# Patient Record
Sex: FEMALE | Race: WHITE
[De-identification: names, ages, dates, MRNs, and addresses within clinical notes are randomized per-mention and may not be internally consistent; named-entity substitution may affect disease eponyms.]

---

## 2022-04-14 ENCOUNTER — HOSPITAL ENCOUNTER (OUTPATIENT)
Dept: HOSPITAL 100 - CT | Age: 62
Discharge: HOME | End: 2022-04-14
Payer: SELF-PAY

## 2022-04-14 DIAGNOSIS — K21.9: Primary | ICD-10-CM

## 2022-04-14 DIAGNOSIS — R10.13: ICD-10-CM

## 2022-04-14 DIAGNOSIS — D64.9: ICD-10-CM

## 2022-04-14 PROCEDURE — 74176 CT ABD & PELVIS W/O CONTRAST: CPT

## 2022-04-19 ENCOUNTER — HOSPITAL ENCOUNTER (OUTPATIENT)
Dept: HOSPITAL 100 - CT | Age: 62
Discharge: HOME | End: 2022-04-19
Payer: SELF-PAY

## 2022-04-19 DIAGNOSIS — R91.1: Primary | ICD-10-CM

## 2022-04-19 DIAGNOSIS — Z98.82: ICD-10-CM

## 2022-04-19 PROCEDURE — 71250 CT THORAX DX C-: CPT

## 2023-03-09 DIAGNOSIS — I47.10 PAROXYSMAL SVT (SUPRAVENTRICULAR TACHYCARDIA) (CMS-HCC): ICD-10-CM

## 2023-03-09 DIAGNOSIS — T78.40XS ALLERGY, SEQUELA: Primary | ICD-10-CM

## 2023-03-09 RX ORDER — DILTIAZEM HYDROCHLORIDE 180 MG/1
1 CAPSULE, COATED, EXTENDED RELEASE ORAL DAILY
COMMUNITY
Start: 2020-02-25 | End: 2023-03-09 | Stop reason: SDUPTHER

## 2023-03-09 RX ORDER — ALBUTEROL SULFATE 0.83 MG/ML
2.5 SOLUTION RESPIRATORY (INHALATION) EVERY 4 HOURS PRN
COMMUNITY
Start: 2020-02-24 | End: 2024-04-01 | Stop reason: SDUPTHER

## 2023-03-09 RX ORDER — METOPROLOL TARTRATE 25 MG/1
25 TABLET, FILM COATED ORAL 2 TIMES DAILY
Qty: 180 TABLET | Refills: 3 | Status: SHIPPED | OUTPATIENT
Start: 2023-03-09 | End: 2023-05-22 | Stop reason: SDUPTHER

## 2023-03-09 RX ORDER — FEXOFENADINE HCL AND PSEUDOEPHEDRINE HCI 60; 120 MG/1; MG/1
1 TABLET, EXTENDED RELEASE ORAL EVERY 12 HOURS
Qty: 180 TABLET | Refills: 3 | Status: SHIPPED | OUTPATIENT
Start: 2023-03-09 | End: 2023-05-22 | Stop reason: SDUPTHER

## 2023-03-09 RX ORDER — FUROSEMIDE 40 MG/1
1 TABLET ORAL DAILY
COMMUNITY
Start: 2020-02-24 | End: 2023-05-11

## 2023-03-09 RX ORDER — METFORMIN HYDROCHLORIDE 750 MG/1
2 TABLET, EXTENDED RELEASE ORAL DAILY
COMMUNITY
Start: 2022-03-22 | End: 2023-03-22

## 2023-03-09 RX ORDER — FEXOFENADINE HCL AND PSEUDOEPHEDRINE HCI 60; 120 MG/1; MG/1
1 TABLET, EXTENDED RELEASE ORAL EVERY 12 HOURS
COMMUNITY
Start: 2020-02-24 | End: 2023-03-09 | Stop reason: SDUPTHER

## 2023-03-09 RX ORDER — METOPROLOL TARTRATE 25 MG/1
1 TABLET, FILM COATED ORAL 2 TIMES DAILY
COMMUNITY
Start: 2020-02-24 | End: 2023-03-09 | Stop reason: SDUPTHER

## 2023-03-09 RX ORDER — GLIMEPIRIDE 4 MG/1
1 TABLET ORAL 2 TIMES DAILY
COMMUNITY
Start: 2020-02-24 | End: 2023-05-11

## 2023-03-09 RX ORDER — POTASSIUM CHLORIDE 750 MG/1
1 TABLET, FILM COATED, EXTENDED RELEASE ORAL DAILY
COMMUNITY
Start: 2020-02-24 | End: 2023-04-26

## 2023-03-09 RX ORDER — OMEPRAZOLE 40 MG/1
1 CAPSULE, DELAYED RELEASE ORAL DAILY
COMMUNITY
Start: 2019-11-11 | End: 2023-05-22 | Stop reason: SDUPTHER

## 2023-03-09 RX ORDER — ALBUTEROL SULFATE 90 UG/1
2 AEROSOL, METERED RESPIRATORY (INHALATION) EVERY 4 HOURS PRN
COMMUNITY
Start: 2020-02-24 | End: 2023-04-26 | Stop reason: SDUPTHER

## 2023-03-09 RX ORDER — DILTIAZEM HYDROCHLORIDE 180 MG/1
180 CAPSULE, COATED, EXTENDED RELEASE ORAL DAILY
Qty: 90 CAPSULE | Refills: 3 | Status: SHIPPED | OUTPATIENT
Start: 2023-03-09 | End: 2023-05-22 | Stop reason: SDUPTHER

## 2023-03-15 DIAGNOSIS — E11.9 TYPE 2 DIABETES MELLITUS WITHOUT COMPLICATION, UNSPECIFIED WHETHER LONG TERM INSULIN USE (MULTI): ICD-10-CM

## 2023-03-22 RX ORDER — METFORMIN HYDROCHLORIDE 750 MG/1
1500 TABLET, EXTENDED RELEASE ORAL DAILY
Qty: 180 TABLET | Refills: 3 | Status: SHIPPED | OUTPATIENT
Start: 2023-03-22 | End: 2023-05-22 | Stop reason: SDUPTHER

## 2023-04-12 ENCOUNTER — TELEPHONE (OUTPATIENT)
Dept: PRIMARY CARE | Facility: CLINIC | Age: 63
End: 2023-04-12

## 2023-04-12 DIAGNOSIS — J43.2 CENTRILOBULAR EMPHYSEMA (MULTI): Primary | ICD-10-CM

## 2023-04-12 PROBLEM — E11.9 DIABETES MELLITUS (MULTI): Status: ACTIVE | Noted: 2023-04-12

## 2023-04-12 PROBLEM — J44.9 COPD (CHRONIC OBSTRUCTIVE PULMONARY DISEASE) (MULTI): Status: ACTIVE | Noted: 2023-04-12

## 2023-04-12 PROBLEM — R10.13 EPIGASTRIC ABDOMINAL PAIN OF UNKNOWN ETIOLOGY: Status: RESOLVED | Noted: 2023-04-12 | Resolved: 2023-04-12

## 2023-04-12 PROBLEM — I47.10 PAROXYSMAL SVT (SUPRAVENTRICULAR TACHYCARDIA) (CMS-HCC): Status: ACTIVE | Noted: 2023-04-12

## 2023-04-12 PROBLEM — K21.9 GERD (GASTROESOPHAGEAL REFLUX DISEASE): Status: ACTIVE | Noted: 2023-04-12

## 2023-04-12 RX ORDER — AZITHROMYCIN 250 MG/1
TABLET, FILM COATED ORAL
Qty: 6 TABLET | Refills: 0 | Status: SHIPPED | OUTPATIENT
Start: 2023-04-12 | End: 2023-04-17

## 2023-04-12 RX ORDER — METOPROLOL TARTRATE 50 MG/1
1 TABLET ORAL 2 TIMES DAILY
COMMUNITY
Start: 2023-03-09 | End: 2023-05-22 | Stop reason: SDUPTHER

## 2023-04-12 NOTE — TELEPHONE ENCOUNTER
Pt  left message stating that she has sinus infection x3wks OTC meds not working. Asking if you could send in ATB. Please advise

## 2023-04-14 DIAGNOSIS — E87.6 HYPOKALEMIA: Primary | ICD-10-CM

## 2023-04-14 DIAGNOSIS — J43.2 CENTRILOBULAR EMPHYSEMA (MULTI): ICD-10-CM

## 2023-04-26 RX ORDER — ALBUTEROL SULFATE 90 UG/1
2 AEROSOL, METERED RESPIRATORY (INHALATION) EVERY 4 HOURS PRN
Qty: 18 G | Refills: 3 | Status: SHIPPED | OUTPATIENT
Start: 2023-04-26 | End: 2023-09-07 | Stop reason: SDUPTHER

## 2023-04-26 RX ORDER — POTASSIUM CHLORIDE 750 MG/1
TABLET, EXTENDED RELEASE ORAL
Qty: 90 TABLET | Refills: 3 | Status: SHIPPED | OUTPATIENT
Start: 2023-04-26 | End: 2023-05-22 | Stop reason: SDUPTHER

## 2023-05-10 DIAGNOSIS — E11.9 TYPE 2 DIABETES MELLITUS WITHOUT COMPLICATION, UNSPECIFIED WHETHER LONG TERM INSULIN USE (MULTI): ICD-10-CM

## 2023-05-10 DIAGNOSIS — I47.10 PAROXYSMAL SVT (SUPRAVENTRICULAR TACHYCARDIA) (CMS-HCC): ICD-10-CM

## 2023-05-11 RX ORDER — GLIMEPIRIDE 4 MG/1
4 TABLET ORAL
Qty: 180 TABLET | Refills: 3 | Status: SHIPPED | OUTPATIENT
Start: 2023-05-11 | End: 2024-04-01 | Stop reason: SDUPTHER

## 2023-05-11 RX ORDER — FUROSEMIDE 40 MG/1
40 TABLET ORAL DAILY
Qty: 90 TABLET | Refills: 3 | Status: SHIPPED | OUTPATIENT
Start: 2023-05-11 | End: 2024-04-01 | Stop reason: SDUPTHER

## 2023-05-18 ENCOUNTER — TELEPHONE (OUTPATIENT)
Dept: PRIMARY CARE | Facility: CLINIC | Age: 63
End: 2023-05-18

## 2023-05-18 ENCOUNTER — LAB (OUTPATIENT)
Dept: LAB | Facility: LAB | Age: 63
End: 2023-05-18

## 2023-05-18 DIAGNOSIS — E11.9 TYPE 2 DIABETES MELLITUS WITHOUT COMPLICATION, WITHOUT LONG-TERM CURRENT USE OF INSULIN (MULTI): Primary | ICD-10-CM

## 2023-05-18 DIAGNOSIS — E11.9 TYPE 2 DIABETES MELLITUS WITHOUT COMPLICATION, WITHOUT LONG-TERM CURRENT USE OF INSULIN (MULTI): ICD-10-CM

## 2023-05-18 DIAGNOSIS — R53.82 CHRONIC FATIGUE: ICD-10-CM

## 2023-05-18 LAB
ALANINE AMINOTRANSFERASE (SGPT) (U/L) IN SER/PLAS: 14 U/L (ref 7–45)
ALBUMIN (G/DL) IN SER/PLAS: 4 G/DL (ref 3.4–5)
ALKALINE PHOSPHATASE (U/L) IN SER/PLAS: 129 U/L (ref 33–136)
ANION GAP IN SER/PLAS: 10 MMOL/L (ref 10–20)
ASPARTATE AMINOTRANSFERASE (SGOT) (U/L) IN SER/PLAS: 14 U/L (ref 9–39)
BILIRUBIN TOTAL (MG/DL) IN SER/PLAS: 0.5 MG/DL (ref 0–1.2)
CALCIUM (MG/DL) IN SER/PLAS: 10.3 MG/DL (ref 8.6–10.3)
CARBON DIOXIDE, TOTAL (MMOL/L) IN SER/PLAS: 29 MMOL/L (ref 21–32)
CHLORIDE (MMOL/L) IN SER/PLAS: 104 MMOL/L (ref 98–107)
CHOLESTEROL (MG/DL) IN SER/PLAS: 214 MG/DL (ref 0–199)
CHOLESTEROL IN HDL (MG/DL) IN SER/PLAS: 32 MG/DL
CHOLESTEROL/HDL RATIO: 6.7
COBALAMIN (VITAMIN B12) (PG/ML) IN SER/PLAS: 180 PG/ML (ref 211–911)
CREATININE (MG/DL) IN SER/PLAS: 0.8 MG/DL (ref 0.5–1.05)
ERYTHROCYTE DISTRIBUTION WIDTH (RATIO) BY AUTOMATED COUNT: 15.9 % (ref 11.5–14.5)
ERYTHROCYTE MEAN CORPUSCULAR HEMOGLOBIN CONCENTRATION (G/DL) BY AUTOMATED: 30 G/DL (ref 32–36)
ERYTHROCYTE MEAN CORPUSCULAR VOLUME (FL) BY AUTOMATED COUNT: 77 FL (ref 80–100)
ERYTHROCYTES (10*6/UL) IN BLOOD BY AUTOMATED COUNT: 5.76 X10E12/L (ref 4–5.2)
ESTIMATED AVERAGE GLUCOSE FOR HBA1C: 140 MG/DL
FERRITIN (UG/LL) IN SER/PLAS: 32 UG/L (ref 8–150)
GFR FEMALE: 82 ML/MIN/1.73M2
GLUCOSE (MG/DL) IN SER/PLAS: 75 MG/DL (ref 74–99)
HEMATOCRIT (%) IN BLOOD BY AUTOMATED COUNT: 44.4 % (ref 36–46)
HEMOGLOBIN (G/DL) IN BLOOD: 13.3 G/DL (ref 12–16)
HEMOGLOBIN A1C/HEMOGLOBIN TOTAL IN BLOOD: 6.5 %
IRON (UG/DL) IN SER/PLAS: 50 UG/DL (ref 35–150)
IRON BINDING CAPACITY (UG/DL) IN SER/PLAS: 483 UG/DL (ref 240–445)
IRON SATURATION (%) IN SER/PLAS: 10 % (ref 25–45)
LDL: 139 MG/DL (ref 0–99)
LEUKOCYTES (10*3/UL) IN BLOOD BY AUTOMATED COUNT: 7.1 X10E9/L (ref 4.4–11.3)
NON HDL CHOLESTEROL: 182 MG/DL
PLATELETS (10*3/UL) IN BLOOD AUTOMATED COUNT: 229 X10E9/L (ref 150–450)
POTASSIUM (MMOL/L) IN SER/PLAS: 4.4 MMOL/L (ref 3.5–5.3)
PROTEIN TOTAL: 7.1 G/DL (ref 6.4–8.2)
SODIUM (MMOL/L) IN SER/PLAS: 139 MMOL/L (ref 136–145)
THYROTROPIN (MIU/L) IN SER/PLAS BY DETECTION LIMIT <= 0.05 MIU/L: 4.05 MIU/L (ref 0.44–3.98)
TRIGLYCERIDE (MG/DL) IN SER/PLAS: 213 MG/DL (ref 0–149)
UREA NITROGEN (MG/DL) IN SER/PLAS: 15 MG/DL (ref 6–23)
VLDL: 43 MG/DL (ref 0–40)

## 2023-05-18 PROCEDURE — 80061 LIPID PANEL: CPT

## 2023-05-18 PROCEDURE — 80053 COMPREHEN METABOLIC PANEL: CPT

## 2023-05-18 PROCEDURE — 83036 HEMOGLOBIN GLYCOSYLATED A1C: CPT

## 2023-05-18 PROCEDURE — 85027 COMPLETE CBC AUTOMATED: CPT

## 2023-05-18 PROCEDURE — 36415 COLL VENOUS BLD VENIPUNCTURE: CPT

## 2023-05-18 PROCEDURE — 83550 IRON BINDING TEST: CPT

## 2023-05-18 PROCEDURE — 84443 ASSAY THYROID STIM HORMONE: CPT

## 2023-05-18 PROCEDURE — 83540 ASSAY OF IRON: CPT

## 2023-05-18 PROCEDURE — 82607 VITAMIN B-12: CPT

## 2023-05-18 PROCEDURE — 82728 ASSAY OF FERRITIN: CPT

## 2023-05-22 ENCOUNTER — TELEPHONE (OUTPATIENT)
Dept: PRIMARY CARE | Facility: CLINIC | Age: 63
End: 2023-05-22

## 2023-05-22 ENCOUNTER — OFFICE VISIT (OUTPATIENT)
Dept: PRIMARY CARE | Facility: CLINIC | Age: 63
End: 2023-05-22

## 2023-05-22 VITALS
HEIGHT: 67 IN | BODY MASS INDEX: 30.67 KG/M2 | DIASTOLIC BLOOD PRESSURE: 62 MMHG | HEART RATE: 76 BPM | SYSTOLIC BLOOD PRESSURE: 124 MMHG | OXYGEN SATURATION: 95 % | WEIGHT: 195.4 LBS

## 2023-05-22 DIAGNOSIS — E11.9 TYPE 2 DIABETES MELLITUS WITHOUT COMPLICATION, UNSPECIFIED WHETHER LONG TERM INSULIN USE (MULTI): ICD-10-CM

## 2023-05-22 DIAGNOSIS — T78.40XS ALLERGY, SEQUELA: ICD-10-CM

## 2023-05-22 DIAGNOSIS — R16.0 HEPATOMEGALY: ICD-10-CM

## 2023-05-22 DIAGNOSIS — E87.6 HYPOKALEMIA: ICD-10-CM

## 2023-05-22 DIAGNOSIS — J43.2 CENTRILOBULAR EMPHYSEMA (MULTI): ICD-10-CM

## 2023-05-22 DIAGNOSIS — K21.9 GASTROESOPHAGEAL REFLUX DISEASE, UNSPECIFIED WHETHER ESOPHAGITIS PRESENT: ICD-10-CM

## 2023-05-22 DIAGNOSIS — E53.8 B12 DEFICIENCY: ICD-10-CM

## 2023-05-22 DIAGNOSIS — D64.9 ANEMIA, UNSPECIFIED TYPE: Primary | ICD-10-CM

## 2023-05-22 DIAGNOSIS — I47.10 PAROXYSMAL SVT (SUPRAVENTRICULAR TACHYCARDIA) (CMS-HCC): ICD-10-CM

## 2023-05-22 PROCEDURE — 96372 THER/PROPH/DIAG INJ SC/IM: CPT | Performed by: FAMILY MEDICINE

## 2023-05-22 PROCEDURE — 99213 OFFICE O/P EST LOW 20 MIN: CPT | Performed by: FAMILY MEDICINE

## 2023-05-22 PROCEDURE — 3074F SYST BP LT 130 MM HG: CPT | Performed by: FAMILY MEDICINE

## 2023-05-22 PROCEDURE — 3044F HG A1C LEVEL LT 7.0%: CPT | Performed by: FAMILY MEDICINE

## 2023-05-22 PROCEDURE — 3078F DIAST BP <80 MM HG: CPT | Performed by: FAMILY MEDICINE

## 2023-05-22 RX ORDER — METOPROLOL TARTRATE 25 MG/1
25 TABLET, FILM COATED ORAL 2 TIMES DAILY
Qty: 180 TABLET | Refills: 3 | Status: SHIPPED | OUTPATIENT
Start: 2023-05-22 | End: 2024-04-01 | Stop reason: SDUPTHER

## 2023-05-22 RX ORDER — TRIAMCINOLONE ACETONIDE 40 MG/ML
40 INJECTION, SUSPENSION INTRA-ARTICULAR; INTRAMUSCULAR ONCE
Status: COMPLETED | OUTPATIENT
Start: 2023-05-22 | End: 2023-05-22

## 2023-05-22 RX ORDER — POTASSIUM CHLORIDE 750 MG/1
10 TABLET, FILM COATED, EXTENDED RELEASE ORAL DAILY
Qty: 90 TABLET | Refills: 3 | Status: SHIPPED | OUTPATIENT
Start: 2023-05-22 | End: 2024-04-01 | Stop reason: SDUPTHER

## 2023-05-22 RX ORDER — CYANOCOBALAMIN 1000 UG/ML
1000 INJECTION, SOLUTION INTRAMUSCULAR; SUBCUTANEOUS ONCE
Status: COMPLETED | OUTPATIENT
Start: 2023-05-22 | End: 2023-05-22

## 2023-05-22 RX ORDER — METOPROLOL TARTRATE 50 MG/1
50 TABLET ORAL 2 TIMES DAILY
Qty: 180 TABLET | Refills: 3 | Status: SHIPPED | OUTPATIENT
Start: 2023-05-22 | End: 2023-12-15 | Stop reason: SDUPTHER

## 2023-05-22 RX ORDER — METFORMIN HYDROCHLORIDE 750 MG/1
1500 TABLET, EXTENDED RELEASE ORAL DAILY
Qty: 180 TABLET | Refills: 3 | Status: SHIPPED | OUTPATIENT
Start: 2023-05-22 | End: 2024-03-07 | Stop reason: SDUPTHER

## 2023-05-22 RX ORDER — FEXOFENADINE HCL AND PSEUDOEPHEDRINE HCI 60; 120 MG/1; MG/1
1 TABLET, EXTENDED RELEASE ORAL EVERY 12 HOURS
Qty: 180 TABLET | Refills: 3 | Status: SHIPPED | OUTPATIENT
Start: 2023-05-22 | End: 2024-06-04

## 2023-05-22 RX ORDER — DILTIAZEM HYDROCHLORIDE 180 MG/1
180 CAPSULE, COATED, EXTENDED RELEASE ORAL DAILY
Qty: 90 CAPSULE | Refills: 3 | Status: SHIPPED | OUTPATIENT
Start: 2023-05-22 | End: 2024-04-01 | Stop reason: SDUPTHER

## 2023-05-22 RX ORDER — OMEPRAZOLE 40 MG/1
40 CAPSULE, DELAYED RELEASE ORAL DAILY
Qty: 90 CAPSULE | Refills: 3 | Status: SHIPPED | OUTPATIENT
Start: 2023-05-22 | End: 2024-04-01 | Stop reason: SDUPTHER

## 2023-05-22 RX ADMIN — TRIAMCINOLONE ACETONIDE 40 MG: 40 INJECTION, SUSPENSION INTRA-ARTICULAR; INTRAMUSCULAR at 10:55

## 2023-05-22 RX ADMIN — CYANOCOBALAMIN 1000 MCG: 1000 INJECTION, SOLUTION INTRAMUSCULAR; SUBCUTANEOUS at 11:21

## 2023-05-22 ASSESSMENT — ENCOUNTER SYMPTOMS
DIFFICULTY URINATING: 0
WHEEZING: 0
DIARRHEA: 0
VOMITING: 0
ARTHRALGIAS: 0
PALPITATIONS: 0
HEADACHES: 0
COUGH: 1
APPETITE CHANGE: 0
ABDOMINAL DISTENTION: 0
SHORTNESS OF BREATH: 1
LIGHT-HEADEDNESS: 0
ADENOPATHY: 0
CONSTIPATION: 0
ACTIVITY CHANGE: 0
NUMBNESS: 0
FATIGUE: 1
RHINORRHEA: 0
DIZZINESS: 0
UNEXPECTED WEIGHT CHANGE: 1
TROUBLE SWALLOWING: 0
ABDOMINAL PAIN: 1
NAUSEA: 1

## 2023-05-22 NOTE — ASSESSMENT & PLAN NOTE
Patient advised at length today about smoking cessation, give a Kenalog injection for COPD flare, continue with nebulizer treatments at home.

## 2023-05-22 NOTE — ASSESSMENT & PLAN NOTE
Hemoglobin now improved, patient unable to tolerate oral iron.  Strongly recommend EGD and colonoscopy, patient again defers due to his lack of insurance.

## 2023-05-22 NOTE — ASSESSMENT & PLAN NOTE
Patient with some increasing abdominal pain, patient is lost over 10 pounds in the past year, liver enzyme testing is normal however patient seems to have some enlargement of her liver, CAT scan last year question some ascites.  We will check ultrasound to evaluate further, recommend EGD and colonoscopy, patient defers due to cost of procedures with lack of insurance.

## 2023-05-22 NOTE — TELEPHONE ENCOUNTER
US OF LIVER ORDER WITH DEMOGRAPHICS FAXED TO [facility] AT FAX: [phone] AND PH: [phone] WITH REQUEST TO SCHEDULE.  PATIENT IS A SEFLPAY.

## 2023-05-22 NOTE — PROGRESS NOTES
"Subjective   Patient ID: Ana Melvin is a 63 y.o. female who presents for allergies, REV LABS.    HPI   No headache, chest pain, shortness of breath (+CORONA) dizziness (at times) lightheadedness, + edema  No low blood sugars since last OV, seen opthalmology in the past year, and no numbness or tingling in feet, skin normal.  Taking PPI daily without breakthrough symptoms.  Reviewed dietary, caffeine, tobacco, alcohol, and NSAID use.  No dyspepsia, dysphagia, reflux, melena, or abdominal pain.  + SOB, using albuterol frequently, still smoking despite , some sputum, some wheezing at times  Occ palpitations, some pain and nausea in stomach when eats, burping often  Had trouble tolerating oral iron    Review of Systems   Constitutional:  Positive for fatigue and unexpected weight change. Negative for activity change and appetite change.   HENT:  Negative for ear pain, nosebleeds, rhinorrhea, sneezing and trouble swallowing.    Respiratory:  Positive for cough and shortness of breath. Negative for wheezing.    Cardiovascular:  Negative for chest pain, palpitations and leg swelling.   Gastrointestinal:  Positive for abdominal pain and nausea. Negative for abdominal distention, constipation, diarrhea and vomiting.   Genitourinary:  Negative for difficulty urinating.   Musculoskeletal:  Negative for arthralgias.   Skin:  Negative for rash.   Neurological:  Negative for dizziness, light-headedness, numbness and headaches.   Hematological:  Negative for adenopathy.   Psychiatric/Behavioral:  Negative for behavioral problems.    All other systems reviewed and are negative.      Objective   /62   Pulse 76   Ht 1.689 m (5' 6.5\")   Wt 88.6 kg (195 lb 6.4 oz)   SpO2 95%   BMI 31.07 kg/m²     Physical Exam  Vitals and nursing note reviewed.   Constitutional:       General: She is not in acute distress.     Appearance: Normal appearance. She is not toxic-appearing.   HENT:      Head: Normocephalic and atraumatic.    "   Right Ear: Tympanic membrane, ear canal and external ear normal.      Left Ear: Tympanic membrane, ear canal and external ear normal.      Nose: Nose normal.      Mouth/Throat:      Mouth: Mucous membranes are moist.      Pharynx: Oropharynx is clear.   Eyes:      Extraocular Movements: Extraocular movements intact.      Conjunctiva/sclera: Conjunctivae normal.      Pupils: Pupils are equal, round, and reactive to light.   Cardiovascular:      Rate and Rhythm: Normal rate and regular rhythm.   Pulmonary:      Effort: Pulmonary effort is normal.      Breath sounds: Normal breath sounds.   Abdominal:      General: Abdomen is flat. Bowel sounds are normal. There is no distension.      Palpations: Abdomen is soft. There is mass.      Tenderness: There is abdominal tenderness.      Comments: Liver noted to be at least 2 fingerbreadths below the costal edge, patient also has evidence of multiple cherry angiomas over most of her abdomen.   Musculoskeletal:      Cervical back: Normal range of motion and neck supple.   Skin:     General: Skin is warm and dry.      Capillary Refill: Capillary refill takes less than 2 seconds.   Neurological:      General: No focal deficit present.      Mental Status: She is alert and oriented to person, place, and time. Mental status is at baseline.   Psychiatric:         Mood and Affect: Mood normal.         Behavior: Behavior normal.         Assessment/Plan   Problem List Items Addressed This Visit          Respiratory    COPD (chronic obstructive pulmonary disease) (CMS/[facility])     Patient advised at length today about smoking cessation, give a Kenalog injection for COPD flare, continue with nebulizer treatments at home.         Relevant Medications    triamcinolone acetonide (Kenalog-40) injection 40 mg (Completed)    Other Relevant Orders    Follow Up In Primary Care       Circulatory    Paroxysmal SVT (supraventricular tachycardia) (CMS/[facility])     Patient not aware of palpitations, blood  pressure under good control renal function stable.         Relevant Medications    dilTIAZem CD (Cartia XT) 180 mg 24 hr capsule    metoprolol tartrate (Lopressor) 25 mg tablet    metoprolol tartrate (Lopressor) 50 mg tablet    Other Relevant Orders    Follow Up In Primary Care    Comprehensive Metabolic Panel       Digestive    GERD (gastroesophageal reflux disease)     Patient with some increasing abdominal pain, patient is lost over 10 pounds in the past year, liver enzyme testing is normal however patient seems to have some enlargement of her liver, CAT scan last year question some ascites.  We will check ultrasound to evaluate further, recommend EGD and colonoscopy, patient defers due to cost of procedures with lack of insurance.         Relevant Medications    omeprazole (PriLOSEC) 40 mg DR capsule    Other Relevant Orders    Follow Up In Primary Care       Endocrine/Metabolic    Diabetes mellitus (CMS/HCC)     A1c testing now below 7, continue with current medication.         Relevant Medications    metFORMIN XR (Glucophage-XR) 750 mg 24 hr tablet    Other Relevant Orders    Follow Up In Primary Care    Comprehensive Metabolic Panel    Hemoglobin A1C    B12 deficiency     We will start B12 replacement.         Relevant Medications    cyanocobalamin (Vitamin B-12) injection 1,000 mcg (Completed)       Hematologic    Anemia - Primary     Hemoglobin now improved, patient unable to tolerate oral iron.  Strongly recommend EGD and colonoscopy, patient again defers due to his lack of insurance.         Relevant Orders    Follow Up In Primary Care    CBC     Other Visit Diagnoses       Allergy, sequela        Relevant Medications    fexofenadine-pseudoephedrine (Allegra-D)  mg 12 hr tablet    Other Relevant Orders    Follow Up In Primary Care    Hypokalemia        Relevant Medications    potassium chloride CR 10 mEq ER tablet    Other Relevant Orders    Follow Up In Primary Care    Hepatomegaly        Relevant  Orders    US abdomen limited liver

## 2023-05-22 NOTE — PATIENT INSTRUCTIONS
You need to consider to have an EGD to evaluate your stomach.  Try to quit smoking and try Selsum Blue shampoo for the scalp

## 2023-05-25 PROBLEM — U07.1 COVID-19: Status: RESOLVED | Noted: 2023-05-25 | Resolved: 2023-05-25

## 2023-05-25 PROBLEM — E66.9 EXTREME OBESITY: Status: ACTIVE | Noted: 2023-05-25

## 2023-05-25 PROBLEM — R91.1 LUNG NODULE: Status: ACTIVE | Noted: 2023-05-25

## 2023-05-25 PROBLEM — R09.1 PLEURISY: Status: RESOLVED | Noted: 2023-05-25 | Resolved: 2023-05-25

## 2023-05-25 PROBLEM — M54.50 LOW BACK PAIN: Status: ACTIVE | Noted: 2023-05-25

## 2023-05-25 PROBLEM — T78.40XA ALLERGIES: Status: ACTIVE | Noted: 2023-05-25

## 2023-05-25 PROBLEM — E66.8 EXTREME OBESITY: Status: ACTIVE | Noted: 2023-05-25

## 2023-05-25 PROBLEM — R35.1 NOCTURIA: Status: ACTIVE | Noted: 2023-05-25

## 2023-05-25 PROBLEM — B37.31 VAGINITIS DUE TO CANDIDA ALBICANS: Status: RESOLVED | Noted: 2023-05-25 | Resolved: 2023-05-25

## 2023-05-25 PROBLEM — R05.9 COUGH: Status: RESOLVED | Noted: 2023-05-25 | Resolved: 2023-05-25

## 2023-05-25 PROBLEM — R31.9 HEMATURIA: Status: ACTIVE | Noted: 2023-05-25

## 2023-05-25 PROBLEM — R35.0 URINARY FREQUENCY: Status: ACTIVE | Noted: 2023-05-25

## 2023-05-30 ENCOUNTER — CLINICAL SUPPORT (OUTPATIENT)
Dept: PRIMARY CARE | Facility: CLINIC | Age: 63
End: 2023-05-30

## 2023-05-30 ENCOUNTER — HOSPITAL ENCOUNTER (OUTPATIENT)
Dept: HOSPITAL 100 - US | Age: 63
Discharge: HOME | End: 2023-05-30
Payer: SELF-PAY

## 2023-05-30 DIAGNOSIS — E53.8 B12 DEFICIENCY: ICD-10-CM

## 2023-05-30 DIAGNOSIS — R16.0: ICD-10-CM

## 2023-05-30 DIAGNOSIS — R10.11: Primary | ICD-10-CM

## 2023-05-30 PROCEDURE — 76705 ECHO EXAM OF ABDOMEN: CPT

## 2023-05-30 PROCEDURE — 96372 THER/PROPH/DIAG INJ SC/IM: CPT | Performed by: FAMILY MEDICINE

## 2023-05-30 RX ORDER — CYANOCOBALAMIN 1000 UG/ML
1000 INJECTION, SOLUTION INTRAMUSCULAR; SUBCUTANEOUS ONCE
Status: COMPLETED | OUTPATIENT
Start: 2023-05-30 | End: 2023-05-30

## 2023-05-30 RX ADMIN — CYANOCOBALAMIN 1000 MCG: 1000 INJECTION, SOLUTION INTRAMUSCULAR; SUBCUTANEOUS at 16:09

## 2023-06-01 ENCOUNTER — DOCUMENTATION (OUTPATIENT)
Dept: PRIMARY CARE | Facility: CLINIC | Age: 63
End: 2023-06-01

## 2023-06-01 NOTE — PROGRESS NOTES
Patient contacted about results of ultrasound done at [facility].  Ultrasound demonstrated evidence of liver cirrhosis mild ascites and right pleural effusion.  Patient was recommended to see hepatology, patient would like to research this further before she proceeds, will contact the office and let us know how she would like to proceed.

## 2023-06-06 ENCOUNTER — CLINICAL SUPPORT (OUTPATIENT)
Dept: PRIMARY CARE | Facility: CLINIC | Age: 63
End: 2023-06-06

## 2023-06-06 DIAGNOSIS — E53.8 B12 DEFICIENCY: ICD-10-CM

## 2023-06-06 PROCEDURE — 96372 THER/PROPH/DIAG INJ SC/IM: CPT | Performed by: FAMILY MEDICINE

## 2023-06-06 RX ORDER — CYANOCOBALAMIN 1000 UG/ML
1000 INJECTION, SOLUTION INTRAMUSCULAR; SUBCUTANEOUS ONCE
Status: COMPLETED | OUTPATIENT
Start: 2023-06-06 | End: 2023-06-06

## 2023-06-06 RX ADMIN — CYANOCOBALAMIN 1000 MCG: 1000 INJECTION, SOLUTION INTRAMUSCULAR; SUBCUTANEOUS at 14:32

## 2023-06-13 ENCOUNTER — CLINICAL SUPPORT (OUTPATIENT)
Dept: PRIMARY CARE | Facility: CLINIC | Age: 63
End: 2023-06-13

## 2023-06-13 DIAGNOSIS — E53.8 B12 DEFICIENCY: ICD-10-CM

## 2023-06-13 PROCEDURE — 96372 THER/PROPH/DIAG INJ SC/IM: CPT | Performed by: FAMILY MEDICINE

## 2023-06-13 RX ORDER — CYANOCOBALAMIN 1000 UG/ML
1000 INJECTION, SOLUTION INTRAMUSCULAR; SUBCUTANEOUS ONCE
Status: COMPLETED | OUTPATIENT
Start: 2023-06-13 | End: 2023-06-13

## 2023-06-13 RX ADMIN — CYANOCOBALAMIN 1000 MCG: 1000 INJECTION, SOLUTION INTRAMUSCULAR; SUBCUTANEOUS at 14:37

## 2023-07-11 ENCOUNTER — CLINICAL SUPPORT (OUTPATIENT)
Dept: PRIMARY CARE | Facility: CLINIC | Age: 63
End: 2023-07-11
Payer: COMMERCIAL

## 2023-07-11 DIAGNOSIS — E53.8 B12 DEFICIENCY: ICD-10-CM

## 2023-07-11 PROCEDURE — 96372 THER/PROPH/DIAG INJ SC/IM: CPT | Performed by: FAMILY MEDICINE

## 2023-07-11 RX ORDER — CYANOCOBALAMIN 1000 UG/ML
1000 INJECTION, SOLUTION INTRAMUSCULAR; SUBCUTANEOUS ONCE
Status: COMPLETED | OUTPATIENT
Start: 2023-07-11 | End: 2023-07-11

## 2023-07-11 RX ADMIN — CYANOCOBALAMIN 1000 MCG: 1000 INJECTION, SOLUTION INTRAMUSCULAR; SUBCUTANEOUS at 14:30

## 2023-08-11 ENCOUNTER — CLINICAL SUPPORT (OUTPATIENT)
Dept: PRIMARY CARE | Facility: CLINIC | Age: 63
End: 2023-08-11

## 2023-08-11 DIAGNOSIS — E53.8 B12 DEFICIENCY: ICD-10-CM

## 2023-08-11 PROCEDURE — 96372 THER/PROPH/DIAG INJ SC/IM: CPT | Performed by: FAMILY MEDICINE

## 2023-08-11 RX ORDER — CYANOCOBALAMIN 1000 UG/ML
1000 INJECTION, SOLUTION INTRAMUSCULAR; SUBCUTANEOUS ONCE
Status: COMPLETED | OUTPATIENT
Start: 2023-08-11 | End: 2023-08-11

## 2023-08-11 RX ADMIN — CYANOCOBALAMIN 1000 MCG: 1000 INJECTION, SOLUTION INTRAMUSCULAR; SUBCUTANEOUS at 14:18

## 2023-08-23 ENCOUNTER — CLINICAL SUPPORT (OUTPATIENT)
Dept: PRIMARY CARE | Facility: CLINIC | Age: 63
End: 2023-08-23

## 2023-08-23 DIAGNOSIS — T78.40XA ALLERGY, INITIAL ENCOUNTER: ICD-10-CM

## 2023-08-23 PROCEDURE — 96372 THER/PROPH/DIAG INJ SC/IM: CPT | Performed by: FAMILY MEDICINE

## 2023-08-23 RX ORDER — TRIAMCINOLONE ACETONIDE 40 MG/ML
40 INJECTION, SUSPENSION INTRA-ARTICULAR; INTRAMUSCULAR ONCE
Status: COMPLETED | OUTPATIENT
Start: 2023-08-23 | End: 2023-08-23

## 2023-08-23 RX ADMIN — TRIAMCINOLONE ACETONIDE 40 MG: 40 INJECTION, SUSPENSION INTRA-ARTICULAR; INTRAMUSCULAR at 09:36

## 2023-08-23 NOTE — PROGRESS NOTES
PT CAME IN TODAY FOR INJECTION. 1 ML OF KENALOG WAS INJECTED INTRAMUSCULARLY IN THE  RT  DELTOID , NO ISSUES OR CONCERNS REPORTED.     LOT: [id number]  EXP: 03/2025

## 2023-09-07 DIAGNOSIS — J43.2 CENTRILOBULAR EMPHYSEMA (MULTI): ICD-10-CM

## 2023-09-07 RX ORDER — ALBUTEROL SULFATE 90 UG/1
2 AEROSOL, METERED RESPIRATORY (INHALATION) EVERY 4 HOURS PRN
Qty: 36 G | Refills: 3 | Status: SHIPPED | OUTPATIENT
Start: 2023-09-07 | End: 2024-04-01 | Stop reason: SDUPTHER

## 2023-09-15 ENCOUNTER — CLINICAL SUPPORT (OUTPATIENT)
Dept: PRIMARY CARE | Facility: CLINIC | Age: 63
End: 2023-09-15

## 2023-09-15 DIAGNOSIS — E53.8 B12 DEFICIENCY: ICD-10-CM

## 2023-09-15 PROCEDURE — 96372 THER/PROPH/DIAG INJ SC/IM: CPT | Performed by: FAMILY MEDICINE

## 2023-09-15 RX ORDER — CYANOCOBALAMIN 1000 UG/ML
1000 INJECTION, SOLUTION INTRAMUSCULAR; SUBCUTANEOUS ONCE
Status: COMPLETED | OUTPATIENT
Start: 2023-09-15 | End: 2023-09-15

## 2023-09-15 RX ADMIN — CYANOCOBALAMIN 1000 MCG: 1000 INJECTION, SOLUTION INTRAMUSCULAR; SUBCUTANEOUS at 14:07

## 2023-10-16 ENCOUNTER — CLINICAL SUPPORT (OUTPATIENT)
Dept: PRIMARY CARE | Facility: CLINIC | Age: 63
End: 2023-10-16

## 2023-10-16 DIAGNOSIS — E53.8 B12 DEFICIENCY: ICD-10-CM

## 2023-10-16 PROCEDURE — 96372 THER/PROPH/DIAG INJ SC/IM: CPT | Performed by: FAMILY MEDICINE

## 2023-10-16 RX ORDER — CYANOCOBALAMIN 1000 UG/ML
1000 INJECTION, SOLUTION INTRAMUSCULAR; SUBCUTANEOUS ONCE
Status: COMPLETED | OUTPATIENT
Start: 2023-10-16 | End: 2023-10-16

## 2023-10-16 RX ADMIN — CYANOCOBALAMIN 1000 MCG: 1000 INJECTION, SOLUTION INTRAMUSCULAR; SUBCUTANEOUS at 15:34

## 2023-11-10 ENCOUNTER — CLINICAL SUPPORT (OUTPATIENT)
Dept: PRIMARY CARE | Facility: CLINIC | Age: 63
End: 2023-11-10

## 2023-11-10 DIAGNOSIS — T78.40XD ALLERGY, SUBSEQUENT ENCOUNTER: ICD-10-CM

## 2023-11-10 PROCEDURE — 96372 THER/PROPH/DIAG INJ SC/IM: CPT | Performed by: FAMILY MEDICINE

## 2023-11-10 RX ORDER — TRIAMCINOLONE ACETONIDE 40 MG/ML
40 INJECTION, SUSPENSION INTRA-ARTICULAR; INTRAMUSCULAR ONCE
Status: COMPLETED | OUTPATIENT
Start: 2023-11-10 | End: 2023-11-10

## 2023-11-10 RX ADMIN — TRIAMCINOLONE ACETONIDE 40 MG: 40 INJECTION, SUSPENSION INTRA-ARTICULAR; INTRAMUSCULAR at 14:15

## 2023-11-10 NOTE — PROGRESS NOTES
PT CAME IN TODAY FOR INJECTION. 1 ML OF KENALOG WAS INJECTED INTRAMUSCULARLY IN THE  RT  DELTOID , NO ISSUES OR CONCERNS REPORTED.      LOT: [id number]  EXP: 03/2025

## 2023-11-27 ENCOUNTER — APPOINTMENT (OUTPATIENT)
Dept: PRIMARY CARE | Facility: CLINIC | Age: 63
End: 2023-11-27

## 2023-12-01 ENCOUNTER — TELEPHONE (OUTPATIENT)
Dept: PRIMARY CARE | Facility: CLINIC | Age: 63
End: 2023-12-01

## 2023-12-01 DIAGNOSIS — R05.1 ACUTE COUGH: Primary | ICD-10-CM

## 2023-12-01 RX ORDER — AZITHROMYCIN 250 MG/1
TABLET, FILM COATED ORAL
Qty: 6 TABLET | Refills: 0 | Status: SHIPPED | OUTPATIENT
Start: 2023-12-01 | End: 2023-12-05

## 2023-12-01 NOTE — TELEPHONE ENCOUNTER
Pt asking for something for a sinus infection. Pt states she has been taking OTC meds and its not helping. RT AIDE IS PHARMACY. Please advise

## 2023-12-15 DIAGNOSIS — I47.10 PAROXYSMAL SVT (SUPRAVENTRICULAR TACHYCARDIA) (CMS-HCC): ICD-10-CM

## 2023-12-15 RX ORDER — METOPROLOL TARTRATE 50 MG/1
50 TABLET ORAL 2 TIMES DAILY
Qty: 180 TABLET | Refills: 3 | Status: SHIPPED | OUTPATIENT
Start: 2023-12-15 | End: 2024-12-14

## 2024-03-07 ENCOUNTER — TELEPHONE (OUTPATIENT)
Dept: PRIMARY CARE | Facility: CLINIC | Age: 64
End: 2024-03-07
Payer: MEDICARE

## 2024-03-07 DIAGNOSIS — E11.9 TYPE 2 DIABETES MELLITUS WITHOUT COMPLICATION, UNSPECIFIED WHETHER LONG TERM INSULIN USE (MULTI): ICD-10-CM

## 2024-03-07 DIAGNOSIS — D64.9 ANEMIA, UNSPECIFIED TYPE: ICD-10-CM

## 2024-03-07 DIAGNOSIS — E11.9 TYPE 2 DIABETES MELLITUS WITHOUT COMPLICATION, WITHOUT LONG-TERM CURRENT USE OF INSULIN (MULTI): Primary | ICD-10-CM

## 2024-03-07 DIAGNOSIS — E53.8 B12 DEFICIENCY: ICD-10-CM

## 2024-03-07 RX ORDER — METFORMIN HYDROCHLORIDE 750 MG/1
1500 TABLET, EXTENDED RELEASE ORAL DAILY
Qty: 180 TABLET | Refills: 3 | Status: SHIPPED | OUTPATIENT
Start: 2024-03-07 | End: 2024-04-01 | Stop reason: SDUPTHER

## 2024-03-08 ENCOUNTER — CLINICAL SUPPORT (OUTPATIENT)
Dept: PRIMARY CARE | Facility: CLINIC | Age: 64
End: 2024-03-08
Payer: MEDICARE

## 2024-03-08 ENCOUNTER — LAB (OUTPATIENT)
Dept: LAB | Facility: LAB | Age: 64
End: 2024-03-08
Payer: MEDICARE

## 2024-03-08 DIAGNOSIS — D64.9 ANEMIA, UNSPECIFIED TYPE: ICD-10-CM

## 2024-03-08 DIAGNOSIS — E11.9 TYPE 2 DIABETES MELLITUS WITHOUT COMPLICATION, WITHOUT LONG-TERM CURRENT USE OF INSULIN (MULTI): ICD-10-CM

## 2024-03-08 DIAGNOSIS — I47.10 PAROXYSMAL SVT (SUPRAVENTRICULAR TACHYCARDIA) (CMS-HCC): ICD-10-CM

## 2024-03-08 DIAGNOSIS — E11.9 TYPE 2 DIABETES MELLITUS WITHOUT COMPLICATION, UNSPECIFIED WHETHER LONG TERM INSULIN USE (MULTI): ICD-10-CM

## 2024-03-08 DIAGNOSIS — T78.40XD ALLERGY, SUBSEQUENT ENCOUNTER: ICD-10-CM

## 2024-03-08 DIAGNOSIS — E53.8 B12 DEFICIENCY: ICD-10-CM

## 2024-03-08 LAB
ALBUMIN SERPL BCP-MCNC: 3.9 G/DL (ref 3.4–5)
ALP SERPL-CCNC: 111 U/L (ref 33–136)
ALT SERPL W P-5'-P-CCNC: 19 U/L (ref 7–45)
ANION GAP SERPL CALC-SCNC: 14 MMOL/L (ref 10–20)
AST SERPL W P-5'-P-CCNC: 24 U/L (ref 9–39)
BASOPHILS # BLD AUTO: 0.06 X10*3/UL (ref 0–0.1)
BASOPHILS NFR BLD AUTO: 0.7 %
BILIRUB SERPL-MCNC: 0.5 MG/DL (ref 0–1.2)
BUN SERPL-MCNC: 13 MG/DL (ref 6–23)
CALCIUM SERPL-MCNC: 10 MG/DL (ref 8.6–10.3)
CHLORIDE SERPL-SCNC: 100 MMOL/L (ref 98–107)
CHOLEST SERPL-MCNC: 177 MG/DL (ref 0–199)
CHOLESTEROL/HDL RATIO: 6.3
CO2 SERPL-SCNC: 25 MMOL/L (ref 21–32)
CREAT SERPL-MCNC: 0.9 MG/DL (ref 0.5–1.05)
EGFRCR SERPLBLD CKD-EPI 2021: 71 ML/MIN/1.73M*2
EOSINOPHIL # BLD AUTO: 0.35 X10*3/UL (ref 0–0.7)
EOSINOPHIL NFR BLD AUTO: 3.9 %
ERYTHROCYTE [DISTWIDTH] IN BLOOD BY AUTOMATED COUNT: 16.7 % (ref 11.5–14.5)
EST. AVERAGE GLUCOSE BLD GHB EST-MCNC: 194 MG/DL
FERRITIN SERPL-MCNC: 18 NG/ML (ref 8–150)
GLUCOSE SERPL-MCNC: 188 MG/DL (ref 74–99)
HBA1C MFR BLD: 8.4 %
HCT VFR BLD AUTO: 35.5 % (ref 36–46)
HDLC SERPL-MCNC: 28 MG/DL
HGB BLD-MCNC: 9.9 G/DL (ref 12–16)
IMM GRANULOCYTES # BLD AUTO: 0.05 X10*3/UL (ref 0–0.7)
IMM GRANULOCYTES NFR BLD AUTO: 0.6 % (ref 0–0.9)
LDLC SERPL CALC-MCNC: 106 MG/DL
LYMPHOCYTES # BLD AUTO: 2.1 X10*3/UL (ref 1.2–4.8)
LYMPHOCYTES NFR BLD AUTO: 23.4 %
MCH RBC QN AUTO: 19.6 PG (ref 26–34)
MCHC RBC AUTO-ENTMCNC: 27.9 G/DL (ref 32–36)
MCV RBC AUTO: 70 FL (ref 80–100)
MONOCYTES # BLD AUTO: 0.65 X10*3/UL (ref 0.1–1)
MONOCYTES NFR BLD AUTO: 7.3 %
NEUTROPHILS # BLD AUTO: 5.75 X10*3/UL (ref 1.2–7.7)
NEUTROPHILS NFR BLD AUTO: 64.1 %
NON HDL CHOLESTEROL: 149 MG/DL (ref 0–149)
NRBC BLD-RTO: 0 /100 WBCS (ref 0–0)
PLATELET # BLD AUTO: 347 X10*3/UL (ref 150–450)
POTASSIUM SERPL-SCNC: 4.1 MMOL/L (ref 3.5–5.3)
PROT SERPL-MCNC: 7 G/DL (ref 6.4–8.2)
RBC # BLD AUTO: 5.06 X10*6/UL (ref 4–5.2)
SODIUM SERPL-SCNC: 135 MMOL/L (ref 136–145)
TRIGL SERPL-MCNC: 214 MG/DL (ref 0–149)
TSH SERPL-ACNC: 3.33 MIU/L (ref 0.44–3.98)
VIT B12 SERPL-MCNC: 345 PG/ML (ref 211–911)
VLDL: 43 MG/DL (ref 0–40)
WBC # BLD AUTO: 9 X10*3/UL (ref 4.4–11.3)

## 2024-03-08 PROCEDURE — 82728 ASSAY OF FERRITIN: CPT

## 2024-03-08 PROCEDURE — 82607 VITAMIN B-12: CPT

## 2024-03-08 PROCEDURE — 85025 COMPLETE CBC W/AUTO DIFF WBC: CPT

## 2024-03-08 PROCEDURE — 83036 HEMOGLOBIN GLYCOSYLATED A1C: CPT

## 2024-03-08 PROCEDURE — 80061 LIPID PANEL: CPT

## 2024-03-08 PROCEDURE — 36415 COLL VENOUS BLD VENIPUNCTURE: CPT

## 2024-03-08 PROCEDURE — 84443 ASSAY THYROID STIM HORMONE: CPT

## 2024-03-08 PROCEDURE — 96372 THER/PROPH/DIAG INJ SC/IM: CPT | Performed by: FAMILY MEDICINE

## 2024-03-08 PROCEDURE — 80053 COMPREHEN METABOLIC PANEL: CPT

## 2024-03-08 RX ORDER — TRIAMCINOLONE ACETONIDE 40 MG/ML
40 INJECTION, SUSPENSION INTRA-ARTICULAR; INTRAMUSCULAR ONCE
Status: COMPLETED | OUTPATIENT
Start: 2024-03-08 | End: 2024-03-08

## 2024-03-08 RX ADMIN — TRIAMCINOLONE ACETONIDE 40 MG: 40 INJECTION, SUSPENSION INTRA-ARTICULAR; INTRAMUSCULAR at 11:27

## 2024-03-08 NOTE — PROGRESS NOTES
PT CAME IN TODAY FOR INJECTION. 1 ML OF KENALOG WAS INJECTED INTRAMUSCULARLY IN THE  RT  DELTOID , NO ISSUES OR CONCERNS REPORTED.     LOT: [id number]  EXP: 07/2025

## 2024-03-14 ENCOUNTER — TELEPHONE (OUTPATIENT)
Dept: PRIMARY CARE | Facility: CLINIC | Age: 64
End: 2024-03-14
Payer: MEDICARE

## 2024-03-14 DIAGNOSIS — K04.7 DENTAL INFECTION: Primary | ICD-10-CM

## 2024-03-14 RX ORDER — CLINDAMYCIN HYDROCHLORIDE 150 MG/1
150 CAPSULE ORAL 3 TIMES DAILY
Qty: 30 CAPSULE | Refills: 0 | Status: SHIPPED | OUTPATIENT
Start: 2024-03-14 | End: 2024-03-24

## 2024-03-14 NOTE — TELEPHONE ENCOUNTER
Penicillins are usually the recommended drug of choice for oral or dental infections.  She is allergic to penicillin, therefore I will try putting her on clindamycin and see if this helps.

## 2024-03-14 NOTE — TELEPHONE ENCOUNTER
Pt called in stating she has an infected tooth and sees dentist 2mro and may get tooth removed but would feel better if you prescribed the ATB

## 2024-03-26 ENCOUNTER — TELEPHONE (OUTPATIENT)
Dept: PRIMARY CARE | Facility: CLINIC | Age: 64
End: 2024-03-26
Payer: MEDICARE

## 2024-03-26 DIAGNOSIS — B37.31 YEAST VAGINITIS: Primary | ICD-10-CM

## 2024-03-26 RX ORDER — FLUCONAZOLE 150 MG/1
150 TABLET ORAL ONCE
Qty: 1 TABLET | Refills: 0 | Status: SHIPPED | OUTPATIENT
Start: 2024-03-26 | End: 2024-04-01 | Stop reason: ALTCHOICE

## 2024-03-26 NOTE — TELEPHONE ENCOUNTER
Pt stattes she was taking ATB for 10 days and now has yeast infection could you please send in something to [facility]

## 2024-04-01 ENCOUNTER — OFFICE VISIT (OUTPATIENT)
Dept: PRIMARY CARE | Facility: CLINIC | Age: 64
End: 2024-04-01
Payer: MEDICARE

## 2024-04-01 VITALS
BODY MASS INDEX: 32.68 KG/M2 | SYSTOLIC BLOOD PRESSURE: 130 MMHG | HEART RATE: 79 BPM | OXYGEN SATURATION: 98 % | HEIGHT: 67 IN | DIASTOLIC BLOOD PRESSURE: 70 MMHG | WEIGHT: 208.2 LBS

## 2024-04-01 DIAGNOSIS — E53.8 B12 DEFICIENCY: ICD-10-CM

## 2024-04-01 DIAGNOSIS — I47.10 PAROXYSMAL SVT (SUPRAVENTRICULAR TACHYCARDIA) (CMS-HCC): ICD-10-CM

## 2024-04-01 DIAGNOSIS — Z00.00 ROUTINE GENERAL MEDICAL EXAMINATION AT HEALTH CARE FACILITY: Primary | ICD-10-CM

## 2024-04-01 DIAGNOSIS — E87.6 HYPOKALEMIA: ICD-10-CM

## 2024-04-01 DIAGNOSIS — Z12.11 COLON CANCER SCREENING: ICD-10-CM

## 2024-04-01 DIAGNOSIS — J43.2 CENTRILOBULAR EMPHYSEMA (MULTI): ICD-10-CM

## 2024-04-01 DIAGNOSIS — D64.9 ANEMIA, UNSPECIFIED TYPE: ICD-10-CM

## 2024-04-01 DIAGNOSIS — E11.9 TYPE 2 DIABETES MELLITUS WITHOUT COMPLICATION, UNSPECIFIED WHETHER LONG TERM INSULIN USE (MULTI): ICD-10-CM

## 2024-04-01 DIAGNOSIS — Z87.891 HISTORY OF TOBACCO USE DISORDER: ICD-10-CM

## 2024-04-01 DIAGNOSIS — Z72.0 TOBACCO ABUSE: ICD-10-CM

## 2024-04-01 DIAGNOSIS — K21.9 GASTROESOPHAGEAL REFLUX DISEASE, UNSPECIFIED WHETHER ESOPHAGITIS PRESENT: ICD-10-CM

## 2024-04-01 PROBLEM — E66.8 EXTREME OBESITY: Status: RESOLVED | Noted: 2023-05-25 | Resolved: 2024-04-01

## 2024-04-01 PROBLEM — E66.9 EXTREME OBESITY: Status: RESOLVED | Noted: 2023-05-25 | Resolved: 2024-04-01

## 2024-04-01 PROCEDURE — G0403 EKG FOR INITIAL PREVENT EXAM: HCPCS | Performed by: FAMILY MEDICINE

## 2024-04-01 PROCEDURE — 1170F FXNL STATUS ASSESSED: CPT | Performed by: FAMILY MEDICINE

## 2024-04-01 PROCEDURE — 99214 OFFICE O/P EST MOD 30 MIN: CPT | Performed by: FAMILY MEDICINE

## 2024-04-01 PROCEDURE — 1160F RVW MEDS BY RX/DR IN RCRD: CPT | Performed by: FAMILY MEDICINE

## 2024-04-01 PROCEDURE — 3052F HG A1C>EQUAL 8.0%<EQUAL 9.0%: CPT | Performed by: FAMILY MEDICINE

## 2024-04-01 PROCEDURE — 3078F DIAST BP <80 MM HG: CPT | Performed by: FAMILY MEDICINE

## 2024-04-01 PROCEDURE — 3049F LDL-C 100-129 MG/DL: CPT | Performed by: FAMILY MEDICINE

## 2024-04-01 PROCEDURE — 3075F SYST BP GE 130 - 139MM HG: CPT | Performed by: FAMILY MEDICINE

## 2024-04-01 PROCEDURE — 1159F MED LIST DOCD IN RCRD: CPT | Performed by: FAMILY MEDICINE

## 2024-04-01 PROCEDURE — G0402 INITIAL PREVENTIVE EXAM: HCPCS | Performed by: FAMILY MEDICINE

## 2024-04-01 RX ORDER — FUROSEMIDE 40 MG/1
40 TABLET ORAL DAILY
Qty: 90 TABLET | Refills: 3 | Status: SHIPPED | OUTPATIENT
Start: 2024-04-01 | End: 2025-04-01

## 2024-04-01 RX ORDER — ALBUTEROL SULFATE 0.83 MG/ML
2.5 SOLUTION RESPIRATORY (INHALATION) 2 TIMES DAILY
Qty: 540 ML | Refills: 3 | Status: SHIPPED | OUTPATIENT
Start: 2024-04-01 | End: 2025-04-01

## 2024-04-01 RX ORDER — OMEPRAZOLE 40 MG/1
40 CAPSULE, DELAYED RELEASE ORAL DAILY
Qty: 90 CAPSULE | Refills: 3 | Status: SHIPPED | OUTPATIENT
Start: 2024-04-01 | End: 2025-04-01

## 2024-04-01 RX ORDER — DILTIAZEM HYDROCHLORIDE 180 MG/1
180 CAPSULE, COATED, EXTENDED RELEASE ORAL DAILY
Qty: 90 CAPSULE | Refills: 3 | Status: SHIPPED | OUTPATIENT
Start: 2024-04-01 | End: 2025-04-01

## 2024-04-01 RX ORDER — ALBUTEROL SULFATE 90 UG/1
2 AEROSOL, METERED RESPIRATORY (INHALATION) EVERY 4 HOURS PRN
Qty: 36 G | Refills: 3 | Status: SHIPPED | OUTPATIENT
Start: 2024-04-01 | End: 2025-04-01

## 2024-04-01 RX ORDER — METOPROLOL TARTRATE 25 MG/1
25 TABLET, FILM COATED ORAL 2 TIMES DAILY
Qty: 180 TABLET | Refills: 3 | Status: SHIPPED | OUTPATIENT
Start: 2024-04-01 | End: 2025-04-01

## 2024-04-01 RX ORDER — POTASSIUM CHLORIDE 750 MG/1
10 TABLET, FILM COATED, EXTENDED RELEASE ORAL DAILY
Qty: 90 TABLET | Refills: 3 | Status: SHIPPED | OUTPATIENT
Start: 2024-04-01 | End: 2025-04-01

## 2024-04-01 RX ORDER — GLIMEPIRIDE 4 MG/1
4 TABLET ORAL
Qty: 180 TABLET | Refills: 3 | Status: SHIPPED | OUTPATIENT
Start: 2024-04-01 | End: 2025-04-01

## 2024-04-01 RX ORDER — METFORMIN HYDROCHLORIDE 750 MG/1
1500 TABLET, EXTENDED RELEASE ORAL DAILY
Qty: 180 TABLET | Refills: 3 | Status: SHIPPED | OUTPATIENT
Start: 2024-04-01 | End: 2025-04-01

## 2024-04-01 ASSESSMENT — ENCOUNTER SYMPTOMS
DIARRHEA: 0
ABDOMINAL PAIN: 1
ARTHRALGIAS: 0
LIGHT-HEADEDNESS: 0
TROUBLE SWALLOWING: 0
NUMBNESS: 0
ADENOPATHY: 0
APPETITE CHANGE: 0
ACTIVITY CHANGE: 0
PALPITATIONS: 0
DIZZINESS: 0
DEPRESSION: 0
HEADACHES: 0
COUGH: 1
OCCASIONAL FEELINGS OF UNSTEADINESS: 0
DYSPHORIC MOOD: 0
CONSTIPATION: 0
VOMITING: 0
ABDOMINAL DISTENTION: 1
UNEXPECTED WEIGHT CHANGE: 0
DIFFICULTY URINATING: 0
SLEEP DISTURBANCE: 0
NAUSEA: 0
WHEEZING: 0
FATIGUE: 0
LOSS OF SENSATION IN FEET: 0
RHINORRHEA: 0
SHORTNESS OF BREATH: 0
NERVOUS/ANXIOUS: 0

## 2024-04-01 ASSESSMENT — ACTIVITIES OF DAILY LIVING (ADL)
TAKING_MEDICATION: INDEPENDENT
BATHING: INDEPENDENT
MANAGING_FINANCES: INDEPENDENT
GROCERY_SHOPPING: INDEPENDENT
DOING_HOUSEWORK: INDEPENDENT
DRESSING: INDEPENDENT

## 2024-04-01 ASSESSMENT — PATIENT HEALTH QUESTIONNAIRE - PHQ9
SUM OF ALL RESPONSES TO PHQ9 QUESTIONS 1 AND 2: 0
1. LITTLE INTEREST OR PLEASURE IN DOING THINGS: NOT AT ALL
2. FEELING DOWN, DEPRESSED OR HOPELESS: NOT AT ALL

## 2024-04-01 ASSESSMENT — VISUAL ACUITY
OD_CC: 20/15
OS_CC: 20/15

## 2024-04-01 NOTE — ASSESSMENT & PLAN NOTE
A1c testing now above 7, advised about diet and exercise, continue with glimepiride and metformin, may need to give consideration for additional medications.

## 2024-04-01 NOTE — PROGRESS NOTES
"Subjective   Reason for Visit: Ana Melvin is an 64 y.o. female here for a Medicare Wellness visit.     Past Medical, Surgical, and Family History reviewed and updated in chart.    Reviewed all medications by prescribing practitioner or clinical pharmacist (such as prescriptions, OTCs, herbal therapies and supplements) and documented in the medical record.    HPI  No headache, chest pain, shortness of breath (+CORONA/coughing and wheezing), dizziness, lightheadedness, or edema  No low blood sugars since last OV, seen opthalmology in the past year, and no numbness or tingling in feet, skin normal.  Defers Prevnar, seen dentist for dental abscess  Has had some ankle swelling in the past  1ppd for the past 40 years  Generalized abdominal discomfort, no nausea or GERD, taking PPI, loose stools at times, occ mihir colored stools    Patient Care Team:  Hasmukh Stewart MD as PCP - General (Family Medicine)     Review of Systems   Constitutional:  Negative for activity change, appetite change, fatigue and unexpected weight change.   HENT:  Negative for ear pain, nosebleeds, rhinorrhea, sneezing and trouble swallowing.    Respiratory:  Positive for cough. Negative for shortness of breath and wheezing.    Cardiovascular:  Negative for chest pain, palpitations and leg swelling.   Gastrointestinal:  Positive for abdominal distention and abdominal pain. Negative for constipation, diarrhea, nausea and vomiting.   Genitourinary:  Negative for difficulty urinating.   Musculoskeletal:  Negative for arthralgias.   Skin:  Negative for rash.   Neurological:  Negative for dizziness, light-headedness, numbness and headaches.   Hematological:  Negative for adenopathy.   Psychiatric/Behavioral:  Negative for behavioral problems, dysphoric mood and sleep disturbance. The patient is not nervous/anxious.    All other systems reviewed and are negative.      Objective   Vitals:  /70   Pulse 79   Ht 1.689 m (5' 6.5\")   Wt 94.4 kg (208 " lb 3.2 oz)   SpO2 98%   BMI 33.10 kg/m²       Physical Exam  Vitals and nursing note reviewed.   Constitutional:       General: She is not in acute distress.     Appearance: Normal appearance. She is not toxic-appearing.   HENT:      Head: Normocephalic and atraumatic.      Right Ear: Tympanic membrane, ear canal and external ear normal.      Left Ear: Tympanic membrane, ear canal and external ear normal.      Nose: Nose normal.      Mouth/Throat:      Mouth: Mucous membranes are moist.      Pharynx: Oropharynx is clear.   Eyes:      Extraocular Movements: Extraocular movements intact.      Conjunctiva/sclera: Conjunctivae normal.      Pupils: Pupils are equal, round, and reactive to light.   Cardiovascular:      Rate and Rhythm: Normal rate and regular rhythm.      Pulses: Normal pulses.      Heart sounds: Normal heart sounds.   Pulmonary:      Effort: Pulmonary effort is normal.      Breath sounds: Normal breath sounds.   Abdominal:      General: Abdomen is flat. Bowel sounds are normal.      Palpations: Abdomen is soft.      Comments: Mild pain right upper quadrant, no guarding.   Musculoskeletal:      Cervical back: Normal range of motion and neck supple.   Skin:     General: Skin is warm and dry.      Capillary Refill: Capillary refill takes less than 2 seconds.   Neurological:      General: No focal deficit present.      Mental Status: She is alert and oriented to person, place, and time. Mental status is at baseline.   Psychiatric:         Mood and Affect: Mood normal.         Behavior: Behavior normal.         Assessment/Plan   Problem List Items Addressed This Visit       COPD (chronic obstructive pulmonary disease) (CMS/[facility])    Current Assessment & Plan     Counseled strongly about smoking cessation, patient refuses pneumonia vaccination despite , try Anoro Ellipta and do CT screening for lung nodules.         Relevant Medications    albuterol 90 mcg/actuation inhaler    albuterol 2.5 mg /3 mL  (0.083 %) nebulizer solution    umeclidinium-vilanteroL (Anoro Ellipta) 62.5-25 mcg/actuation blister with device    Other Relevant Orders    CT lung screening low dose    Follow Up In Primary Care - Established    Diabetes mellitus (CMS/[facility])    Current Assessment & Plan     A1c testing now above 7, advised about diet and exercise, continue with glimepiride and metformin, may need to give consideration for additional medications.         Relevant Medications    glimepiride (Amaryl) 4 mg tablet    metFORMIN XR (Glucophage-XR) 750 mg 24 hr tablet    Other Relevant Orders    Cholesterol, LDL Direct    Comprehensive Metabolic Panel    Hemoglobin A1C    Follow Up In Primary Care - Established    GERD (gastroesophageal reflux disease)    Current Assessment & Plan     Continue with Prilosec for the time being, will refer to GI for colonoscopy, may also need EGD.         Relevant Medications    omeprazole (PriLOSEC) 40 mg DR capsule    Other Relevant Orders    Follow Up In Primary Care - Established    Paroxysmal SVT (supraventricular tachycardia) (CMS/[facility])    Current Assessment & Plan     Continue with metoprolol, patient self adjusted metoprolol herself, it was advised would generally recommend taking either 50 or 100 mg twice a day, continue with the way patient is currently taking.         Relevant Medications    dilTIAZem CD (Cartia XT) 180 mg 24 hr capsule    furosemide (Lasix) 40 mg tablet    metoprolol tartrate (Lopressor) 25 mg tablet    Other Relevant Orders    Follow Up In Primary Care - Established    B12 deficiency    Current Assessment & Plan     B12 levels have improved, continue with replacement.         Relevant Orders    Vitamin B12    Follow Up In Primary Care - Established    Anemia    Current Assessment & Plan     Advised about iron replacement, check colonoscopy, may also benefit from EGD, CT scan done within the past 2 years was stable.         Relevant Orders    CBC and Auto Differential    Ferritin     Reticulocytes    Iron and TIBC    Follow Up In Primary Care - Established     Other Visit Diagnoses       Routine general medical examination at health care facility    -  Primary    Relevant Orders    Follow Up In Primary Care - Established    ECG 12 Lead (Completed)    Hypokalemia        Relevant Medications    potassium chloride CR 10 mEq ER tablet    Other Relevant Orders    Follow Up In Primary Care - Established    Tobacco abuse        Relevant Orders    Follow Up In Primary Care - Established    Colon cancer screening        Relevant Orders    Colonoscopy Screening; Average Risk Patient    Follow Up In Primary Care - Established    History of tobacco use disorder        Relevant Orders    CT lung screening low dose

## 2024-04-01 NOTE — ASSESSMENT & PLAN NOTE
Counseled strongly about smoking cessation, patient refuses pneumonia vaccination despite , try Anoro Ellipta and do CT screening for lung nodules.

## 2024-04-01 NOTE — PATIENT INSTRUCTIONS
Work on quitting smoking and diet, consider pneumonia vaccination, we are going to do a CT of the chest, and have a colonoscopy done. Start Anoro if you can afford with insurance

## 2024-04-01 NOTE — ASSESSMENT & PLAN NOTE
Advised about iron replacement, check colonoscopy, may also benefit from EGD, CT scan done within the past 2 years was stable.

## 2024-04-01 NOTE — ASSESSMENT & PLAN NOTE
Continue with metoprolol, patient self adjusted metoprolol herself, it was advised would generally recommend taking either 50 or 100 mg twice a day, continue with the way patient is currently taking.

## 2024-04-17 ENCOUNTER — HOSPITAL ENCOUNTER (OUTPATIENT)
Age: 64
Discharge: HOME | End: 2024-04-17
Payer: MEDICARE

## 2024-04-17 DIAGNOSIS — J43.2: ICD-10-CM

## 2024-04-17 DIAGNOSIS — Z12.2: Primary | ICD-10-CM

## 2024-04-17 DIAGNOSIS — Z87.891: ICD-10-CM

## 2024-04-17 PROCEDURE — 71271 CT THORAX LUNG CANCER SCR C-: CPT

## 2024-05-03 ENCOUNTER — TELEPHONE (OUTPATIENT)
Dept: PRIMARY CARE | Facility: CLINIC | Age: 64
End: 2024-05-03
Payer: MEDICARE

## 2024-05-03 NOTE — TELEPHONE ENCOUNTER
Pt states she will take an extra lasix to help with swelling in legs and ankles. Pt will call Monday if not any better.     Pt asking if she can have referral for Dr Holder in [place] gastro office? He specializes in the liver. Please advise

## 2024-05-03 NOTE — TELEPHONE ENCOUNTER
I am okay with her using a increase in her diuretic over the weekend.  I am confused as to why she feels she needs to see a hepatologist.  Her last liver function testing was normal, I am concerned more about her anemia and needing to have a colonoscopy done.

## 2024-05-17 ENCOUNTER — TELEPHONE (OUTPATIENT)
Dept: PRIMARY CARE | Facility: CLINIC | Age: 64
End: 2024-05-17
Payer: MEDICARE

## 2024-05-17 NOTE — TELEPHONE ENCOUNTER
Called to see if patient wanted colonoscopy referral resent to Dr. Emmanuel's office, patient stated she got a referral to go somewhere else for the colonoscopy so she no longer needs the [place] referral

## 2024-05-24 ENCOUNTER — TELEPHONE (OUTPATIENT)
Dept: PRIMARY CARE | Facility: CLINIC | Age: 64
End: 2024-05-24
Payer: MEDICARE

## 2024-05-24 DIAGNOSIS — J44.1 CHRONIC OBSTRUCTIVE PULMONARY DISEASE WITH ACUTE EXACERBATION (MULTI): Primary | ICD-10-CM

## 2024-05-24 RX ORDER — PREDNISONE 10 MG/1
TABLET ORAL DAILY
Qty: 30 TABLET | Refills: 0 | Status: SHIPPED | OUTPATIENT
Start: 2024-05-24 | End: 2024-06-11 | Stop reason: ALTCHOICE

## 2024-05-24 NOTE — TELEPHONE ENCOUNTER
Prescription for 10 days taper of prednisone sent to local pharmacy.  Make sure that she is using her inhalers as directed, she can also supplement with nebulizer if needed.

## 2024-05-24 NOTE — TELEPHONE ENCOUNTER
Pt came into the office for Kenalog injection and was told that she had one in March and could not get one yet. Pt requesting oral prednisone. Please advise [facility] [address] [place]

## 2024-05-27 DIAGNOSIS — T78.40XS ALLERGY, SEQUELA: ICD-10-CM

## 2024-06-03 ENCOUNTER — LAB (OUTPATIENT)
Dept: LAB | Facility: LAB | Age: 64
End: 2024-06-03
Payer: MEDICARE

## 2024-06-03 ENCOUNTER — OFFICE VISIT (OUTPATIENT)
Dept: PRIMARY CARE | Facility: CLINIC | Age: 64
End: 2024-06-03
Payer: MEDICARE

## 2024-06-03 VITALS
SYSTOLIC BLOOD PRESSURE: 130 MMHG | OXYGEN SATURATION: 96 % | DIASTOLIC BLOOD PRESSURE: 64 MMHG | WEIGHT: 213.2 LBS | HEART RATE: 85 BPM | BODY MASS INDEX: 33.46 KG/M2 | HEIGHT: 67 IN

## 2024-06-03 DIAGNOSIS — D64.9 ANEMIA, UNSPECIFIED TYPE: Primary | ICD-10-CM

## 2024-06-03 DIAGNOSIS — E11.9 TYPE 2 DIABETES MELLITUS WITHOUT COMPLICATION, WITHOUT LONG-TERM CURRENT USE OF INSULIN (MULTI): ICD-10-CM

## 2024-06-03 DIAGNOSIS — D64.9 ANEMIA, UNSPECIFIED TYPE: ICD-10-CM

## 2024-06-03 DIAGNOSIS — E53.8 B12 DEFICIENCY: ICD-10-CM

## 2024-06-03 DIAGNOSIS — K21.9 GASTROESOPHAGEAL REFLUX DISEASE WITHOUT ESOPHAGITIS: ICD-10-CM

## 2024-06-03 DIAGNOSIS — B37.0 THRUSH: ICD-10-CM

## 2024-06-03 DIAGNOSIS — E11.9 TYPE 2 DIABETES MELLITUS WITHOUT COMPLICATION, UNSPECIFIED WHETHER LONG TERM INSULIN USE (MULTI): ICD-10-CM

## 2024-06-03 LAB
ALBUMIN SERPL BCP-MCNC: 3.8 G/DL (ref 3.4–5)
ALP SERPL-CCNC: 84 U/L (ref 33–136)
ALT SERPL W P-5'-P-CCNC: 23 U/L (ref 7–45)
ANION GAP SERPL CALC-SCNC: 9 MMOL/L (ref 10–20)
AST SERPL W P-5'-P-CCNC: 21 U/L (ref 9–39)
BASOPHILS # BLD AUTO: 0.03 X10*3/UL (ref 0–0.1)
BASOPHILS NFR BLD AUTO: 0.2 %
BILIRUB SERPL-MCNC: 0.5 MG/DL (ref 0–1.2)
BUN SERPL-MCNC: 17 MG/DL (ref 6–23)
CALCIUM SERPL-MCNC: 10.1 MG/DL (ref 8.6–10.3)
CHLORIDE SERPL-SCNC: 99 MMOL/L (ref 98–107)
CO2 SERPL-SCNC: 30 MMOL/L (ref 21–32)
CREAT SERPL-MCNC: 0.85 MG/DL (ref 0.5–1.05)
EGFRCR SERPLBLD CKD-EPI 2021: 76 ML/MIN/1.73M*2
EOSINOPHIL # BLD AUTO: 0.19 X10*3/UL (ref 0–0.7)
EOSINOPHIL NFR BLD AUTO: 1.5 %
ERYTHROCYTE [DISTWIDTH] IN BLOOD BY AUTOMATED COUNT: 19.1 % (ref 11.5–14.5)
EST. AVERAGE GLUCOSE BLD GHB EST-MCNC: 192 MG/DL
FERRITIN SERPL-MCNC: 14 NG/ML (ref 8–150)
GLUCOSE SERPL-MCNC: 176 MG/DL (ref 74–99)
HBA1C MFR BLD: 8.3 %
HCT VFR BLD AUTO: 34.6 % (ref 36–46)
HGB BLD-MCNC: 9.5 G/DL (ref 12–16)
HGB RETIC QN: 20 PG (ref 28–38)
IMM GRANULOCYTES # BLD AUTO: 0.1 X10*3/UL (ref 0–0.7)
IMM GRANULOCYTES NFR BLD AUTO: 0.8 % (ref 0–0.9)
IMMATURE RETIC FRACTION: 25.7 %
IRON SATN MFR SERPL: 4 % (ref 25–45)
IRON SERPL-MCNC: 16 UG/DL (ref 35–150)
LDLC SERPL DIRECT ASSAY-MCNC: 137 MG/DL (ref 0–129)
LYMPHOCYTES # BLD AUTO: 2.79 X10*3/UL (ref 1.2–4.8)
LYMPHOCYTES NFR BLD AUTO: 21.6 %
MCH RBC QN AUTO: 18.2 PG (ref 26–34)
MCHC RBC AUTO-ENTMCNC: 27.5 G/DL (ref 32–36)
MCV RBC AUTO: 66 FL (ref 80–100)
MONOCYTES # BLD AUTO: 0.81 X10*3/UL (ref 0.1–1)
MONOCYTES NFR BLD AUTO: 6.3 %
NEUTROPHILS # BLD AUTO: 8.97 X10*3/UL (ref 1.2–7.7)
NEUTROPHILS NFR BLD AUTO: 69.6 %
NRBC BLD-RTO: 0 /100 WBCS (ref 0–0)
PLATELET # BLD AUTO: 342 X10*3/UL (ref 150–450)
POTASSIUM SERPL-SCNC: 4.1 MMOL/L (ref 3.5–5.3)
PROT SERPL-MCNC: 6.7 G/DL (ref 6.4–8.2)
RBC # BLD AUTO: 5.22 X10*6/UL (ref 4–5.2)
RETICS #: 0.07 X10*6/UL (ref 0.02–0.08)
RETICS/RBC NFR AUTO: 1.4 % (ref 0.5–2)
SODIUM SERPL-SCNC: 134 MMOL/L (ref 136–145)
TIBC SERPL-MCNC: 444 UG/DL (ref 240–445)
UIBC SERPL-MCNC: 428 UG/DL (ref 110–370)
VIT B12 SERPL-MCNC: 226 PG/ML (ref 211–911)
WBC # BLD AUTO: 12.9 X10*3/UL (ref 4.4–11.3)

## 2024-06-03 PROCEDURE — 3078F DIAST BP <80 MM HG: CPT | Performed by: FAMILY MEDICINE

## 2024-06-03 PROCEDURE — 83540 ASSAY OF IRON: CPT

## 2024-06-03 PROCEDURE — 82728 ASSAY OF FERRITIN: CPT

## 2024-06-03 PROCEDURE — 85045 AUTOMATED RETICULOCYTE COUNT: CPT

## 2024-06-03 PROCEDURE — 83721 ASSAY OF BLOOD LIPOPROTEIN: CPT

## 2024-06-03 PROCEDURE — 83550 IRON BINDING TEST: CPT

## 2024-06-03 PROCEDURE — 80053 COMPREHEN METABOLIC PANEL: CPT

## 2024-06-03 PROCEDURE — 1160F RVW MEDS BY RX/DR IN RCRD: CPT | Performed by: FAMILY MEDICINE

## 2024-06-03 PROCEDURE — 99214 OFFICE O/P EST MOD 30 MIN: CPT | Performed by: FAMILY MEDICINE

## 2024-06-03 PROCEDURE — 3052F HG A1C>EQUAL 8.0%<EQUAL 9.0%: CPT | Performed by: FAMILY MEDICINE

## 2024-06-03 PROCEDURE — 3075F SYST BP GE 130 - 139MM HG: CPT | Performed by: FAMILY MEDICINE

## 2024-06-03 PROCEDURE — 1159F MED LIST DOCD IN RCRD: CPT | Performed by: FAMILY MEDICINE

## 2024-06-03 PROCEDURE — 82607 VITAMIN B-12: CPT

## 2024-06-03 PROCEDURE — 36415 COLL VENOUS BLD VENIPUNCTURE: CPT

## 2024-06-03 PROCEDURE — 85025 COMPLETE CBC W/AUTO DIFF WBC: CPT

## 2024-06-03 PROCEDURE — 83036 HEMOGLOBIN GLYCOSYLATED A1C: CPT

## 2024-06-03 PROCEDURE — 3049F LDL-C 100-129 MG/DL: CPT | Performed by: FAMILY MEDICINE

## 2024-06-03 RX ORDER — NYSTATIN 100000 [USP'U]/ML
5 SUSPENSION ORAL 4 TIMES DAILY
Qty: 280 ML | Refills: 0 | Status: SHIPPED | OUTPATIENT
Start: 2024-06-03 | End: 2024-06-17

## 2024-06-03 ASSESSMENT — ENCOUNTER SYMPTOMS
DIFFICULTY URINATING: 0
UNEXPECTED WEIGHT CHANGE: 0
TROUBLE SWALLOWING: 0
ADENOPATHY: 0
VOMITING: 0
SORE THROAT: 1
COUGH: 1
LIGHT-HEADEDNESS: 1
PALPITATIONS: 0
HEADACHES: 0
ABDOMINAL PAIN: 0
SHORTNESS OF BREATH: 1
DYSPHORIC MOOD: 0
NERVOUS/ANXIOUS: 0
ARTHRALGIAS: 0
WHEEZING: 0
NUMBNESS: 0
ABDOMINAL DISTENTION: 0
CONSTIPATION: 0
NAUSEA: 0
DIARRHEA: 0
SLEEP DISTURBANCE: 0
ACTIVITY CHANGE: 0
FATIGUE: 1
DIZZINESS: 1
APPETITE CHANGE: 0
RHINORRHEA: 0

## 2024-06-03 NOTE — PROGRESS NOTES
"Subjective   Patient ID: Ana Melvin is a 64 y.o. female who presents for Swelling in feet and ankles (Lt headed).    HPI   Swelling at feet and ankles, + fatigue  More SOB, LH/dizzy at times, legs ache  Not taking iron, had a hard time tolerating oral Fe  Did not get scope done   Raw feeling in throat and mouth    Review of Systems   Constitutional:  Positive for fatigue. Negative for activity change, appetite change and unexpected weight change.   HENT:  Positive for sore throat. Negative for ear pain, nosebleeds, rhinorrhea, sneezing and trouble swallowing.    Respiratory:  Positive for cough and shortness of breath. Negative for wheezing.    Cardiovascular:  Positive for leg swelling. Negative for chest pain and palpitations.   Gastrointestinal:  Negative for abdominal distention, abdominal pain, constipation, diarrhea, nausea and vomiting.   Genitourinary:  Negative for difficulty urinating.   Musculoskeletal:  Negative for arthralgias.   Skin:  Negative for rash.   Neurological:  Positive for dizziness and light-headedness. Negative for numbness and headaches.   Hematological:  Negative for adenopathy.   Psychiatric/Behavioral:  Negative for behavioral problems, dysphoric mood and sleep disturbance. The patient is not nervous/anxious.    All other systems reviewed and are negative.      Objective   /64   Pulse 85   Ht 1.689 m (5' 6.5\")   Wt 96.7 kg (213 lb 3.2 oz)   SpO2 96%   BMI 33.90 kg/m²     Physical Exam  Vitals and nursing note reviewed.   Constitutional:       Appearance: Normal appearance.   HENT:      Head: Normocephalic and atraumatic.      Right Ear: Tympanic membrane, ear canal and external ear normal.      Left Ear: Tympanic membrane, ear canal and external ear normal.      Nose: Nose normal.      Mouth/Throat:      Mouth: Mucous membranes are moist.      Pharynx: Posterior oropharyngeal erythema present.   Cardiovascular:      Rate and Rhythm: Normal rate and regular rhythm.      " Pulses: Normal pulses.      Heart sounds: Normal heart sounds.      Comments: +2 edema bilateral ankles.  Pulmonary:      Effort: Pulmonary effort is normal.      Breath sounds: Normal breath sounds.   Abdominal:      General: Abdomen is flat. Bowel sounds are normal.      Palpations: Abdomen is soft. There is no mass.      Tenderness: There is abdominal tenderness. There is no guarding.      Comments: Tenderness mid abdomen, no mass palpated.  No guarding   Musculoskeletal:      Cervical back: Normal range of motion and neck supple.   Skin:     General: Skin is warm and dry.   Neurological:      Mental Status: She is alert.   Psychiatric:         Mood and Affect: Mood normal.         Behavior: Behavior normal.         Assessment/Plan   Problem List Items Addressed This Visit             ICD-10-CM    Diabetes mellitus (Multi) E11.9     Advised about smoking cessation last A1c testing was elevated, continue with current medications but may need to adjust medicines.         GERD (gastroesophageal reflux disease) K21.9    Relevant Orders    Referral to Gastroenterology    Anemia - Primary D64.9     Concerned about persistent anemia and the patient's symptoms which include shortness of breath leg cramps lower extremity swelling and GI issues.  Desperately needs an EGD and colonoscopy, will try to get this arranged and [place] per patient's request.  Check labs today to see if she may benefit from an iron infusion or hematology consultation, is unable to tolerate oral iron.         Relevant Orders    Follow Up In Primary Care - Established    Referral to Gastroenterology     Other Visit Diagnoses         Codes    Thrush     B37.0    Relevant Medications    nystatin (Mycostatin) 100,000 unit/mL suspension    Other Relevant Orders    Follow Up In Primary Care - Established

## 2024-06-03 NOTE — PATIENT INSTRUCTIONS
You are going to need to have a scope of your colon and stomach, start Anoro for your breathing and use mouth rinse for irritation

## 2024-06-03 NOTE — ASSESSMENT & PLAN NOTE
Advised about smoking cessation last A1c testing was elevated, continue with current medications but may need to adjust medicines.

## 2024-06-03 NOTE — TELEPHONE ENCOUNTER
Please let the patient know that her blood testing shows that she still has some iron and her bone marrow, I am not sure as to the etiology of her anemia, would like a consultation with hematology.  Let me know if I need to talk to her.

## 2024-06-03 NOTE — ASSESSMENT & PLAN NOTE
Concerned about persistent anemia and the patient's symptoms which include shortness of breath leg cramps lower extremity swelling and GI issues.  Desperately needs an EGD and colonoscopy, will try to get this arranged and [place] per patient's request.  Check labs today to see if she may benefit from an iron infusion or hematology consultation, is unable to tolerate oral iron.

## 2024-06-04 DIAGNOSIS — D64.9 ANEMIA, UNSPECIFIED TYPE: Primary | ICD-10-CM

## 2024-06-04 RX ORDER — FEXOFENADINE HCL AND PSEUDOEPHEDRINE HCI 60; 120 MG/1; MG/1
1 TABLET, EXTENDED RELEASE ORAL EVERY 12 HOURS
Qty: 180 TABLET | Refills: 3 | Status: SHIPPED | OUTPATIENT
Start: 2024-06-04 | End: 2025-06-04

## 2024-06-04 NOTE — RESULT ENCOUNTER NOTE
I placed an order for hematology locally after discussing with patient.  Please make sure that the office in [place] that the patient had contacted (Dr. Holder) is aware that the patient has anemia and needs an EGD and colonoscopy.  Referral was placed yesterday for EGD, patient has been reluctant to follow through with getting testing done.

## 2024-06-04 NOTE — TELEPHONE ENCOUNTER
----- Message from Hasmukh Stewart MD sent at 6/4/2024  8:28 AM EDT -----  I placed an order for hematology locally after discussing with patient.  Please make sure that the office in [place] that the patient had contacted (Dr. Holder) is aware that the patient has anemia and needs an EGD and colonoscopy.  Referral was placed yesterday for EGD, patient has been reluctant to follow through with getting testing done.

## 2024-06-10 NOTE — PROGRESS NOTES
Patient ID: Ana Melvin is a 64 y.o. female.  Referring Physician: Hasmukh Stewart MD  [address] [place] [address]  Visit Type: Initial Visit    Diagnosis/Reason: Anemia     HPI:  Patient is a 63 yo female with a PMH of COPD, GERD, Paroxysmal SVT, B12  and was referred to benign hematology for consultation of anemia.    Chronic abdominal pain for the past 2 years. Is scheduled to see a GI in July, to schedule for colonoscopy/ EGD. Patient reports she has been dealing with poor dentition, recent had tooth extraction, possible tooth infection completed course of antibiotics.      Today, patient presents for initial consultation. Energy is poor, fatigued very easily. CORONA with any exertion. Bilateral edema in LLE, has been increased (feet, ankles, legs) Left arm to shoulder pain. Severe right hip. Eating less than she use to, consisently feels full, even with out eating. 10-20lbs weight loss over a year. Loose stool with lots of bile. Denies fever, chills. Bruises easily, denies any abnormal bleeding. No recurrent infections or lymphadenopathy. No bone pain. No known blood disorders in family. Has had surgery in past w/o issue.   Episodes of light headed/dizziness. Nails are dry, brittle, hair loss, texture changes.      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[employer],   PSH:  Breast Augmentation,   FH:  Half sister- Multiple Myeloma- (same mother)-  dx-2010  Father- Bladder Cancer  M-Grandmother- Stomach Cancer    Meds: see list     Menstrual Cycle History:  Post Menopausal, denies any vaginal bleeding   No MMG 10+ years and pap  No Skin Cancer Screening     Previous Hematological Background:  Hx of hematological disorders: No - Patient denies prior hematologic history  Hx of blood transfusions: No - Patient denies prior blood transfusion  Hx of iron supplementation: No - Denies any previous  supplementation  Hx of B12 supplementation: Yes - Prior oral supplementation - Denies adverse effect and reaction  Hx of folate supplementation: No - Denies any prior supplementation    Relevant medications:  Currently using NSAID's (Naproxen, Ibuprofen etc.):         PMHx:  Active Ambulatory Problems     Diagnosis Date Noted    COPD (chronic obstructive pulmonary disease) (Multi) 04/12/2023    Diabetes mellitus (Multi) 04/12/2023    GERD (gastroesophageal reflux disease) 04/12/2023    Paroxysmal SVT (supraventricular tachycardia) (CMS-[facility]) 04/12/2023    B12 deficiency 05/22/2023    Anemia 05/22/2023    Allergies 05/25/2023    Hematuria 05/25/2023    Low back pain 05/25/2023    Lung nodule 05/25/2023    Nocturia 05/25/2023    Urinary frequency 05/25/2023     Resolved Ambulatory Problems     Diagnosis Date Noted    Epigastric abdominal pain of unknown etiology 04/12/2023    Cough 05/25/2023    COVID-19 05/25/2023    Extreme obesity 05/25/2023    Pleurisy 05/25/2023    Vaginitis due to Candida albicans 05/25/2023     Past Medical History:   Diagnosis Date    Personal history of other medical treatment     Personal history of other medical treatment         PSHx:  Past Surgical History:   Procedure Laterality Date    OTHER SURGICAL HISTORY  02/24/2020    [place] tooth extraction    OTHER SURGICAL HISTORY  02/24/2020    Breast augmentation    OTHER SURGICAL HISTORY  02/24/2020    Cervical biopsy        FHx:  Family History   Problem Relation Name Age of Onset    Other (CVA) Mother      Coronary artery disease Mother      Heart attack Father      Other (BLADDER CANCER) Father      Multiple myeloma Sister      Heart attack Brother      Kidney failure Brother            Social Hx:  Ana Melvin    reports that she has been smoking cigarettes. She has never used smokeless tobacco.  She  reports that she does not currently use alcohol.  She Drug use questions deferred to the physician.  Social History     Socioeconomic  History    Marital status:      Spouse name: Not on file    Number of children: Not on file    Years of education: Not on file    Highest education level: Not on file   Occupational History    Not on file   Tobacco Use    Smoking status: Every Day     Types: Cigarettes    Smokeless tobacco: Never   Vaping Use    Vaping status: Never Used   Substance and Sexual Activity    Alcohol use: Not Currently    Drug use: Defer    Sexual activity: Defer   Other Topics Concern    Not on file   Social History Narrative    Not on file     Social Determinants of Health     Financial Resource Strain: Not on file   Food Insecurity: Not on file   Transportation Needs: Not on file   Physical Activity: Not on file   Stress: Not on file   Social Connections: Not on file   Intimate Partner Violence: Not on file   Housing Stability: Not on file         Medications and allergies reviewed in EMR.    ROS:  Review of Systems   Constitutional:  Positive for appetite change and fatigue.   HENT:  Negative.     Eyes: Negative.    Respiratory:  Positive for shortness of breath.    Cardiovascular: Negative.    Gastrointestinal: Negative.    Endocrine: Negative.    Genitourinary: Negative.     Musculoskeletal:  Positive for arthralgias and myalgias.   Neurological:  Positive for dizziness and light-headedness.   Hematological:  Bruises/bleeds easily.   Psychiatric/Behavioral: Negative.        10 point review of systems negative except as state in HPI.    Vitals & Statistics:  Objective   BSA: There is no height or weight on file to calculate BSA.  There were no vitals taken for this visit.    Physical Exam:  Physical Exam  Nursing note reviewed.   Constitutional:       Appearance: Normal appearance.   HENT:      Head: Normocephalic and atraumatic.      Mouth/Throat:      Pharynx: Oropharynx is clear.   Eyes:      Extraocular Movements: Extraocular movements intact.      Conjunctiva/sclera: Conjunctivae normal.   Cardiovascular:      Rate and  Rhythm: Normal rate and regular rhythm.      Pulses: Normal pulses.      Heart sounds: Normal heart sounds.   Pulmonary:      Effort: Pulmonary effort is normal.      Breath sounds: Normal breath sounds.   Abdominal:      General: Bowel sounds are normal. There is no distension.      Palpations: Abdomen is soft. There is no mass.      Tenderness: There is no abdominal tenderness.   Musculoskeletal:         General: Swelling present. Normal range of motion.      Cervical back: Normal range of motion.   Skin:     General: Skin is warm and dry.      Findings: Bruising present.   Neurological:      General: No focal deficit present.      Mental Status: She is alert and oriented to person, place, and time.   Psychiatric:         Mood and Affect: Mood normal.         Behavior: Behavior normal.         Thought Content: Thought content normal.         Judgment: Judgment normal.           Results:  Lab Results   Component Value Date    WBC 12.9 (H) 06/03/2024    NEUTROABS 8.97 (H) 06/03/2024    IGABSOL 0.10 06/03/2024    LYMPHSABS 2.79 06/03/2024    MONOSABS 0.81 06/03/2024    EOSABS 0.19 06/03/2024    BASOSABS 0.03 06/03/2024    RBC 5.22 (H) 06/03/2024    MCV 66 (L) 06/03/2024    MCHC 27.5 (L) 06/03/2024    HGB 9.5 (L) 06/03/2024    HCT 34.6 (L) 06/03/2024     06/03/2024     Lab Results   Component Value Date    RETICCTPCT 1.4 06/03/2024      Lab Results   Component Value Date    CREATININE 0.85 06/03/2024    BUN 17 06/03/2024    EGFR 76 06/03/2024     (L) 06/03/2024    K 4.1 06/03/2024    CL 99 06/03/2024    CO2 30 06/03/2024      Lab Results   Component Value Date    ALT 23 06/03/2024    AST 21 06/03/2024    ALKPHOS 84 06/03/2024    BILITOT 0.5 06/03/2024      Lab Results   Component Value Date    TSH 3.33 03/08/2024     Lab Results   Component Value Date    TSH 3.33 03/08/2024     Lab Results   Component Value Date    IRON 16 (L) 06/03/2024    TIBC 444 06/03/2024    FERRITIN 14 06/03/2024      Lab Results  "  Component Value Date    KSUZWITJ38 226 06/03/2024      Lab Results   Component Value Date    FOLATE 11.4 03/28/2022     No results found for: \"HUANG\", \"RF\", \"SEDRATE\"   No results found for: \"CRP\"   No results found for: \"TOYIN\"  No results found for: \"LDH\"  No results found for: \"HAPTOGLOBIN\"  No results found for: \"SPEP\"  No results found for: \"IGG\", \"IGM\", \"IGA\"  No results found for: \"HEPATOT\", \"HEPAIGM\", \"HEPBCIGM\", \"HEPBCAB\", \"HEPBSAG\", \"HEPCAB\"  No results found for: \"HIV1X2\"    Assessment:    Anemia  According to our records patient has been anemic since 2022 with a Hg ranging from 9.9-13.3, remainder of major bloodlines have been stable. Iron parameters were reported low in 6/3/24: Fe 16, Fe sat 4%, Ferritin 14, B12 226. Patient has been dealing with dental infections, was treated previously with antibiotics, not currently taking any antibiotics. Denies any abnormal bleeding, bruises easily. Multiple arthralgias/myalgias. Poor energy. Shortness of breath with activity/exertion. Eating less than normal, early satiety occasional. Weight loss of 10-20lbs over a year time frame. Denies any B symptoms.      Discussed oral vs IV replacement of Iron. Recommended IV iron as patient has GI issues currently and is significantly iron deficient with a Fe sat of 4%. Will plan for course of IV iron to administered. Patient is B12 deficient will order administration of IM B12 weekly x 4. Will continue to monitor patients labs closely.      Discussed possible etiologies including multifactorial, nutritional deficiencies, anemia of chronic disease, malabsorption, hemopathy, medications, bleeding, malignancy, etc. Will start hematological workup today.     2. Atypical Mole:   Patient has a mole on her chest in which patient verbalized concerns due recent changes in appearance. Patient denies have a skin cancer screening performed in her life time. Discussed referral to dermatology for skin cancer screening and evaluation of " atypical mole on her chest.     I had an extensive discussion with the patient regarding the diagnosis and discussed the plan of therapy, including general considerations regarding side effects and outcomes. Pt understood and gave appropriate teach back about the plan of care. All questions were answered to the patient's satisfaction. The patient is instructed to contact us at any time if questions or problems arise. Thank you for the opportunity to participate in the care of this very pleasant patient.    Total time = 60 minutes. 50% or more of this time was spent in counseling and/or coordination of care including reviewing medical history/radiology/labs, examining patient, formulating outlined plan with team, and discussing plan with patient/family.    Michelle Israel, APRN-CNP

## 2024-06-11 ENCOUNTER — INFUSION (OUTPATIENT)
Dept: HEMATOLOGY/ONCOLOGY | Facility: CLINIC | Age: 64
End: 2024-06-11
Payer: MEDICARE

## 2024-06-11 ENCOUNTER — OFFICE VISIT (OUTPATIENT)
Dept: HEMATOLOGY/ONCOLOGY | Facility: CLINIC | Age: 64
End: 2024-06-11
Payer: MEDICARE

## 2024-06-11 VITALS
HEART RATE: 86 BPM | TEMPERATURE: 97.4 F | WEIGHT: 216.4 LBS | RESPIRATION RATE: 18 BRPM | HEIGHT: 65 IN | DIASTOLIC BLOOD PRESSURE: 77 MMHG | OXYGEN SATURATION: 96 % | SYSTOLIC BLOOD PRESSURE: 124 MMHG | BODY MASS INDEX: 36.06 KG/M2

## 2024-06-11 DIAGNOSIS — D64.9 ANEMIA, UNSPECIFIED TYPE: ICD-10-CM

## 2024-06-11 DIAGNOSIS — E53.8 B12 DEFICIENCY: ICD-10-CM

## 2024-06-11 DIAGNOSIS — E61.1 IRON DEFICIENCY: ICD-10-CM

## 2024-06-11 DIAGNOSIS — E53.8 B12 DEFICIENCY: Primary | ICD-10-CM

## 2024-06-11 LAB
25(OH)D3 SERPL-MCNC: 62 NG/ML (ref 30–100)
CRP SERPL-MCNC: 4.48 MG/DL
ERYTHROCYTE [SEDIMENTATION RATE] IN BLOOD BY WESTERGREN METHOD: 49 MM/H (ref 0–30)
FOLATE SERPL-MCNC: 13.1 NG/ML
HGB RETIC QN: 21 PG (ref 28–38)
IMMATURE RETIC FRACTION: 24.6 %
LDH SERPL L TO P-CCNC: 130 U/L (ref 84–246)
RETICS #: 0.08 X10*6/UL (ref 0.02–0.08)
RETICS/RBC NFR AUTO: 1.7 % (ref 0.5–2)
RHEUMATOID FACT SER NEPH-ACNC: 12 IU/ML (ref 0–15)
URATE SERPL-MCNC: 6.1 MG/DL (ref 2.3–6.7)

## 2024-06-11 PROCEDURE — 84550 ASSAY OF BLOOD/URIC ACID: CPT

## 2024-06-11 PROCEDURE — 99215 OFFICE O/P EST HI 40 MIN: CPT | Mod: 25

## 2024-06-11 PROCEDURE — 82746 ASSAY OF FOLIC ACID SERUM: CPT

## 2024-06-11 PROCEDURE — 83921 ORGANIC ACID SINGLE QUANT: CPT

## 2024-06-11 PROCEDURE — 2500000004 HC RX 250 GENERAL PHARMACY W/ HCPCS (ALT 636 FOR OP/ED)

## 2024-06-11 PROCEDURE — 3050F LDL-C >= 130 MG/DL: CPT

## 2024-06-11 PROCEDURE — 82668 ASSAY OF ERYTHROPOIETIN: CPT

## 2024-06-11 PROCEDURE — 86140 C-REACTIVE PROTEIN: CPT

## 2024-06-11 PROCEDURE — 1159F MED LIST DOCD IN RCRD: CPT

## 2024-06-11 PROCEDURE — 3074F SYST BP LT 130 MM HG: CPT

## 2024-06-11 PROCEDURE — 85652 RBC SED RATE AUTOMATED: CPT

## 2024-06-11 PROCEDURE — 36415 COLL VENOUS BLD VENIPUNCTURE: CPT

## 2024-06-11 PROCEDURE — 3052F HG A1C>EQUAL 8.0%<EQUAL 9.0%: CPT

## 2024-06-11 PROCEDURE — 86431 RHEUMATOID FACTOR QUANT: CPT | Mod: SAMLAB

## 2024-06-11 PROCEDURE — 85045 AUTOMATED RETICULOCYTE COUNT: CPT

## 2024-06-11 PROCEDURE — 82306 VITAMIN D 25 HYDROXY: CPT

## 2024-06-11 PROCEDURE — 86038 ANTINUCLEAR ANTIBODIES: CPT | Mod: SAMLAB

## 2024-06-11 PROCEDURE — 83010 ASSAY OF HAPTOGLOBIN QUANT: CPT

## 2024-06-11 PROCEDURE — 1126F AMNT PAIN NOTED NONE PRSNT: CPT

## 2024-06-11 PROCEDURE — 82180 ASSAY OF ASCORBIC ACID: CPT

## 2024-06-11 PROCEDURE — 96372 THER/PROPH/DIAG INJ SC/IM: CPT

## 2024-06-11 PROCEDURE — 83615 LACTATE (LD) (LDH) ENZYME: CPT

## 2024-06-11 PROCEDURE — 3078F DIAST BP <80 MM HG: CPT

## 2024-06-11 PROCEDURE — 99205 OFFICE O/P NEW HI 60 MIN: CPT

## 2024-06-11 RX ORDER — HEPARIN SODIUM,PORCINE/PF 10 UNIT/ML
50 SYRINGE (ML) INTRAVENOUS AS NEEDED
OUTPATIENT
Start: 2024-06-11

## 2024-06-11 RX ORDER — CYANOCOBALAMIN 1000 UG/ML
1000 INJECTION, SOLUTION INTRAMUSCULAR; SUBCUTANEOUS ONCE
Status: COMPLETED | OUTPATIENT
Start: 2024-06-11 | End: 2024-06-11

## 2024-06-11 RX ORDER — ALBUTEROL SULFATE 0.83 MG/ML
3 SOLUTION RESPIRATORY (INHALATION) AS NEEDED
OUTPATIENT
Start: 2024-06-18

## 2024-06-11 RX ORDER — FAMOTIDINE 10 MG/ML
20 INJECTION INTRAVENOUS ONCE AS NEEDED
OUTPATIENT
Start: 2024-06-11

## 2024-06-11 RX ORDER — CYANOCOBALAMIN 1000 UG/ML
INJECTION, SOLUTION INTRAMUSCULAR; SUBCUTANEOUS
Status: COMPLETED
Start: 2024-06-11 | End: 2024-06-11

## 2024-06-11 RX ORDER — FAMOTIDINE 10 MG/ML
20 INJECTION INTRAVENOUS ONCE AS NEEDED
OUTPATIENT
Start: 2024-06-18

## 2024-06-11 RX ORDER — CYANOCOBALAMIN 1000 UG/ML
1000 INJECTION, SOLUTION INTRAMUSCULAR; SUBCUTANEOUS ONCE
OUTPATIENT
Start: 2024-06-18

## 2024-06-11 RX ORDER — FAMOTIDINE 10 MG/ML
20 INJECTION INTRAVENOUS ONCE AS NEEDED
Status: CANCELLED | OUTPATIENT
Start: 2024-06-11

## 2024-06-11 RX ORDER — DIPHENHYDRAMINE HYDROCHLORIDE 50 MG/ML
50 INJECTION INTRAMUSCULAR; INTRAVENOUS AS NEEDED
OUTPATIENT
Start: 2024-06-18

## 2024-06-11 RX ORDER — DIPHENHYDRAMINE HYDROCHLORIDE 50 MG/ML
50 INJECTION INTRAMUSCULAR; INTRAVENOUS AS NEEDED
Status: CANCELLED | OUTPATIENT
Start: 2024-06-11

## 2024-06-11 RX ORDER — EPINEPHRINE 0.3 MG/.3ML
0.3 INJECTION SUBCUTANEOUS EVERY 5 MIN PRN
Status: CANCELLED | OUTPATIENT
Start: 2024-06-11

## 2024-06-11 RX ORDER — ALBUTEROL SULFATE 0.83 MG/ML
3 SOLUTION RESPIRATORY (INHALATION) AS NEEDED
OUTPATIENT
Start: 2024-06-11

## 2024-06-11 RX ORDER — ALBUTEROL SULFATE 0.83 MG/ML
3 SOLUTION RESPIRATORY (INHALATION) AS NEEDED
Status: CANCELLED | OUTPATIENT
Start: 2024-06-11

## 2024-06-11 RX ORDER — HEPARIN 100 UNIT/ML
500 SYRINGE INTRAVENOUS AS NEEDED
OUTPATIENT
Start: 2024-06-11

## 2024-06-11 RX ORDER — EPINEPHRINE 0.3 MG/.3ML
0.3 INJECTION SUBCUTANEOUS EVERY 5 MIN PRN
OUTPATIENT
Start: 2024-06-18

## 2024-06-11 RX ORDER — EPINEPHRINE 0.3 MG/.3ML
0.3 INJECTION SUBCUTANEOUS EVERY 5 MIN PRN
OUTPATIENT
Start: 2024-06-11

## 2024-06-11 RX ORDER — DIPHENHYDRAMINE HYDROCHLORIDE 50 MG/ML
50 INJECTION INTRAMUSCULAR; INTRAVENOUS AS NEEDED
OUTPATIENT
Start: 2024-06-11

## 2024-06-11 RX ORDER — CYANOCOBALAMIN 1000 UG/ML
1000 INJECTION, SOLUTION INTRAMUSCULAR; SUBCUTANEOUS ONCE
Status: CANCELLED | OUTPATIENT
Start: 2024-06-11

## 2024-06-11 RX ADMIN — CYANOCOBALAMIN 1000 MCG: 1000 INJECTION, SOLUTION INTRAMUSCULAR; SUBCUTANEOUS at 09:30

## 2024-06-11 ASSESSMENT — COLUMBIA-SUICIDE SEVERITY RATING SCALE - C-SSRS
1. IN THE PAST MONTH, HAVE YOU WISHED YOU WERE DEAD OR WISHED YOU COULD GO TO SLEEP AND NOT WAKE UP?: NO
2. HAVE YOU ACTUALLY HAD ANY THOUGHTS OF KILLING YOURSELF?: NO
6. HAVE YOU EVER DONE ANYTHING, STARTED TO DO ANYTHING, OR PREPARED TO DO ANYTHING TO END YOUR LIFE?: NO

## 2024-06-11 ASSESSMENT — PAIN SCALES - GENERAL: PAINLEVEL: 0-NO PAIN

## 2024-06-11 NOTE — PATIENT INSTRUCTIONS
B12 Injection weekly x 4, then monthly for 6 months  IV iron to be administered upon insurance approval   Labs today   Referral to [facility], for skin cancer screening  RTC in 3-4 weeks to discuss results

## 2024-06-11 NOTE — PROGRESS NOTES
Labs drawn today at visit  Pt went to Infusion after her visit for a B12 injection  Waiting on IV iron to be approved- I will call pt to schedule along with her B 12 injections. Follow up with labs-   Reviewed AVS with patient- patient verbalizes understanding  Referral to Derm - [facility] [place]- they will call you to schedule  Reviewed AVS with patient- patient verbalizes understanding

## 2024-06-12 LAB — HAPTOGLOB SERPL-MCNC: 194 MG/DL (ref 37–246)

## 2024-06-12 PROCEDURE — 82270 OCCULT BLOOD FECES: CPT | Mod: SAMLAB

## 2024-06-13 LAB
ANA SER QL HEP2 SUBST: NEGATIVE
EPO SERPL-ACNC: 95 MU/ML (ref 4–27)
HEMOCCULT SP1 STL QL: NEGATIVE

## 2024-06-13 ASSESSMENT — ENCOUNTER SYMPTOMS
FATIGUE: 1
EYES NEGATIVE: 1
SHORTNESS OF BREATH: 1

## 2024-06-16 LAB — METHYLMALONATE SERPL-SCNC: 0.19 UMOL/L (ref 0–0.4)

## 2024-06-16 ASSESSMENT — ENCOUNTER SYMPTOMS
ARTHRALGIAS: 1
GASTROINTESTINAL NEGATIVE: 1
DIZZINESS: 1
APPETITE CHANGE: 1
ENDOCRINE NEGATIVE: 1
BRUISES/BLEEDS EASILY: 1
MYALGIAS: 1
PSYCHIATRIC NEGATIVE: 1
LIGHT-HEADEDNESS: 1
CARDIOVASCULAR NEGATIVE: 1

## 2024-06-18 ENCOUNTER — INFUSION (OUTPATIENT)
Dept: HEMATOLOGY/ONCOLOGY | Facility: CLINIC | Age: 64
End: 2024-06-18
Payer: MEDICARE

## 2024-06-18 ENCOUNTER — APPOINTMENT (OUTPATIENT)
Dept: PRIMARY CARE | Facility: CLINIC | Age: 64
End: 2024-06-18
Payer: MEDICARE

## 2024-06-18 ENCOUNTER — LAB (OUTPATIENT)
Dept: LAB | Facility: LAB | Age: 64
End: 2024-06-18
Payer: MEDICARE

## 2024-06-18 VITALS
DIASTOLIC BLOOD PRESSURE: 70 MMHG | SYSTOLIC BLOOD PRESSURE: 138 MMHG | TEMPERATURE: 96.8 F | OXYGEN SATURATION: 97 % | RESPIRATION RATE: 18 BRPM | HEART RATE: 91 BPM

## 2024-06-18 VITALS
HEIGHT: 67 IN | DIASTOLIC BLOOD PRESSURE: 62 MMHG | SYSTOLIC BLOOD PRESSURE: 130 MMHG | HEART RATE: 82 BPM | OXYGEN SATURATION: 96 % | BODY MASS INDEX: 33.92 KG/M2 | WEIGHT: 216.1 LBS

## 2024-06-18 DIAGNOSIS — R06.09 DYSPNEA ON EXERTION: ICD-10-CM

## 2024-06-18 DIAGNOSIS — D64.9 ANEMIA, UNSPECIFIED TYPE: ICD-10-CM

## 2024-06-18 DIAGNOSIS — R60.0 BILATERAL LEG EDEMA: ICD-10-CM

## 2024-06-18 DIAGNOSIS — E53.8 B12 DEFICIENCY: ICD-10-CM

## 2024-06-18 DIAGNOSIS — R06.09 DYSPNEA ON EXERTION: Primary | ICD-10-CM

## 2024-06-18 LAB
ALBUMIN SERPL BCP-MCNC: 3.7 G/DL (ref 3.4–5)
ALP SERPL-CCNC: 104 U/L (ref 33–136)
ALT SERPL W P-5'-P-CCNC: 22 U/L (ref 7–45)
ANION GAP SERPL CALC-SCNC: 13 MMOL/L (ref 10–20)
AST SERPL W P-5'-P-CCNC: 34 U/L (ref 9–39)
BASOPHILS # BLD MANUAL: 0.1 X10*3/UL (ref 0–0.1)
BASOPHILS NFR BLD MANUAL: 1 %
BILIRUB SERPL-MCNC: 0.5 MG/DL (ref 0–1.2)
BUN SERPL-MCNC: 11 MG/DL (ref 6–23)
BURR CELLS BLD QL SMEAR: ABNORMAL
CALCIUM SERPL-MCNC: 9.9 MG/DL (ref 8.6–10.3)
CHLORIDE SERPL-SCNC: 99 MMOL/L (ref 98–107)
CO2 SERPL-SCNC: 27 MMOL/L (ref 21–32)
CREAT SERPL-MCNC: 0.93 MG/DL (ref 0.5–1.05)
EGFRCR SERPLBLD CKD-EPI 2021: 68 ML/MIN/1.73M*2
EOSINOPHIL # BLD MANUAL: 0.29 X10*3/UL (ref 0–0.7)
EOSINOPHIL NFR BLD MANUAL: 3 %
ERYTHROCYTE [DISTWIDTH] IN BLOOD BY AUTOMATED COUNT: 20.2 % (ref 11.5–14.5)
GLUCOSE SERPL-MCNC: 267 MG/DL (ref 74–99)
HCT VFR BLD AUTO: 33.8 % (ref 36–46)
HGB BLD-MCNC: 9 G/DL (ref 12–16)
HYPOCHROMIA BLD QL SMEAR: ABNORMAL
IMM GRANULOCYTES # BLD AUTO: 0.07 X10*3/UL (ref 0–0.7)
IMM GRANULOCYTES NFR BLD AUTO: 0.7 % (ref 0–0.9)
LYMPHOCYTES # BLD MANUAL: 1.52 X10*3/UL (ref 1.2–4.8)
LYMPHOCYTES NFR BLD MANUAL: 16 %
MCH RBC QN AUTO: 18.3 PG (ref 26–34)
MCHC RBC AUTO-ENTMCNC: 26.6 G/DL (ref 32–36)
MCV RBC AUTO: 69 FL (ref 80–100)
MONOCYTES # BLD MANUAL: 0.48 X10*3/UL (ref 0.1–1)
MONOCYTES NFR BLD MANUAL: 5 %
NEUTS SEG # BLD MANUAL: 7.13 X10*3/UL (ref 1.2–7)
NEUTS SEG NFR BLD MANUAL: 75 %
NRBC BLD-RTO: 0 /100 WBCS (ref 0–0)
PLATELET # BLD AUTO: 293 X10*3/UL (ref 150–450)
POTASSIUM SERPL-SCNC: 4.1 MMOL/L (ref 3.5–5.3)
PROT SERPL-MCNC: 6.5 G/DL (ref 6.4–8.2)
RBC # BLD AUTO: 4.91 X10*6/UL (ref 4–5.2)
RBC MORPH BLD: ABNORMAL
SODIUM SERPL-SCNC: 135 MMOL/L (ref 136–145)
TOTAL CELLS COUNTED BLD: 100
WBC # BLD AUTO: 9.5 X10*3/UL (ref 4.4–11.3)

## 2024-06-18 PROCEDURE — 3075F SYST BP GE 130 - 139MM HG: CPT | Performed by: FAMILY MEDICINE

## 2024-06-18 PROCEDURE — 3052F HG A1C>EQUAL 8.0%<EQUAL 9.0%: CPT | Performed by: FAMILY MEDICINE

## 2024-06-18 PROCEDURE — 85007 BL SMEAR W/DIFF WBC COUNT: CPT

## 2024-06-18 PROCEDURE — 36415 COLL VENOUS BLD VENIPUNCTURE: CPT

## 2024-06-18 PROCEDURE — 1160F RVW MEDS BY RX/DR IN RCRD: CPT | Performed by: FAMILY MEDICINE

## 2024-06-18 PROCEDURE — 3050F LDL-C >= 130 MG/DL: CPT | Performed by: FAMILY MEDICINE

## 2024-06-18 PROCEDURE — 83880 ASSAY OF NATRIURETIC PEPTIDE: CPT

## 2024-06-18 PROCEDURE — 96372 THER/PROPH/DIAG INJ SC/IM: CPT

## 2024-06-18 PROCEDURE — 85027 COMPLETE CBC AUTOMATED: CPT

## 2024-06-18 PROCEDURE — 99214 OFFICE O/P EST MOD 30 MIN: CPT | Performed by: FAMILY MEDICINE

## 2024-06-18 PROCEDURE — 80053 COMPREHEN METABOLIC PANEL: CPT

## 2024-06-18 PROCEDURE — 3078F DIAST BP <80 MM HG: CPT | Performed by: FAMILY MEDICINE

## 2024-06-18 PROCEDURE — 2500000004 HC RX 250 GENERAL PHARMACY W/ HCPCS (ALT 636 FOR OP/ED)

## 2024-06-18 PROCEDURE — 1159F MED LIST DOCD IN RCRD: CPT | Performed by: FAMILY MEDICINE

## 2024-06-18 RX ORDER — CYANOCOBALAMIN 1000 UG/ML
1000 INJECTION, SOLUTION INTRAMUSCULAR; SUBCUTANEOUS ONCE
OUTPATIENT
Start: 2024-06-25

## 2024-06-18 RX ORDER — CYANOCOBALAMIN 1000 UG/ML
1000 INJECTION, SOLUTION INTRAMUSCULAR; SUBCUTANEOUS ONCE
Status: COMPLETED | OUTPATIENT
Start: 2024-06-18 | End: 2024-06-18

## 2024-06-18 RX ORDER — FAMOTIDINE 10 MG/ML
20 INJECTION INTRAVENOUS ONCE AS NEEDED
OUTPATIENT
Start: 2024-06-25

## 2024-06-18 RX ORDER — EPINEPHRINE 0.3 MG/.3ML
0.3 INJECTION SUBCUTANEOUS EVERY 5 MIN PRN
OUTPATIENT
Start: 2024-06-25

## 2024-06-18 RX ORDER — DIPHENHYDRAMINE HYDROCHLORIDE 50 MG/ML
50 INJECTION INTRAMUSCULAR; INTRAVENOUS AS NEEDED
OUTPATIENT
Start: 2024-06-25

## 2024-06-18 RX ORDER — ALBUTEROL SULFATE 0.83 MG/ML
3 SOLUTION RESPIRATORY (INHALATION) AS NEEDED
OUTPATIENT
Start: 2024-06-25

## 2024-06-18 ASSESSMENT — ENCOUNTER SYMPTOMS
LIGHT-HEADEDNESS: 0
UNEXPECTED WEIGHT CHANGE: 0
VOMITING: 0
DIARRHEA: 0
SHORTNESS OF BREATH: 1
WHEEZING: 0
HEADACHES: 0
TROUBLE SWALLOWING: 0
ABDOMINAL DISTENTION: 0
ARTHRALGIAS: 0
APPETITE CHANGE: 0
NAUSEA: 0
COUGH: 0
PALPITATIONS: 1
DIFFICULTY URINATING: 0
ADENOPATHY: 0
CONSTIPATION: 0
ACTIVITY CHANGE: 0
NUMBNESS: 0
ABDOMINAL PAIN: 0
FATIGUE: 1
RHINORRHEA: 0
DIZZINESS: 0

## 2024-06-18 ASSESSMENT — PAIN SCALES - GENERAL: PAINLEVEL: 0-NO PAIN

## 2024-06-18 NOTE — PROGRESS NOTES
"Subjective   Patient ID: Ana Melvin is a 64 y.o. female who presents for 2 WK CK.    HPI   Seen hematology, to start Fe infusions/B12 infusions  To have scopes in [place] (has an appointment in 2 weeks)  Swelling in feet, SOB, fatigue, been worse over the past month  Throat better  Having some palpitations    Review of Systems   Constitutional:  Positive for fatigue. Negative for activity change, appetite change and unexpected weight change.   HENT:  Negative for ear pain, nosebleeds, rhinorrhea, sneezing and trouble swallowing.    Respiratory:  Positive for shortness of breath. Negative for cough and wheezing.    Cardiovascular:  Positive for palpitations and leg swelling. Negative for chest pain.   Gastrointestinal:  Negative for abdominal distention, abdominal pain, constipation, diarrhea, nausea and vomiting.   Genitourinary:  Negative for difficulty urinating.   Musculoskeletal:  Negative for arthralgias.   Skin:  Negative for rash.   Neurological:  Negative for dizziness, light-headedness, numbness and headaches.   Hematological:  Negative for adenopathy.   Psychiatric/Behavioral:  Negative for behavioral problems.    All other systems reviewed and are negative.      Objective   /62   Pulse 82   Ht 1.689 m (5' 6.5\")   Wt 98 kg (216 lb 1.6 oz)   SpO2 96%   BMI 34.36 kg/m²     Physical Exam  Vitals and nursing note reviewed.   Constitutional:       General: She is not in acute distress.     Appearance: Normal appearance. She is not toxic-appearing.   HENT:      Head: Normocephalic and atraumatic.      Right Ear: Tympanic membrane, ear canal and external ear normal.      Left Ear: Tympanic membrane, ear canal and external ear normal.      Nose: Nose normal.      Mouth/Throat:      Mouth: Mucous membranes are moist.      Pharynx: Oropharynx is clear.   Eyes:      Extraocular Movements: Extraocular movements intact.      Conjunctiva/sclera: Conjunctivae normal.      Pupils: Pupils are equal, round, and " reactive to light.   Cardiovascular:      Rate and Rhythm: Normal rate and regular rhythm.      Pulses: Normal pulses.      Heart sounds: Normal heart sounds.      Comments: +1-2 edema slightly above the ankles bilaterally.  Pulmonary:      Effort: Pulmonary effort is normal.      Breath sounds: Normal breath sounds.   Abdominal:      General: Abdomen is flat. Bowel sounds are normal.      Palpations: Abdomen is soft.      Hernia: A hernia is present.      Comments: Small reducible umbilical hernia.   Musculoskeletal:      Cervical back: Normal range of motion and neck supple.   Skin:     General: Skin is warm and dry.      Capillary Refill: Capillary refill takes less than 2 seconds.   Neurological:      General: No focal deficit present.      Mental Status: She is alert and oriented to person, place, and time. Mental status is at baseline.   Psychiatric:         Mood and Affect: Mood normal.         Behavior: Behavior normal.         Assessment/Plan   Problem List Items Addressed This Visit             ICD-10-CM    Anemia D64.9     Continue with the evaluation, supposed to get EGD colonoscopy in the next month, seen hematology, is waiting to get iron infusions.  Hopefully this will help with her shortness of breath and fatigue.         Relevant Orders    CBC and Auto Differential    Comprehensive Metabolic Panel     Other Visit Diagnoses         Codes    Dyspnea on exertion    -  Primary R06.09    Recheck labs, check echocardiogram.     Relevant Orders    Comprehensive Metabolic Panel    B-type natriuretic peptide    Transthoracic Echo Complete    Bilateral leg edema     R60.0    Check labs and echo, increasing Lasix has not helped.     Relevant Orders    Comprehensive Metabolic Panel    B-type natriuretic peptide    Transthoracic Echo Complete

## 2024-06-18 NOTE — ASSESSMENT & PLAN NOTE
Continue with the evaluation, supposed to get EGD colonoscopy in the next month, seen hematology, is waiting to get iron infusions.  Hopefully this will help with her shortness of breath and fatigue.

## 2024-06-19 LAB — BNP SERPL-MCNC: 57 PG/ML (ref 0–99)

## 2024-06-20 ENCOUNTER — TELEPHONE (OUTPATIENT)
Dept: HEMATOLOGY/ONCOLOGY | Facility: CLINIC | Age: 64
End: 2024-06-20
Payer: MEDICARE

## 2024-06-20 LAB — VIT C SERPL-MCNC: 15 UMOL/L (ref 23–114)

## 2024-06-21 ENCOUNTER — TELEPHONE (OUTPATIENT)
Dept: PRIMARY CARE | Facility: CLINIC | Age: 64
End: 2024-06-21
Payer: MEDICARE

## 2024-06-21 NOTE — TELEPHONE ENCOUNTER
PT LMOM STATING THAT SWELLING IN LEGS ARE SO BAD AND THAT WITH WALKING IT HURTS SO BAD IT TAKES HER BREATHE AWAY.  PT HAS ECHO SCHEDULED FOR JULY 1, BUT WAS WONDERING IF THERE IS SOMETHING SHE CAN DO IN THE MEANTIME TO MANAGE THE SWELLING AND PAIN, PLEASE ADVISE

## 2024-06-25 ENCOUNTER — INFUSION (OUTPATIENT)
Dept: HEMATOLOGY/ONCOLOGY | Facility: CLINIC | Age: 64
End: 2024-06-25
Payer: MEDICARE

## 2024-06-25 VITALS
DIASTOLIC BLOOD PRESSURE: 69 MMHG | HEART RATE: 90 BPM | SYSTOLIC BLOOD PRESSURE: 168 MMHG | WEIGHT: 216.05 LBS | RESPIRATION RATE: 18 BRPM | TEMPERATURE: 97 F | OXYGEN SATURATION: 97 % | BODY MASS INDEX: 34.35 KG/M2

## 2024-06-25 DIAGNOSIS — D64.9 ANEMIA, UNSPECIFIED TYPE: ICD-10-CM

## 2024-06-25 DIAGNOSIS — E53.8 B12 DEFICIENCY: ICD-10-CM

## 2024-06-25 PROCEDURE — 2500000004 HC RX 250 GENERAL PHARMACY W/ HCPCS (ALT 636 FOR OP/ED)

## 2024-06-25 PROCEDURE — 96372 THER/PROPH/DIAG INJ SC/IM: CPT

## 2024-06-25 RX ORDER — EPINEPHRINE 0.3 MG/.3ML
0.3 INJECTION SUBCUTANEOUS EVERY 5 MIN PRN
OUTPATIENT
Start: 2024-07-02

## 2024-06-25 RX ORDER — CYANOCOBALAMIN 1000 UG/ML
1000 INJECTION, SOLUTION INTRAMUSCULAR; SUBCUTANEOUS ONCE
Status: COMPLETED | OUTPATIENT
Start: 2024-06-25 | End: 2024-06-25

## 2024-06-25 RX ORDER — ALBUTEROL SULFATE 0.83 MG/ML
3 SOLUTION RESPIRATORY (INHALATION) AS NEEDED
OUTPATIENT
Start: 2024-07-02

## 2024-06-25 RX ORDER — DIPHENHYDRAMINE HYDROCHLORIDE 50 MG/ML
50 INJECTION INTRAMUSCULAR; INTRAVENOUS AS NEEDED
OUTPATIENT
Start: 2024-07-02

## 2024-06-25 RX ORDER — FAMOTIDINE 10 MG/ML
20 INJECTION INTRAVENOUS ONCE AS NEEDED
OUTPATIENT
Start: 2024-07-02

## 2024-06-25 RX ORDER — CYANOCOBALAMIN 1000 UG/ML
1000 INJECTION, SOLUTION INTRAMUSCULAR; SUBCUTANEOUS ONCE
OUTPATIENT
Start: 2024-07-02

## 2024-06-25 ASSESSMENT — PAIN SCALES - GENERAL: PAINLEVEL: 4

## 2024-06-26 ENCOUNTER — TELEPHONE (OUTPATIENT)
Dept: HEMATOLOGY/ONCOLOGY | Facility: CLINIC | Age: 64
End: 2024-06-26
Payer: MEDICARE

## 2024-06-26 DIAGNOSIS — D50.8 OTHER IRON DEFICIENCY ANEMIA: ICD-10-CM

## 2024-07-01 ENCOUNTER — HOSPITAL ENCOUNTER (OUTPATIENT)
Dept: CARDIOLOGY | Facility: HOSPITAL | Age: 64
Discharge: HOME | End: 2024-07-01
Payer: MEDICARE

## 2024-07-01 ENCOUNTER — LAB (OUTPATIENT)
Dept: LAB | Facility: LAB | Age: 64
End: 2024-07-01
Payer: MEDICARE

## 2024-07-01 ENCOUNTER — APPOINTMENT (OUTPATIENT)
Dept: PRIMARY CARE | Facility: CLINIC | Age: 64
End: 2024-07-01
Payer: MEDICARE

## 2024-07-01 VITALS
BODY MASS INDEX: 33.98 KG/M2 | DIASTOLIC BLOOD PRESSURE: 60 MMHG | OXYGEN SATURATION: 96 % | SYSTOLIC BLOOD PRESSURE: 130 MMHG | WEIGHT: 216.5 LBS | HEIGHT: 67 IN | HEART RATE: 86 BPM

## 2024-07-01 VITALS
RESPIRATION RATE: 18 BRPM | SYSTOLIC BLOOD PRESSURE: 155 MMHG | HEART RATE: 103 BPM | OXYGEN SATURATION: 96 % | DIASTOLIC BLOOD PRESSURE: 74 MMHG

## 2024-07-01 DIAGNOSIS — Z12.11 COLON CANCER SCREENING: ICD-10-CM

## 2024-07-01 DIAGNOSIS — R60.0 BILATERAL LEG EDEMA: ICD-10-CM

## 2024-07-01 DIAGNOSIS — K74.60 CIRRHOSIS OF LIVER WITH ASCITES, UNSPECIFIED HEPATIC CIRRHOSIS TYPE (MULTI): ICD-10-CM

## 2024-07-01 DIAGNOSIS — E11.9 TYPE 2 DIABETES MELLITUS WITHOUT COMPLICATION, UNSPECIFIED WHETHER LONG TERM INSULIN USE (MULTI): ICD-10-CM

## 2024-07-01 DIAGNOSIS — J43.2 CENTRILOBULAR EMPHYSEMA (MULTI): ICD-10-CM

## 2024-07-01 DIAGNOSIS — R06.09 DYSPNEA ON EXERTION: ICD-10-CM

## 2024-07-01 DIAGNOSIS — E87.6 HYPOKALEMIA: ICD-10-CM

## 2024-07-01 DIAGNOSIS — R06.02 SHORTNESS OF BREATH: ICD-10-CM

## 2024-07-01 DIAGNOSIS — D64.9 ANEMIA, UNSPECIFIED TYPE: ICD-10-CM

## 2024-07-01 DIAGNOSIS — Z72.0 TOBACCO ABUSE: ICD-10-CM

## 2024-07-01 DIAGNOSIS — E53.8 B12 DEFICIENCY: ICD-10-CM

## 2024-07-01 DIAGNOSIS — R10.11 RIGHT UPPER QUADRANT ABDOMINAL PAIN: ICD-10-CM

## 2024-07-01 DIAGNOSIS — R60.0 BILATERAL LEG EDEMA: Primary | ICD-10-CM

## 2024-07-01 DIAGNOSIS — K21.9 GASTROESOPHAGEAL REFLUX DISEASE, UNSPECIFIED WHETHER ESOPHAGITIS PRESENT: ICD-10-CM

## 2024-07-01 DIAGNOSIS — K59.04 CHRONIC IDIOPATHIC CONSTIPATION: ICD-10-CM

## 2024-07-01 DIAGNOSIS — R18.8 CIRRHOSIS OF LIVER WITH ASCITES, UNSPECIFIED HEPATIC CIRRHOSIS TYPE (MULTI): ICD-10-CM

## 2024-07-01 DIAGNOSIS — I47.10 PAROXYSMAL SVT (SUPRAVENTRICULAR TACHYCARDIA) (CMS-HCC): ICD-10-CM

## 2024-07-01 LAB
ALBUMIN SERPL BCP-MCNC: 3.7 G/DL (ref 3.4–5)
ALP SERPL-CCNC: 113 U/L (ref 33–136)
ALT SERPL W P-5'-P-CCNC: 21 U/L (ref 7–45)
ANION GAP SERPL CALC-SCNC: 13 MMOL/L (ref 10–20)
AORTIC VALVE MEAN GRADIENT: 6 MMHG
AORTIC VALVE PEAK VELOCITY: 1.64 M/S
AST SERPL W P-5'-P-CCNC: 29 U/L (ref 9–39)
AV PEAK GRADIENT: 10.8 MMHG
AVA (PEAK VEL): 1.84 CM2
AVA (VTI): 1.75 CM2
BASOPHILS # BLD AUTO: 0.04 X10*3/UL (ref 0–0.1)
BASOPHILS NFR BLD AUTO: 0.4 %
BILIRUB SERPL-MCNC: 0.3 MG/DL (ref 0–1.2)
BUN SERPL-MCNC: 11 MG/DL (ref 6–23)
CALCIUM SERPL-MCNC: 9.7 MG/DL (ref 8.6–10.3)
CHLORIDE SERPL-SCNC: 97 MMOL/L (ref 98–107)
CO2 SERPL-SCNC: 28 MMOL/L (ref 21–32)
CREAT SERPL-MCNC: 0.93 MG/DL (ref 0.5–1.05)
EGFRCR SERPLBLD CKD-EPI 2021: 68 ML/MIN/1.73M*2
EJECTION FRACTION APICAL 4 CHAMBER: 58.6
EJECTION FRACTION: 60 %
EOSINOPHIL # BLD AUTO: 0.24 X10*3/UL (ref 0–0.7)
EOSINOPHIL NFR BLD AUTO: 2.5 %
ERYTHROCYTE [DISTWIDTH] IN BLOOD BY AUTOMATED COUNT: 18.9 % (ref 11.5–14.5)
FERRITIN SERPL-MCNC: 17 NG/ML (ref 8–150)
GGT SERPL-CCNC: 114 U/L (ref 5–55)
GLUCOSE SERPL-MCNC: 310 MG/DL (ref 74–99)
HCT VFR BLD AUTO: 31 % (ref 36–46)
HGB BLD-MCNC: 8.6 G/DL (ref 12–16)
IMM GRANULOCYTES # BLD AUTO: 0.07 X10*3/UL (ref 0–0.7)
IMM GRANULOCYTES NFR BLD AUTO: 0.7 % (ref 0–0.9)
LEFT ATRIUM VOLUME AREA LENGTH INDEX BSA: 19.4 ML/M2
LEFT VENTRICLE INTERNAL DIMENSION DIASTOLE: 4.44 CM (ref 3.5–6)
LEFT VENTRICULAR OUTFLOW TRACT DIAMETER: 1.7 CM
LV EJECTION FRACTION BIPLANE: 52 %
LYMPHOCYTES # BLD AUTO: 2.14 X10*3/UL (ref 1.2–4.8)
LYMPHOCYTES NFR BLD AUTO: 22.1 %
MCH RBC QN AUTO: 18.4 PG (ref 26–34)
MCHC RBC AUTO-ENTMCNC: 27.7 G/DL (ref 32–36)
MCV RBC AUTO: 66 FL (ref 80–100)
MITRAL VALVE E/A RATIO: 0.89
MONOCYTES # BLD AUTO: 0.65 X10*3/UL (ref 0.1–1)
MONOCYTES NFR BLD AUTO: 6.7 %
NEUTROPHILS # BLD AUTO: 6.53 X10*3/UL (ref 1.2–7.7)
NEUTROPHILS NFR BLD AUTO: 67.6 %
NRBC BLD-RTO: 0 /100 WBCS (ref 0–0)
PLATELET # BLD AUTO: 335 X10*3/UL (ref 150–450)
POTASSIUM SERPL-SCNC: 4 MMOL/L (ref 3.5–5.3)
PROT SERPL-MCNC: 6.2 G/DL (ref 6.4–8.2)
RBC # BLD AUTO: 4.67 X10*6/UL (ref 4–5.2)
RIGHT VENTRICLE PEAK SYSTOLIC PRESSURE: 27.4 MMHG
SODIUM SERPL-SCNC: 134 MMOL/L (ref 136–145)
TRICUSPID ANNULAR PLANE SYSTOLIC EXCURSION: 2 CM
WBC # BLD AUTO: 9.7 X10*3/UL (ref 4.4–11.3)

## 2024-07-01 PROCEDURE — 85025 COMPLETE CBC W/AUTO DIFF WBC: CPT

## 2024-07-01 PROCEDURE — 80053 COMPREHEN METABOLIC PANEL: CPT

## 2024-07-01 PROCEDURE — 1159F MED LIST DOCD IN RCRD: CPT | Performed by: FAMILY MEDICINE

## 2024-07-01 PROCEDURE — 36415 COLL VENOUS BLD VENIPUNCTURE: CPT

## 2024-07-01 PROCEDURE — 3078F DIAST BP <80 MM HG: CPT | Performed by: FAMILY MEDICINE

## 2024-07-01 PROCEDURE — 82977 ASSAY OF GGT: CPT

## 2024-07-01 PROCEDURE — 82728 ASSAY OF FERRITIN: CPT

## 2024-07-01 PROCEDURE — 1160F RVW MEDS BY RX/DR IN RCRD: CPT | Performed by: FAMILY MEDICINE

## 2024-07-01 PROCEDURE — 3052F HG A1C>EQUAL 8.0%<EQUAL 9.0%: CPT | Performed by: FAMILY MEDICINE

## 2024-07-01 PROCEDURE — 93306 TTE W/DOPPLER COMPLETE: CPT | Performed by: STUDENT IN AN ORGANIZED HEALTH CARE EDUCATION/TRAINING PROGRAM

## 2024-07-01 PROCEDURE — 2500000004 HC RX 250 GENERAL PHARMACY W/ HCPCS (ALT 636 FOR OP/ED): Performed by: STUDENT IN AN ORGANIZED HEALTH CARE EDUCATION/TRAINING PROGRAM

## 2024-07-01 PROCEDURE — 93306 TTE W/DOPPLER COMPLETE: CPT

## 2024-07-01 PROCEDURE — 3050F LDL-C >= 130 MG/DL: CPT | Performed by: FAMILY MEDICINE

## 2024-07-01 PROCEDURE — 99214 OFFICE O/P EST MOD 30 MIN: CPT | Performed by: FAMILY MEDICINE

## 2024-07-01 PROCEDURE — 3075F SYST BP GE 130 - 139MM HG: CPT | Performed by: FAMILY MEDICINE

## 2024-07-01 RX ORDER — LACTULOSE 10 G/15ML
10 SOLUTION ORAL DAILY
Qty: 450 ML | Refills: 2 | Status: SHIPPED | OUTPATIENT
Start: 2024-07-01 | End: 2024-09-29

## 2024-07-01 ASSESSMENT — ENCOUNTER SYMPTOMS
DIARRHEA: 0
ACTIVITY CHANGE: 0
RHINORRHEA: 0
NAUSEA: 0
PALPITATIONS: 0
ABDOMINAL DISTENTION: 0
ADENOPATHY: 0
NUMBNESS: 0
WHEEZING: 0
SHORTNESS OF BREATH: 1
DYSPHORIC MOOD: 0
TROUBLE SWALLOWING: 0
UNEXPECTED WEIGHT CHANGE: 0
DIFFICULTY URINATING: 0
ABDOMINAL PAIN: 0
SLEEP DISTURBANCE: 0
CONSTIPATION: 0
APPETITE CHANGE: 0
FATIGUE: 1
COUGH: 0
ARTHRALGIAS: 0
HEADACHES: 0
DIZZINESS: 0
NERVOUS/ANXIOUS: 0
VOMITING: 0
MYALGIAS: 1
LIGHT-HEADEDNESS: 0

## 2024-07-01 NOTE — ASSESSMENT & PLAN NOTE
Check blood testing, patient to start iron infusions later this week, desperately needs EGD colonoscopy due to finding of source of the patient's anemia.  Believe that most of her symptoms currently including fatigue, shortness of breath and leg cramps are due to her anemia.  Patient states that she has an appointment to see gastroenterology in the next few weeks, if blood count has continued to deteriorate will contact GI office for urgent scope.

## 2024-07-01 NOTE — ASSESSMENT & PLAN NOTE
Again counseled at length about smoking cessation, recently started Anoro Ellipta, still using albuterol fairly frequently, encouraged to quit smoking.

## 2024-07-01 NOTE — PROGRESS NOTES
"Subjective   Patient ID: Ana Melvin is a 64 y.o. female who presents for 3 MO LABS.    HPI   To get iron tomorrow, to see GI mid July  + CORONA/edema some better, took 3 furosemide for 4 days, taking one a day now  Burning in feet and legs with walking  Coughing for a long time, started using Anoro, using albuterol several times a day, still smoking despite  (1 ppd), not checking BS, feels sick if eats too much, full quickly  Sharp pain right shoulder blade, some loose stools and bloating, no blood or melana with stool    Review of Systems   Constitutional:  Positive for fatigue. Negative for activity change, appetite change and unexpected weight change.   HENT:  Negative for ear pain, nosebleeds, rhinorrhea, sneezing and trouble swallowing.    Respiratory:  Positive for shortness of breath. Negative for cough and wheezing.    Cardiovascular:  Negative for chest pain, palpitations and leg swelling.   Gastrointestinal:  Negative for abdominal distention, abdominal pain, constipation, diarrhea, nausea and vomiting.   Genitourinary:  Negative for difficulty urinating.   Musculoskeletal:  Positive for myalgias. Negative for arthralgias.   Skin:  Negative for rash.   Neurological:  Negative for dizziness, light-headedness, numbness and headaches.   Hematological:  Negative for adenopathy.   Psychiatric/Behavioral:  Negative for behavioral problems, dysphoric mood and sleep disturbance. The patient is not nervous/anxious.    All other systems reviewed and are negative.      Objective   /60   Pulse 86   Ht 1.689 m (5' 6.5\")   Wt 98.2 kg (216 lb 8 oz)   SpO2 96%   BMI 34.42 kg/m²     Physical Exam  Vitals and nursing note reviewed.   Constitutional:       General: She is not in acute distress.     Appearance: Normal appearance. She is not toxic-appearing.   HENT:      Head: Normocephalic and atraumatic.      Right Ear: Tympanic membrane, ear canal and external ear normal.      Left Ear: Tympanic membrane, " ear canal and external ear normal.      Nose: Nose normal.      Mouth/Throat:      Mouth: Mucous membranes are moist.      Pharynx: Oropharynx is clear.   Eyes:      Extraocular Movements: Extraocular movements intact.      Conjunctiva/sclera: Conjunctivae normal.      Pupils: Pupils are equal, round, and reactive to light.   Cardiovascular:      Rate and Rhythm: Normal rate and regular rhythm.      Pulses: Normal pulses.      Heart sounds: Normal heart sounds.   Pulmonary:      Effort: Pulmonary effort is normal.      Breath sounds: Normal breath sounds.   Abdominal:      General: Abdomen is flat. Bowel sounds are normal.      Palpations: Abdomen is soft.   Musculoskeletal:      Cervical back: Normal range of motion and neck supple.   Skin:     General: Skin is warm and dry.      Capillary Refill: Capillary refill takes less than 2 seconds.   Neurological:      General: No focal deficit present.      Mental Status: She is alert and oriented to person, place, and time. Mental status is at baseline.   Psychiatric:         Mood and Affect: Mood normal.         Behavior: Behavior normal.         Assessment/Plan   Problem List Items Addressed This Visit             ICD-10-CM    COPD (chronic obstructive pulmonary disease) (Multi) J44.9     Again counseled at length about smoking cessation, recently started Anoro Ellipta, still using albuterol fairly frequently, encouraged to quit smoking.         Relevant Orders    Follow Up In Primary Care - Established    Diabetes mellitus (Multi) E11.9    Relevant Orders    Follow Up In Primary Care - Established    Comprehensive Metabolic Panel    GERD (gastroesophageal reflux disease) K21.9    Relevant Orders    Follow Up In Primary Care - Established    Comprehensive Metabolic Panel    Gamma GT    Paroxysmal SVT (supraventricular tachycardia) (CMS-HCC) I47.10    Relevant Orders    Follow Up In Primary Care - Established    B12 deficiency E53.8    Relevant Orders    Follow Up In  Primary Care - Established    Anemia D64.9     Check blood testing, patient to start iron infusions later this week, desperately needs EGD colonoscopy due to finding of source of the patient's anemia.  Believe that most of her symptoms currently including fatigue, shortness of breath and leg cramps are due to her anemia.  Patient states that she has an appointment to see gastroenterology in the next few weeks, if blood count has continued to deteriorate will contact GI office for urgent scope.         Relevant Orders    Follow Up In Primary Care - Established    CBC and Auto Differential    Ferritin     Other Visit Diagnoses         Codes    Bilateral leg edema    -  Primary R60.0    Swelling is actually improved, check creatinine, echocardiogram normal.     Relevant Orders    Follow Up In Primary Care - Established    Hypokalemia     E87.6    Relevant Orders    Follow Up In Primary Care - Established    Tobacco abuse     Z72.0    Relevant Orders    Follow Up In Primary Care - Established    Colon cancer screening     Z12.11    Relevant Orders    Follow Up In Primary Care - Established    Chronic idiopathic constipation     K59.04    Trial of lactulose.     Relevant Medications    lactulose 20 gram/30 mL oral solution    Other Relevant Orders    Follow Up In Primary Care - Established    Right upper quadrant abdominal pain     R10.11    Relevant Orders    Follow Up In Primary Care - Established    Comprehensive Metabolic Panel    Gamma GT    Cirrhosis of liver with ascites, unspecified hepatic cirrhosis type (Multi)     K74.60, R18.8    CT in the past suggested possible ascites, recheck liver function test along with GGT.     Relevant Orders    Gamma GT

## 2024-07-02 ENCOUNTER — APPOINTMENT (OUTPATIENT)
Dept: HEMATOLOGY/ONCOLOGY | Facility: CLINIC | Age: 64
End: 2024-07-02
Payer: MEDICARE

## 2024-07-02 ENCOUNTER — INFUSION (OUTPATIENT)
Dept: HEMATOLOGY/ONCOLOGY | Facility: CLINIC | Age: 64
End: 2024-07-02
Payer: MEDICARE

## 2024-07-02 ENCOUNTER — TELEPHONE (OUTPATIENT)
Dept: PRIMARY CARE | Facility: CLINIC | Age: 64
End: 2024-07-02

## 2024-07-02 VITALS
WEIGHT: 218.03 LBS | RESPIRATION RATE: 18 BRPM | BODY MASS INDEX: 34.66 KG/M2 | TEMPERATURE: 97 F | DIASTOLIC BLOOD PRESSURE: 74 MMHG | OXYGEN SATURATION: 94 % | HEART RATE: 89 BPM | SYSTOLIC BLOOD PRESSURE: 137 MMHG

## 2024-07-02 DIAGNOSIS — E61.1 IRON DEFICIENCY: ICD-10-CM

## 2024-07-02 DIAGNOSIS — E53.8 B12 DEFICIENCY: ICD-10-CM

## 2024-07-02 DIAGNOSIS — D64.9 ANEMIA, UNSPECIFIED TYPE: ICD-10-CM

## 2024-07-02 DIAGNOSIS — D64.9 ANEMIA, UNSPECIFIED TYPE: Primary | ICD-10-CM

## 2024-07-02 PROCEDURE — 2500000004 HC RX 250 GENERAL PHARMACY W/ HCPCS (ALT 636 FOR OP/ED)

## 2024-07-02 PROCEDURE — 2500000004 HC RX 250 GENERAL PHARMACY W/ HCPCS (ALT 636 FOR OP/ED): Mod: JZ

## 2024-07-02 PROCEDURE — 96372 THER/PROPH/DIAG INJ SC/IM: CPT

## 2024-07-02 PROCEDURE — 96365 THER/PROPH/DIAG IV INF INIT: CPT

## 2024-07-02 RX ORDER — EPINEPHRINE 0.3 MG/.3ML
0.3 INJECTION SUBCUTANEOUS EVERY 5 MIN PRN
OUTPATIENT
Start: 2024-07-09

## 2024-07-02 RX ORDER — FAMOTIDINE 10 MG/ML
20 INJECTION INTRAVENOUS ONCE AS NEEDED
OUTPATIENT
Start: 2024-07-09

## 2024-07-02 RX ORDER — CYANOCOBALAMIN 1000 UG/ML
1000 INJECTION, SOLUTION INTRAMUSCULAR; SUBCUTANEOUS ONCE
Status: COMPLETED | OUTPATIENT
Start: 2024-07-02 | End: 2024-07-02

## 2024-07-02 RX ORDER — DIPHENHYDRAMINE HYDROCHLORIDE 50 MG/ML
50 INJECTION INTRAMUSCULAR; INTRAVENOUS AS NEEDED
OUTPATIENT
Start: 2024-08-02

## 2024-07-02 RX ORDER — EPINEPHRINE 0.3 MG/.3ML
0.3 INJECTION SUBCUTANEOUS EVERY 5 MIN PRN
OUTPATIENT
Start: 2024-08-02

## 2024-07-02 RX ORDER — DIPHENHYDRAMINE HYDROCHLORIDE 50 MG/ML
50 INJECTION INTRAMUSCULAR; INTRAVENOUS AS NEEDED
OUTPATIENT
Start: 2024-07-09

## 2024-07-02 RX ORDER — ALBUTEROL SULFATE 0.83 MG/ML
3 SOLUTION RESPIRATORY (INHALATION) AS NEEDED
OUTPATIENT
Start: 2024-07-09

## 2024-07-02 RX ORDER — ALBUTEROL SULFATE 0.83 MG/ML
3 SOLUTION RESPIRATORY (INHALATION) AS NEEDED
OUTPATIENT
Start: 2024-08-02

## 2024-07-02 RX ORDER — HEPARIN SODIUM,PORCINE/PF 10 UNIT/ML
50 SYRINGE (ML) INTRAVENOUS AS NEEDED
OUTPATIENT
Start: 2024-07-02

## 2024-07-02 RX ORDER — HEPARIN 100 UNIT/ML
500 SYRINGE INTRAVENOUS AS NEEDED
OUTPATIENT
Start: 2024-07-02

## 2024-07-02 RX ORDER — CYANOCOBALAMIN 1000 UG/ML
1000 INJECTION, SOLUTION INTRAMUSCULAR; SUBCUTANEOUS ONCE
OUTPATIENT
Start: 2024-08-02

## 2024-07-02 RX ORDER — CYANOCOBALAMIN 1000 UG/ML
INJECTION, SOLUTION INTRAMUSCULAR; SUBCUTANEOUS
Status: COMPLETED
Start: 2024-07-02 | End: 2024-07-02

## 2024-07-02 RX ORDER — CYANOCOBALAMIN 1000 UG/ML
1000 INJECTION, SOLUTION INTRAMUSCULAR; SUBCUTANEOUS ONCE
Status: CANCELLED | OUTPATIENT
Start: 2024-07-09

## 2024-07-02 RX ORDER — FAMOTIDINE 10 MG/ML
20 INJECTION INTRAVENOUS ONCE AS NEEDED
OUTPATIENT
Start: 2024-08-02

## 2024-07-02 ASSESSMENT — PAIN SCALES - GENERAL: PAINLEVEL: 3

## 2024-07-02 NOTE — RESULT ENCOUNTER NOTE
Patient's hemoglobin has dropped some more.  I believe she has an iron infusion this week.  When is her GI evaluation in [place]?

## 2024-07-02 NOTE — TELEPHONE ENCOUNTER
----- Message from Hasmukh Stewart MD sent at 7/2/2024  7:57 AM EDT -----  Patient's hemoglobin has dropped some more.  I believe she has an iron infusion this week.  When is her GI evaluation in [place]?

## 2024-07-02 NOTE — TELEPHONE ENCOUNTER
Pt states she had iron infusion this am and she has not been able to reach the office just yet but will do that today and call and let us know when her appt is. She will let them know what is going on to get in sooner.

## 2024-07-03 ENCOUNTER — TELEPHONE (OUTPATIENT)
Dept: PRIMARY CARE | Facility: CLINIC | Age: 64
End: 2024-07-03
Payer: MEDICARE

## 2024-07-03 NOTE — TELEPHONE ENCOUNTER
FYI PT STATES SHE CALLED TO GET COLONOSCOPY EARLIER AND COULDN'T GET ANY SOONER.  SHE WAS ABLE TO BE PUT ON THE CANCELLATION LIST.

## 2024-07-09 ENCOUNTER — INFUSION (OUTPATIENT)
Dept: HEMATOLOGY/ONCOLOGY | Facility: CLINIC | Age: 64
End: 2024-07-09
Payer: MEDICARE

## 2024-07-09 VITALS
HEART RATE: 77 BPM | RESPIRATION RATE: 16 BRPM | TEMPERATURE: 97.2 F | OXYGEN SATURATION: 97 % | DIASTOLIC BLOOD PRESSURE: 72 MMHG | SYSTOLIC BLOOD PRESSURE: 136 MMHG

## 2024-07-09 DIAGNOSIS — E53.8 B12 DEFICIENCY: ICD-10-CM

## 2024-07-09 DIAGNOSIS — D64.9 ANEMIA, UNSPECIFIED TYPE: ICD-10-CM

## 2024-07-09 DIAGNOSIS — E61.1 IRON DEFICIENCY: ICD-10-CM

## 2024-07-09 PROCEDURE — 2500000004 HC RX 250 GENERAL PHARMACY W/ HCPCS (ALT 636 FOR OP/ED)

## 2024-07-09 PROCEDURE — 96365 THER/PROPH/DIAG IV INF INIT: CPT

## 2024-07-09 RX ORDER — EPINEPHRINE 0.3 MG/.3ML
0.3 INJECTION SUBCUTANEOUS EVERY 5 MIN PRN
OUTPATIENT
Start: 2024-07-09

## 2024-07-09 RX ORDER — FAMOTIDINE 10 MG/ML
20 INJECTION INTRAVENOUS ONCE AS NEEDED
OUTPATIENT
Start: 2024-07-09

## 2024-07-09 RX ORDER — HEPARIN 100 UNIT/ML
500 SYRINGE INTRAVENOUS AS NEEDED
OUTPATIENT
Start: 2024-07-09

## 2024-07-09 RX ORDER — ALBUTEROL SULFATE 0.83 MG/ML
3 SOLUTION RESPIRATORY (INHALATION) AS NEEDED
OUTPATIENT
Start: 2024-07-09

## 2024-07-09 RX ORDER — HEPARIN SODIUM,PORCINE/PF 10 UNIT/ML
50 SYRINGE (ML) INTRAVENOUS AS NEEDED
OUTPATIENT
Start: 2024-07-09

## 2024-07-09 RX ORDER — DIPHENHYDRAMINE HYDROCHLORIDE 50 MG/ML
50 INJECTION INTRAMUSCULAR; INTRAVENOUS AS NEEDED
OUTPATIENT
Start: 2024-07-09

## 2024-07-09 ASSESSMENT — PAIN SCALES - GENERAL: PAINLEVEL: 0-NO PAIN

## 2024-07-16 ENCOUNTER — HOSPITAL ENCOUNTER (OUTPATIENT)
Age: 64
Discharge: HOME | End: 2024-07-16
Payer: MEDICARE

## 2024-07-16 DIAGNOSIS — D64.9: ICD-10-CM

## 2024-07-16 DIAGNOSIS — R14.0: ICD-10-CM

## 2024-07-16 DIAGNOSIS — Z12.11: Primary | ICD-10-CM

## 2024-07-16 LAB
ALANINE AMINOTRANSFER ALT/SGPT: 33 U/L (ref 13–56)
ALBUMIN SERPL-MCNC: 3.2 G/DL (ref 3.2–5)
ALKALINE PHOSPHATASE: 127 U/L (ref 45–117)
ANION GAP: 9 (ref 5–15)
AST(SGOT): 33 U/L (ref 15–37)
BUN SERPL-MCNC: 10 MG/DL (ref 7–18)
BUN/CREAT RATIO: 10.8 RATIO (ref 10–20)
CALCIUM SERPL-MCNC: 10.3 MG/DL (ref 8.5–10.1)
CARBON DIOXIDE: 26 MMOL/L (ref 21–32)
CHLORIDE: 100 MMOL/L (ref 98–107)
CRP SERPL-MCNC: 32.6 MG/L (ref 0–3)
EST GLOM FILT RATE - AFR AMER: 78 ML/MIN (ref 60–?)
GLOBULIN: 4.2 G/DL (ref 2.2–4.2)
GLUCOSE: 211 MG/DL (ref 74–106)
LDH: 132 U/L (ref 84–246)
POTASSIUM: 3.8 MMOL/L (ref 3.5–5.1)

## 2024-07-16 PROCEDURE — 83615 LACTATE (LD) (LDH) ENZYME: CPT

## 2024-07-16 PROCEDURE — 86140 C-REACTIVE PROTEIN: CPT

## 2024-07-16 PROCEDURE — 86256 FLUORESCENT ANTIBODY TITER: CPT

## 2024-07-16 PROCEDURE — 86005 ALLG SPEC IGE MULTIALLG SCR: CPT

## 2024-07-16 PROCEDURE — 86235 NUCLEAR ANTIGEN ANTIBODY: CPT

## 2024-07-16 PROCEDURE — 36415 COLL VENOUS BLD VENIPUNCTURE: CPT

## 2024-07-16 PROCEDURE — 80053 COMPREHEN METABOLIC PANEL: CPT

## 2024-07-16 PROCEDURE — 86003 ALLG SPEC IGE CRUDE XTRC EA: CPT

## 2024-07-16 PROCEDURE — 83516 IMMUNOASSAY NONANTIBODY: CPT

## 2024-07-16 PROCEDURE — 86671 FUNGUS NES ANTIBODY: CPT

## 2024-07-16 PROCEDURE — 86334 IMMUNOFIX E-PHORESIS SERUM: CPT

## 2024-07-16 PROCEDURE — 86255 FLUORESCENT ANTIBODY SCREEN: CPT

## 2024-07-16 PROCEDURE — 82784 ASSAY IGA/IGD/IGG/IGM EACH: CPT

## 2024-07-16 PROCEDURE — 84165 PROTEIN E-PHORESIS SERUM: CPT

## 2024-07-16 PROCEDURE — 86225 DNA ANTIBODY NATIVE: CPT

## 2024-07-16 PROCEDURE — 82785 ASSAY OF IGE: CPT

## 2024-07-16 PROCEDURE — 85652 RBC SED RATE AUTOMATED: CPT

## 2024-07-16 PROCEDURE — 86036 ANCA SCREEN EACH ANTIBODY: CPT

## 2024-07-19 LAB
ACCA: 29 UNITS (ref 0–90)
ACTIN IGG SERPL-ACNC: 7 UNITS (ref 0–19)
ALBUMIN SERPL-SCNC: 3.4 G/DL (ref 2.9–4.4)
ALBUMIN: 3.4 G/DL (ref 2.9–4.4)
ALCA: 28 UNITS (ref 0–60)
AMCA: 17 UNITS (ref 0–100)
ANTI-CHROMATIN: <0.2 AI (ref 0–0.9)
ANTI-DSDNA  AB: <1 IU/ML (ref 0–9)
ANTI-JO: <0.2 AI (ref 0–0.9)
ANTI-SMOOTH MUSCLE ABS: 7 UNITS (ref 0–19)
C-ANCA SER-ACNC: (no result) TITER
CHITOBIOSIDE IGA SERPL IA-ACNC: 29 UNITS (ref 0–90)
DSDNA AB SER-ACNC: <1 IU/ML (ref 0–9)
ENA JO1 AB SER-ACNC: <0.2 AI (ref 0–0.9)
GAMMA GLOB SERPL ELPH-MCNC: 1.1 G/DL (ref 0.4–1.8)
GAMMA GLOBULIN: 1.1 G/DL (ref 0.4–1.8)
IGA SERPL-MCNC: 484 MG/DL (ref 87–352)
IGG SERPL-MCNC: 1050 MG/DL (ref 586–1602)
IGM SERPL-MCNC: 180 MG/DL (ref 26–217)
IMMUNOGLOBULIN A: 484 MG/DL (ref 87–352)
IMMUNOGLOBULIN G: 1050 MG/DL (ref 586–1602)
IMMUNOGLOBULIN M: 180 MG/DL (ref 26–217)
LAMINARIBIOSIDE IGG SERPL IA-ACNC: 28 UNITS (ref 0–60)
MANNOBIOSIDE IGG SERPL IA-ACNC: 17 UNITS (ref 0–100)
MUSSELS: <0.1 KU/L
P-ANCA TITR SER IF: (no result) TITER
PROEL- TOTAL PROTEIN: 6.9 G/DL (ref 6–8.5)
PROT SERPL-MCNC: 6.9 G/DL (ref 6–8.5)
SJOGREN'S ANTI-SS-A TEST: < 0.2 AI (ref 0–0.9)
SJOGREN'S ANTI-SS-B TEST: < 0.2 AI (ref 0–0.9)
WHOLE EGG IGE QN: <0.1 KU/L

## 2024-07-23 ENCOUNTER — HOSPITAL ENCOUNTER (OUTPATIENT)
Dept: HOSPITAL 100 - EN | Age: 64
Discharge: HOME | End: 2024-07-23
Payer: MEDICARE

## 2024-07-23 VITALS
HEART RATE: 77 BPM | SYSTOLIC BLOOD PRESSURE: 124 MMHG | RESPIRATION RATE: 16 BRPM | TEMPERATURE: 97.52 F | OXYGEN SATURATION: 97 % | DIASTOLIC BLOOD PRESSURE: 81 MMHG

## 2024-07-23 VITALS
HEART RATE: 96 BPM | DIASTOLIC BLOOD PRESSURE: 81 MMHG | SYSTOLIC BLOOD PRESSURE: 153 MMHG | OXYGEN SATURATION: 97 % | RESPIRATION RATE: 16 BRPM | TEMPERATURE: 97.34 F

## 2024-07-23 VITALS
RESPIRATION RATE: 16 BRPM | DIASTOLIC BLOOD PRESSURE: 68 MMHG | DIASTOLIC BLOOD PRESSURE: 68 MMHG | SYSTOLIC BLOOD PRESSURE: 153 MMHG | OXYGEN SATURATION: 100 % | HEART RATE: 78 BPM | RESPIRATION RATE: 16 BRPM | HEART RATE: 79 BPM | TEMPERATURE: 97.52 F | SYSTOLIC BLOOD PRESSURE: 112 MMHG | OXYGEN SATURATION: 97 % | TEMPERATURE: 97.52 F

## 2024-07-23 VITALS — BODY MASS INDEX: 34 KG/M2

## 2024-07-23 VITALS
OXYGEN SATURATION: 97 % | TEMPERATURE: 97.34 F | SYSTOLIC BLOOD PRESSURE: 153 MMHG | DIASTOLIC BLOOD PRESSURE: 81 MMHG | RESPIRATION RATE: 16 BRPM | HEART RATE: 96 BPM

## 2024-07-23 VITALS — SYSTOLIC BLOOD PRESSURE: 153 MMHG | DIASTOLIC BLOOD PRESSURE: 81 MMHG

## 2024-07-23 DIAGNOSIS — K62.3: ICD-10-CM

## 2024-07-23 DIAGNOSIS — K57.30: ICD-10-CM

## 2024-07-23 DIAGNOSIS — K21.9: ICD-10-CM

## 2024-07-23 DIAGNOSIS — Z79.899: ICD-10-CM

## 2024-07-23 DIAGNOSIS — Z79.84: ICD-10-CM

## 2024-07-23 DIAGNOSIS — K76.6: ICD-10-CM

## 2024-07-23 DIAGNOSIS — Z79.51: ICD-10-CM

## 2024-07-23 DIAGNOSIS — Z79.82: ICD-10-CM

## 2024-07-23 DIAGNOSIS — Z12.11: Primary | ICD-10-CM

## 2024-07-23 DIAGNOSIS — J44.9: ICD-10-CM

## 2024-07-23 DIAGNOSIS — K63.5: ICD-10-CM

## 2024-07-23 DIAGNOSIS — D64.9: ICD-10-CM

## 2024-07-23 DIAGNOSIS — I10: ICD-10-CM

## 2024-07-23 DIAGNOSIS — K29.70: ICD-10-CM

## 2024-07-23 DIAGNOSIS — E11.9: ICD-10-CM

## 2024-07-23 PROCEDURE — 45385 COLONOSCOPY W/LESION REMOVAL: CPT

## 2024-07-23 PROCEDURE — 82962 GLUCOSE BLOOD TEST: CPT

## 2024-07-23 PROCEDURE — 43239 EGD BIOPSY SINGLE/MULTIPLE: CPT

## 2024-07-23 PROCEDURE — 88305 TISSUE EXAM BY PATHOLOGIST: CPT

## 2024-07-23 PROCEDURE — 45380 COLONOSCOPY AND BIOPSY: CPT

## 2024-07-23 PROCEDURE — 88342 IMHCHEM/IMCYTCHM 1ST ANTB: CPT

## 2024-07-23 PROCEDURE — 43251 EGD REMOVE LESION SNARE: CPT

## 2024-07-23 PROCEDURE — 0DJD8ZZ INSPECTION OF LOWER INTESTINAL TRACT, VIA NATURAL OR ARTIFICIAL OPENING ENDOSCOPIC: ICD-10-PCS | Performed by: INTERNAL MEDICINE

## 2024-07-31 ENCOUNTER — HOSPITAL ENCOUNTER (OUTPATIENT)
Dept: HOSPITAL 100 - NM | Age: 64
Discharge: HOME | End: 2024-07-31
Payer: MEDICARE

## 2024-07-31 DIAGNOSIS — Z12.11: Primary | ICD-10-CM

## 2024-07-31 DIAGNOSIS — D64.9: ICD-10-CM

## 2024-07-31 DIAGNOSIS — R14.0: ICD-10-CM

## 2024-07-31 PROCEDURE — 78264 GASTRIC EMPTYING IMG STUDY: CPT

## 2024-07-31 PROCEDURE — A9541 TC99M SULFUR COLLOID: HCPCS

## 2024-08-05 ENCOUNTER — TELEPHONE (OUTPATIENT)
Age: 64
End: 2024-08-05

## 2024-08-05 ENCOUNTER — HOSPITAL ENCOUNTER (OUTPATIENT)
Age: 64
Discharge: HOME | End: 2024-08-05
Payer: MEDICARE

## 2024-08-05 ENCOUNTER — APPOINTMENT (OUTPATIENT)
Dept: PRIMARY CARE | Facility: CLINIC | Age: 64
End: 2024-08-05
Payer: MEDICARE

## 2024-08-05 VITALS
DIASTOLIC BLOOD PRESSURE: 80 MMHG | BODY MASS INDEX: 34.62 KG/M2 | WEIGHT: 220.6 LBS | HEIGHT: 67 IN | SYSTOLIC BLOOD PRESSURE: 120 MMHG | OXYGEN SATURATION: 97 % | HEART RATE: 76 BPM

## 2024-08-05 DIAGNOSIS — E11.9 TYPE 2 DIABETES MELLITUS WITHOUT COMPLICATION, UNSPECIFIED WHETHER LONG TERM INSULIN USE (MULTI): ICD-10-CM

## 2024-08-05 DIAGNOSIS — E53.8 B12 DEFICIENCY: ICD-10-CM

## 2024-08-05 DIAGNOSIS — K21.9 GASTROESOPHAGEAL REFLUX DISEASE, UNSPECIFIED WHETHER ESOPHAGITIS PRESENT: ICD-10-CM

## 2024-08-05 DIAGNOSIS — K74.60 HEPATIC CIRRHOSIS, UNSPECIFIED HEPATIC CIRRHOSIS TYPE, UNSPECIFIED WHETHER ASCITES PRESENT (MULTI): Primary | ICD-10-CM

## 2024-08-05 DIAGNOSIS — D64.9: ICD-10-CM

## 2024-08-05 DIAGNOSIS — R14.0: Primary | ICD-10-CM

## 2024-08-05 DIAGNOSIS — I47.10 PAROXYSMAL SVT (SUPRAVENTRICULAR TACHYCARDIA) (CMS-HCC): ICD-10-CM

## 2024-08-05 DIAGNOSIS — J43.2 CENTRILOBULAR EMPHYSEMA (MULTI): ICD-10-CM

## 2024-08-05 DIAGNOSIS — Z72.0 TOBACCO ABUSE: ICD-10-CM

## 2024-08-05 DIAGNOSIS — E87.6 HYPOKALEMIA: ICD-10-CM

## 2024-08-05 DIAGNOSIS — D64.9 ANEMIA, UNSPECIFIED TYPE: ICD-10-CM

## 2024-08-05 PROCEDURE — A9537 TC99M MEBROFENIN: HCPCS

## 2024-08-05 PROCEDURE — 78227 HEPATOBIL SYST IMAGE W/DRUG: CPT

## 2024-08-05 RX ORDER — DEXTROSE 4 G
TABLET,CHEWABLE ORAL
Qty: 1 EACH | Refills: 0 | Status: SHIPPED | OUTPATIENT
Start: 2024-08-05

## 2024-08-05 RX ORDER — SPIRONOLACTONE 25 MG/1
25 TABLET ORAL DAILY
Qty: 30 TABLET | Refills: 11 | Status: SHIPPED | OUTPATIENT
Start: 2024-08-05 | End: 2025-08-05

## 2024-08-05 RX ORDER — LANCETS
EACH MISCELLANEOUS
Qty: 100 EACH | Refills: 3 | Status: SHIPPED | OUTPATIENT
Start: 2024-08-05

## 2024-08-05 RX ORDER — PIOGLITAZONEHYDROCHLORIDE 15 MG/1
15 TABLET ORAL DAILY
Qty: 30 TABLET | Refills: 11 | Status: SHIPPED | OUTPATIENT
Start: 2024-08-05 | End: 2025-08-05

## 2024-08-05 RX ORDER — FUROSEMIDE 40 MG/1
40 TABLET ORAL
Qty: 180 TABLET | Refills: 3 | Status: SHIPPED | OUTPATIENT
Start: 2024-08-05 | End: 2025-08-05

## 2024-08-05 RX ORDER — METOPROLOL TARTRATE 25 MG/1
25 TABLET, FILM COATED ORAL 2 TIMES DAILY
Qty: 180 TABLET | Refills: 3 | Status: SHIPPED | OUTPATIENT
Start: 2024-08-05 | End: 2025-08-05

## 2024-08-05 RX ORDER — IBUPROFEN 200 MG
1 CAPSULE ORAL DAILY
Qty: 100 EACH | Refills: 3 | Status: SHIPPED | OUTPATIENT
Start: 2024-08-05

## 2024-08-05 ASSESSMENT — ENCOUNTER SYMPTOMS
ACTIVITY CHANGE: 0
DIARRHEA: 0
NAUSEA: 0
UNEXPECTED WEIGHT CHANGE: 0
LIGHT-HEADEDNESS: 0
PALPITATIONS: 0
CONSTIPATION: 0
TROUBLE SWALLOWING: 0
ABDOMINAL DISTENTION: 0
COUGH: 0
NUMBNESS: 0
DIFFICULTY URINATING: 0
APPETITE CHANGE: 0
WHEEZING: 0
VOMITING: 0
RHINORRHEA: 0
FATIGUE: 1
ADENOPATHY: 0
ABDOMINAL PAIN: 0
DIZZINESS: 0
ARTHRALGIAS: 0
HEADACHES: 0
SHORTNESS OF BREATH: 1

## 2024-08-05 NOTE — PROGRESS NOTES
"Subjective   Patient ID: Ana Melvin is a 64 y.o. female who presents for 1 MO CK.    HPI   Had EGD and colonoscopy done, seen GI in [place]  GI concerned about cirrhosis, had HIDA done today, had gastric emptying study done last week  No LBS issues, not using Lactulose, not constipated  Ankle swelling and mid section  + CORONA, some coughing at times  had iron infusion, started B12  Appetite not great, early satiety    Review of Systems   Constitutional:  Positive for fatigue. Negative for activity change, appetite change and unexpected weight change.   HENT:  Negative for ear pain, nosebleeds, rhinorrhea, sneezing and trouble swallowing.    Respiratory:  Positive for shortness of breath. Negative for cough and wheezing.    Cardiovascular:  Positive for leg swelling. Negative for chest pain and palpitations.   Gastrointestinal:  Negative for abdominal distention, abdominal pain, constipation, diarrhea, nausea and vomiting.   Genitourinary:  Negative for difficulty urinating.   Musculoskeletal:  Negative for arthralgias.   Skin:  Negative for rash.   Neurological:  Negative for dizziness, light-headedness, numbness and headaches.   Hematological:  Negative for adenopathy.   Psychiatric/Behavioral:  Negative for behavioral problems.    All other systems reviewed and are negative.      Objective   /80   Pulse 76   Ht 1.689 m (5' 6.5\")   Wt 100 kg (220 lb 9.6 oz)   SpO2 97%   BMI 35.07 kg/m²     Physical Exam  Vitals and nursing note reviewed.   Constitutional:       General: She is not in acute distress.     Appearance: Normal appearance. She is not toxic-appearing.   HENT:      Head: Normocephalic and atraumatic.      Right Ear: Tympanic membrane, ear canal and external ear normal.      Left Ear: Tympanic membrane, ear canal and external ear normal.      Nose: Nose normal.      Mouth/Throat:      Mouth: Mucous membranes are moist.      Pharynx: Oropharynx is clear.   Eyes:      Extraocular Movements: " Extraocular movements intact.      Conjunctiva/sclera: Conjunctivae normal.      Pupils: Pupils are equal, round, and reactive to light.   Cardiovascular:      Rate and Rhythm: Normal rate and regular rhythm.      Pulses: Normal pulses.      Heart sounds: Normal heart sounds.      Comments: +2 edema bilateral lower extremities to the knee.  Pulmonary:      Effort: Pulmonary effort is normal.      Breath sounds: Normal breath sounds.   Abdominal:      General: Abdomen is flat. Bowel sounds are normal.      Palpations: Abdomen is soft.   Musculoskeletal:      Cervical back: Normal range of motion and neck supple.   Skin:     General: Skin is warm and dry.      Capillary Refill: Capillary refill takes less than 2 seconds.   Neurological:      General: No focal deficit present.      Mental Status: She is alert and oriented to person, place, and time. Mental status is at baseline.   Psychiatric:         Mood and Affect: Mood normal.         Behavior: Behavior normal.         Assessment/Plan   Problem List Items Addressed This Visit             ICD-10-CM    COPD (chronic obstructive pulmonary disease) (Multi) J44.9     Encouraged to quit smoking, continue with current inhalers.         Relevant Orders    Follow Up In Primary Care - Established    Diabetes mellitus (Multi) E11.9     Will set up to check blood sugars in the morning before eating, patient advised goal is to be 140 or less if able, will try adding pioglitazone 15 mg once a day, may need to place on insulin to get sugar under better control.         Relevant Medications    blood-glucose meter misc    blood sugar diagnostic (Blood Glucose Test) strip    pioglitazone (Actos) 15 mg tablet    lancets misc    Other Relevant Orders    Follow Up In Primary Care - Established    GERD (gastroesophageal reflux disease) K21.9     Stable with PPI, post EGD that showed variceal changes.         Relevant Orders    Follow Up In Primary Care - Established    Paroxysmal SVT  (supraventricular tachycardia) (CMS-HCC) I47.10     Not aware of palpitations, blood pressure today seems to be stable.         Relevant Medications    metoprolol tartrate (Lopressor) 25 mg tablet    furosemide (Lasix) 40 mg tablet    Other Relevant Orders    Follow Up In Primary Care - Established    B12 deficiency E53.8    Relevant Orders    Follow Up In Primary Care - Established    Anemia D64.9     Had iron infusions done, most likely related to chronic disease and liver failure, will continue to follow         Relevant Orders    CBC and Auto Differential    Follow Up In Primary Care - Established    Hepatic cirrhosis (Multi) - Primary K74.60     Seeing gastroenterology in [place], EGD showed esophageal changes related to varices, patient under going workup for liver cirrhosis, planning to do liver biopsy, claims that blood testing suggests some type of autoimmune hepatitis, will increase Lasix to 40 mg twice daily, add spironolactone 25 mg once a day to try to reduce swelling and help with shortness of breath.  Follow-up in the next 2 weeks with blood testing, try to communicate with patient's gastroenterologist.         Relevant Medications    spironolactone (Aldactone) 25 mg tablet    Other Relevant Orders    CBC and Auto Differential    Comprehensive Metabolic Panel    Follow Up In Primary Care - Established     Other Visit Diagnoses         Codes    Hypokalemia     E87.6    Relevant Orders    Follow Up In Primary Care - Established    Tobacco abuse     Z72.0    Relevant Orders    Follow Up In Primary Care - Established

## 2024-08-05 NOTE — PATIENT INSTRUCTIONS
Checks blood sugar in AM, start pioglitazone once a day, increase furosemide to twice a day and start spironolactone. AM sugar goal less than 140, if over 200 consistently, call

## 2024-08-05 NOTE — ASSESSMENT & PLAN NOTE
Encouraged to quit smoking, continue with current inhalers.  
Had iron infusions done, most likely related to chronic disease and liver failure, will continue to follow  
Not aware of palpitations, blood pressure today seems to be stable.  
Seeing gastroenterology in [place], EGD showed esophageal changes related to varices, patient under going workup for liver cirrhosis, planning to do liver biopsy, claims that blood testing suggests some type of autoimmune hepatitis, will increase Lasix to 40 mg twice daily, add spironolactone 25 mg once a day to try to reduce swelling and help with shortness of breath.  Follow-up in the next 2 weeks with blood testing, try to communicate with patient's gastroenterologist.  
Stable with PPI, post EGD that showed variceal changes.  
Will set up to check blood sugars in the morning before eating, patient advised goal is to be 140 or less if able, will try adding pioglitazone 15 mg once a day, may need to place on insulin to get sugar under better control.  
age appropriate assistance

## 2024-08-05 NOTE — TELEPHONE ENCOUNTER
Faxed record request form to [facility] outpatient lab at [phone] per protocol, conf rec.

## 2024-08-06 ENCOUNTER — INFUSION (OUTPATIENT)
Dept: HEMATOLOGY/ONCOLOGY | Facility: CLINIC | Age: 64
End: 2024-08-06
Payer: MEDICARE

## 2024-08-06 VITALS
HEART RATE: 96 BPM | DIASTOLIC BLOOD PRESSURE: 76 MMHG | BODY MASS INDEX: 35.33 KG/M2 | RESPIRATION RATE: 18 BRPM | SYSTOLIC BLOOD PRESSURE: 153 MMHG | OXYGEN SATURATION: 96 % | TEMPERATURE: 97.3 F | WEIGHT: 222.22 LBS

## 2024-08-06 DIAGNOSIS — D50.8 OTHER IRON DEFICIENCY ANEMIA: ICD-10-CM

## 2024-08-06 DIAGNOSIS — D64.9 ANEMIA, UNSPECIFIED TYPE: ICD-10-CM

## 2024-08-06 DIAGNOSIS — E53.8 B12 DEFICIENCY: ICD-10-CM

## 2024-08-06 PROCEDURE — 2500000004 HC RX 250 GENERAL PHARMACY W/ HCPCS (ALT 636 FOR OP/ED)

## 2024-08-06 PROCEDURE — 96372 THER/PROPH/DIAG INJ SC/IM: CPT

## 2024-08-06 RX ORDER — CYANOCOBALAMIN 1000 UG/ML
1000 INJECTION, SOLUTION INTRAMUSCULAR; SUBCUTANEOUS ONCE
OUTPATIENT
Start: 2024-09-03

## 2024-08-06 RX ORDER — DIPHENHYDRAMINE HYDROCHLORIDE 50 MG/ML
50 INJECTION INTRAMUSCULAR; INTRAVENOUS AS NEEDED
OUTPATIENT
Start: 2024-09-03

## 2024-08-06 RX ORDER — ALBUTEROL SULFATE 0.83 MG/ML
3 SOLUTION RESPIRATORY (INHALATION) AS NEEDED
OUTPATIENT
Start: 2024-09-03

## 2024-08-06 RX ORDER — FAMOTIDINE 10 MG/ML
20 INJECTION INTRAVENOUS ONCE AS NEEDED
OUTPATIENT
Start: 2024-09-03

## 2024-08-06 RX ORDER — CYANOCOBALAMIN 1000 UG/ML
1000 INJECTION, SOLUTION INTRAMUSCULAR; SUBCUTANEOUS ONCE
Status: COMPLETED | OUTPATIENT
Start: 2024-08-06 | End: 2024-08-06

## 2024-08-06 RX ORDER — EPINEPHRINE 0.3 MG/.3ML
0.3 INJECTION SUBCUTANEOUS EVERY 5 MIN PRN
OUTPATIENT
Start: 2024-09-03

## 2024-08-06 ASSESSMENT — PAIN SCALES - GENERAL: PAINLEVEL: 4

## 2024-08-16 ENCOUNTER — LAB (OUTPATIENT)
Dept: LAB | Facility: LAB | Age: 64
End: 2024-08-16
Payer: MEDICARE

## 2024-08-16 DIAGNOSIS — K74.60 HEPATIC CIRRHOSIS, UNSPECIFIED HEPATIC CIRRHOSIS TYPE, UNSPECIFIED WHETHER ASCITES PRESENT (MULTI): ICD-10-CM

## 2024-08-16 DIAGNOSIS — D64.9 ANEMIA, UNSPECIFIED TYPE: ICD-10-CM

## 2024-08-16 LAB
ALBUMIN SERPL BCP-MCNC: 4 G/DL (ref 3.4–5)
ALP SERPL-CCNC: 100 U/L (ref 33–136)
ALT SERPL W P-5'-P-CCNC: 17 U/L (ref 7–45)
ANION GAP SERPL CALC-SCNC: 13 MMOL/L (ref 10–20)
AST SERPL W P-5'-P-CCNC: 24 U/L (ref 9–39)
BASOPHILS # BLD AUTO: 0.07 X10*3/UL (ref 0–0.1)
BASOPHILS NFR BLD AUTO: 0.9 %
BILIRUB SERPL-MCNC: 0.5 MG/DL (ref 0–1.2)
BUN SERPL-MCNC: 11 MG/DL (ref 6–23)
CALCIUM SERPL-MCNC: 10.7 MG/DL (ref 8.6–10.3)
CHLORIDE SERPL-SCNC: 100 MMOL/L (ref 98–107)
CO2 SERPL-SCNC: 29 MMOL/L (ref 21–32)
CREAT SERPL-MCNC: 1.03 MG/DL (ref 0.5–1.05)
EGFRCR SERPLBLD CKD-EPI 2021: 60 ML/MIN/1.73M*2
EOSINOPHIL # BLD AUTO: 0.26 X10*3/UL (ref 0–0.7)
EOSINOPHIL NFR BLD AUTO: 3.3 %
ERYTHROCYTE [DISTWIDTH] IN BLOOD BY AUTOMATED COUNT: 26.5 % (ref 11.5–14.5)
GLUCOSE SERPL-MCNC: 117 MG/DL (ref 74–99)
HCT VFR BLD AUTO: 42 % (ref 36–46)
HGB BLD-MCNC: 12.2 G/DL (ref 12–16)
IMM GRANULOCYTES # BLD AUTO: 0.03 X10*3/UL (ref 0–0.7)
IMM GRANULOCYTES NFR BLD AUTO: 0.4 % (ref 0–0.9)
LYMPHOCYTES # BLD AUTO: 1.89 X10*3/UL (ref 1.2–4.8)
LYMPHOCYTES NFR BLD AUTO: 24 %
MCH RBC QN AUTO: 23.1 PG (ref 26–34)
MCHC RBC AUTO-ENTMCNC: 29 G/DL (ref 32–36)
MCV RBC AUTO: 79 FL (ref 80–100)
MONOCYTES # BLD AUTO: 0.54 X10*3/UL (ref 0.1–1)
MONOCYTES NFR BLD AUTO: 6.9 %
NEUTROPHILS # BLD AUTO: 5.08 X10*3/UL (ref 1.2–7.7)
NEUTROPHILS NFR BLD AUTO: 64.5 %
NRBC BLD-RTO: 0 /100 WBCS (ref 0–0)
PLATELET # BLD AUTO: 245 X10*3/UL (ref 150–450)
POTASSIUM SERPL-SCNC: 4.5 MMOL/L (ref 3.5–5.3)
PROT SERPL-MCNC: 7.1 G/DL (ref 6.4–8.2)
RBC # BLD AUTO: 5.29 X10*6/UL (ref 4–5.2)
RBC MORPH BLD: NORMAL
SODIUM SERPL-SCNC: 137 MMOL/L (ref 136–145)
WBC # BLD AUTO: 7.9 X10*3/UL (ref 4.4–11.3)

## 2024-08-16 PROCEDURE — 80053 COMPREHEN METABOLIC PANEL: CPT

## 2024-08-16 PROCEDURE — 85025 COMPLETE CBC W/AUTO DIFF WBC: CPT

## 2024-08-16 PROCEDURE — 36415 COLL VENOUS BLD VENIPUNCTURE: CPT

## 2024-08-20 ENCOUNTER — APPOINTMENT (OUTPATIENT)
Age: 64
End: 2024-08-20
Payer: MEDICARE

## 2024-08-20 VITALS
OXYGEN SATURATION: 98 % | WEIGHT: 213.8 LBS | HEIGHT: 67 IN | BODY MASS INDEX: 33.56 KG/M2 | HEART RATE: 81 BPM | DIASTOLIC BLOOD PRESSURE: 70 MMHG | SYSTOLIC BLOOD PRESSURE: 130 MMHG

## 2024-08-20 DIAGNOSIS — E87.6 HYPOKALEMIA: ICD-10-CM

## 2024-08-20 DIAGNOSIS — K21.9 GASTROESOPHAGEAL REFLUX DISEASE, UNSPECIFIED WHETHER ESOPHAGITIS PRESENT: ICD-10-CM

## 2024-08-20 DIAGNOSIS — D64.9 ANEMIA, UNSPECIFIED TYPE: ICD-10-CM

## 2024-08-20 DIAGNOSIS — E53.8 B12 DEFICIENCY: ICD-10-CM

## 2024-08-20 DIAGNOSIS — J43.2 CENTRILOBULAR EMPHYSEMA (MULTI): ICD-10-CM

## 2024-08-20 DIAGNOSIS — E11.9 TYPE 2 DIABETES MELLITUS WITHOUT COMPLICATION, UNSPECIFIED WHETHER LONG TERM INSULIN USE (MULTI): ICD-10-CM

## 2024-08-20 DIAGNOSIS — K74.60 HEPATIC CIRRHOSIS, UNSPECIFIED HEPATIC CIRRHOSIS TYPE, UNSPECIFIED WHETHER ASCITES PRESENT (MULTI): ICD-10-CM

## 2024-08-20 DIAGNOSIS — I47.10 PAROXYSMAL SVT (SUPRAVENTRICULAR TACHYCARDIA) (CMS-HCC): ICD-10-CM

## 2024-08-20 DIAGNOSIS — Z72.0 TOBACCO ABUSE: ICD-10-CM

## 2024-08-20 PROCEDURE — 1159F MED LIST DOCD IN RCRD: CPT | Performed by: FAMILY MEDICINE

## 2024-08-20 PROCEDURE — 3008F BODY MASS INDEX DOCD: CPT | Performed by: FAMILY MEDICINE

## 2024-08-20 PROCEDURE — 1160F RVW MEDS BY RX/DR IN RCRD: CPT | Performed by: FAMILY MEDICINE

## 2024-08-20 PROCEDURE — 3050F LDL-C >= 130 MG/DL: CPT | Performed by: FAMILY MEDICINE

## 2024-08-20 PROCEDURE — 3075F SYST BP GE 130 - 139MM HG: CPT | Performed by: FAMILY MEDICINE

## 2024-08-20 PROCEDURE — 3052F HG A1C>EQUAL 8.0%<EQUAL 9.0%: CPT | Performed by: FAMILY MEDICINE

## 2024-08-20 PROCEDURE — 3078F DIAST BP <80 MM HG: CPT | Performed by: FAMILY MEDICINE

## 2024-08-20 PROCEDURE — 99214 OFFICE O/P EST MOD 30 MIN: CPT | Performed by: FAMILY MEDICINE

## 2024-08-20 ASSESSMENT — ENCOUNTER SYMPTOMS
ADENOPATHY: 0
COUGH: 0
VOMITING: 0
WHEEZING: 0
DIARRHEA: 0
ABDOMINAL PAIN: 0
NAUSEA: 0
ABDOMINAL DISTENTION: 0
RHINORRHEA: 0
NUMBNESS: 0
ARTHRALGIAS: 0
PALPITATIONS: 0
ACTIVITY CHANGE: 0
DIFFICULTY URINATING: 0
DIZZINESS: 0
CONSTIPATION: 0
HEADACHES: 0
FATIGUE: 0
LIGHT-HEADEDNESS: 0
TROUBLE SWALLOWING: 0
APPETITE CHANGE: 0
UNEXPECTED WEIGHT CHANGE: 0
SHORTNESS OF BREATH: 1

## 2024-08-20 NOTE — PROGRESS NOTES
"Subjective   Patient ID: Ana Melvin is a 64 y.o. female who presents for 2 WK LABS.    HPI   Swelling better, to see GI again in the next month, to see surgery for GB surgery, HIDA showed a non-functioning GB, to have a liver biopsy  BS below 130, less CORONA/SOB, leg cramps better/less burning in feet, less shoulder pain  Weight down 10 pounds    Review of Systems   Constitutional:  Negative for activity change, appetite change, fatigue and unexpected weight change.   HENT:  Negative for ear pain, nosebleeds, rhinorrhea, sneezing and trouble swallowing.    Respiratory:  Positive for shortness of breath. Negative for cough and wheezing.    Cardiovascular:  Negative for chest pain, palpitations and leg swelling.   Gastrointestinal:  Negative for abdominal distention, abdominal pain, constipation, diarrhea, nausea and vomiting.   Genitourinary:  Negative for difficulty urinating.   Musculoskeletal:  Negative for arthralgias.   Skin:  Negative for rash.   Neurological:  Negative for dizziness, light-headedness, numbness and headaches.   Hematological:  Negative for adenopathy.   Psychiatric/Behavioral:  Negative for behavioral problems.    All other systems reviewed and are negative.      Objective   /70   Pulse 81   Ht 1.689 m (5' 6.5\")   Wt 97 kg (213 lb 12.8 oz)   SpO2 98%   BMI 33.99 kg/m²     Physical Exam  Vitals and nursing note reviewed.   Constitutional:       Appearance: Normal appearance.   HENT:      Head: Normocephalic and atraumatic.      Right Ear: Tympanic membrane, ear canal and external ear normal.      Left Ear: Tympanic membrane, ear canal and external ear normal.      Nose: Nose normal.      Mouth/Throat:      Mouth: Mucous membranes are moist.      Pharynx: Oropharynx is clear.   Cardiovascular:      Rate and Rhythm: Normal rate and regular rhythm.      Pulses: Normal pulses.      Heart sounds: Normal heart sounds.   Pulmonary:      Effort: Pulmonary effort is normal.      Breath sounds: " Normal breath sounds.   Abdominal:      General: Bowel sounds are normal. There is distension.      Palpations: Abdomen is soft. There is no mass.      Tenderness: There is no abdominal tenderness. There is no guarding or rebound.   Musculoskeletal:      Cervical back: Normal range of motion and neck supple.   Skin:     General: Skin is warm and dry.   Neurological:      Mental Status: She is alert.   Psychiatric:         Mood and Affect: Mood normal.         Behavior: Behavior normal.         Assessment/Plan   Problem List Items Addressed This Visit             ICD-10-CM    COPD (chronic obstructive pulmonary disease) (Multi) J44.9     Encouraged to quit smoking, continue with current treatment.         Relevant Orders    Follow Up In Primary Care - Established    Diabetes mellitus (Multi) E11.9     Will recheck in 1 month with A1c testing.  Continue with metformin and glimepiride, possible rash from pioglitazone but may have also been due to swelling in the legs.  May need insulin to control sugar.         Relevant Orders    Follow Up In Primary Care - Established    Comprehensive Metabolic Panel    Hemoglobin A1C    Lipid Panel    GERD (gastroesophageal reflux disease) K21.9    Relevant Orders    Follow Up In Primary Care - Established    Paroxysmal SVT (supraventricular tachycardia) (CMS-HCC) I47.10    Relevant Orders    Follow Up In Primary Care - Established    B12 deficiency E53.8    Relevant Orders    Follow Up In Primary Care - Established    Anemia D64.9     Hemoglobin improved with iron infusion.         Relevant Orders    Follow Up In Primary Care - Established    CBC and Auto Differential    Comprehensive Metabolic Panel    Hepatic cirrhosis (Multi) K74.60     Follow-up with GI, supposed also consult with general surgery for possible gallbladder surgery and liver biopsy.         Relevant Orders    Follow Up In Primary Care - Established     Other Visit Diagnoses         Codes    Hypokalemia     E87.6     Relevant Orders    Follow Up In Primary Care - Established    Tobacco abuse     Z72.0    Relevant Orders    Follow Up In Primary Care - Established

## 2024-08-20 NOTE — ASSESSMENT & PLAN NOTE
Will recheck in 1 month with A1c testing.  Continue with metformin and glimepiride, possible rash from pioglitazone but may have also been due to swelling in the legs.  May need insulin to control sugar.

## 2024-08-20 NOTE — ASSESSMENT & PLAN NOTE
Follow-up with GI, supposed also consult with general surgery for possible gallbladder surgery and liver biopsy.

## 2024-08-30 ENCOUNTER — LAB (OUTPATIENT)
Dept: LAB | Facility: LAB | Age: 64
End: 2024-08-30
Payer: MEDICARE

## 2024-08-30 DIAGNOSIS — E61.1 IRON DEFICIENCY: ICD-10-CM

## 2024-08-30 LAB
ALBUMIN SERPL BCP-MCNC: 4.1 G/DL (ref 3.4–5)
ALP SERPL-CCNC: 100 U/L (ref 33–136)
ALT SERPL W P-5'-P-CCNC: 17 U/L (ref 7–45)
ANION GAP SERPL CALC-SCNC: 12 MMOL/L (ref 10–20)
AST SERPL W P-5'-P-CCNC: 22 U/L (ref 9–39)
BASOPHILS # BLD AUTO: 0.06 X10*3/UL (ref 0–0.1)
BASOPHILS NFR BLD AUTO: 0.7 %
BILIRUB SERPL-MCNC: 0.5 MG/DL (ref 0–1.2)
BUN SERPL-MCNC: 14 MG/DL (ref 6–23)
CALCIUM SERPL-MCNC: 10.8 MG/DL (ref 8.6–10.3)
CHLORIDE SERPL-SCNC: 99 MMOL/L (ref 98–107)
CO2 SERPL-SCNC: 27 MMOL/L (ref 21–32)
CREAT SERPL-MCNC: 0.94 MG/DL (ref 0.5–1.05)
EGFRCR SERPLBLD CKD-EPI 2021: 67 ML/MIN/1.73M*2
EOSINOPHIL # BLD AUTO: 0.26 X10*3/UL (ref 0–0.7)
EOSINOPHIL NFR BLD AUTO: 3 %
ERYTHROCYTE [DISTWIDTH] IN BLOOD BY AUTOMATED COUNT: 23.7 % (ref 11.5–14.5)
FERRITIN SERPL-MCNC: 66 NG/ML (ref 8–150)
GLUCOSE SERPL-MCNC: 122 MG/DL (ref 74–99)
HCT VFR BLD AUTO: 41.7 % (ref 36–46)
HGB BLD-MCNC: 12.6 G/DL (ref 12–16)
IMM GRANULOCYTES # BLD AUTO: 0.02 X10*3/UL (ref 0–0.7)
IMM GRANULOCYTES NFR BLD AUTO: 0.2 % (ref 0–0.9)
IRON SATN MFR SERPL: 11 % (ref 25–45)
IRON SERPL-MCNC: 44 UG/DL (ref 35–150)
LYMPHOCYTES # BLD AUTO: 2.38 X10*3/UL (ref 1.2–4.8)
LYMPHOCYTES NFR BLD AUTO: 27 %
MCH RBC QN AUTO: 23.5 PG (ref 26–34)
MCHC RBC AUTO-ENTMCNC: 30.2 G/DL (ref 32–36)
MCV RBC AUTO: 78 FL (ref 80–100)
MONOCYTES # BLD AUTO: 0.57 X10*3/UL (ref 0.1–1)
MONOCYTES NFR BLD AUTO: 6.5 %
NEUTROPHILS # BLD AUTO: 5.52 X10*3/UL (ref 1.2–7.7)
NEUTROPHILS NFR BLD AUTO: 62.6 %
NRBC BLD-RTO: 0 /100 WBCS (ref 0–0)
PLATELET # BLD AUTO: 244 X10*3/UL (ref 150–450)
POTASSIUM SERPL-SCNC: 4.3 MMOL/L (ref 3.5–5.3)
PROT SERPL-MCNC: 7.5 G/DL (ref 6.4–8.2)
RBC # BLD AUTO: 5.37 X10*6/UL (ref 4–5.2)
RBC MORPH BLD: NORMAL
SCHISTOCYTES BLD QL SMEAR: NORMAL
SODIUM SERPL-SCNC: 134 MMOL/L (ref 136–145)
TIBC SERPL-MCNC: 415 UG/DL (ref 240–445)
UIBC SERPL-MCNC: 371 UG/DL (ref 110–370)
VIT B12 SERPL-MCNC: 357 PG/ML (ref 211–911)
WBC # BLD AUTO: 8.8 X10*3/UL (ref 4.4–11.3)

## 2024-08-30 PROCEDURE — 85025 COMPLETE CBC W/AUTO DIFF WBC: CPT

## 2024-08-30 PROCEDURE — 36415 COLL VENOUS BLD VENIPUNCTURE: CPT

## 2024-08-30 PROCEDURE — 83540 ASSAY OF IRON: CPT

## 2024-08-30 PROCEDURE — 83550 IRON BINDING TEST: CPT

## 2024-08-30 PROCEDURE — 82607 VITAMIN B-12: CPT

## 2024-08-30 PROCEDURE — 82728 ASSAY OF FERRITIN: CPT

## 2024-08-30 PROCEDURE — 80053 COMPREHEN METABOLIC PANEL: CPT

## 2024-08-30 NOTE — PROGRESS NOTES
Patient ID: Ana Melvin is a 64 y.o. female.    Subjective    HPI  Patient ID: Ana Melvin is a 64 y.o. female.  Referring Physician: Hasmukh Stewart MD  [address] [place] [address]  Visit Type: Initial Visit     Diagnosis/Reason: Anemia      HPI:  Patient is a 63 yo female with a PMH of COPD, GERD, Paroxysmal SVT, B12  and was referred to benign hematology for consultation of anemia.     Chronic abdominal pain for the past 2 years. Is scheduled to see a GI in July, to schedule for colonoscopy/ EGD. Patient reports she has been dealing with poor dentition, recent had tooth extraction, possible tooth infection completed course of antibiotics.       Today, patient presents for initial consultation. Energy is poor, fatigued very easily. CORONA with any exertion. Bilateral edema in LLE, has been increased (feet, ankles, legs) Left arm to shoulder pain. Severe right hip. Eating less than she use to, consisently feels full, even with out eating. 10-20lbs weight loss over a year. Loose stool with lots of bile. Denies fever, chills. Bruises easily, denies any abnormal bleeding. No recurrent infections or lymphadenopathy. No bone pain. No known blood disorders in family. Has had surgery in past w/o issue.   Episodes of light headed/dizziness. Nails are dry, brittle, hair loss, texture changes.      9/3/24:   Remains to have early satiety and stomach fullness and discomfort/pain. Poor appetite, states she eats a turkey sandwich daily, however most days is unable to finish, at times small amounts of water consumption make her feel extremely full. Energy remains poor, she just pushes herself to complete tasks.     Recently diagnosed with primary bilary cirrhosis, by Dr. Holder, however unaware the severity and treatment. She was prescribed spirolactone, which does seem to be helping minimally with ascites. She is scheduled to see a surgeon to hoping to discuss liver biopsy, cholecystectomy, umbilical hernia  repair, and evaluation/treatment of her ascites, by removing some fluid.     EGD- reported normal esophagus, portal hypertensive gastropathy- biopsied, a single gastroesophageal junction polyp was biopsied   Colonoscopy reported- rectal prolapse, diverticulosis in the recto-sigmoid colon, in the sigmoid colon and the descending colon, 14 mm polyp in the ascending colon, congested mucosa in recto-sigmoid colon, surveillance in 5 years.       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[employer],   PSH:  Breast Augmentation,   FH:  Half sister- Multiple Myeloma- (same mother)-  dx-2010  Father- Bladder Cancer  M-Grandmother- Stomach Cancer     Meds: see list      Menstrual Cycle History:  Post Menopausal, denies any vaginal bleeding   No MMG 10+ years and pap  No Skin Cancer Screening      Previous Hematological Background:  Hx of hematological disorders: No - Patient denies prior hematologic history  Hx of blood transfusions: No - Patient denies prior blood transfusion  Hx of iron supplementation: No - Denies any previous supplementation  Hx of B12 supplementation: Yes - Prior oral supplementation - Denies adverse effect and reaction  Hx of folate supplementation: No - Denies any prior supplementation     Relevant medications:  Currently using NSAID's (Naproxen, Ibuprofen etc.):            PMHx:       Active Ambulatory Problems     Diagnosis Date Noted    COPD (chronic obstructive pulmonary disease) (Multi) 04/12/2023    Diabetes mellitus (Multi) 04/12/2023    GERD (gastroesophageal reflux disease) 04/12/2023    Paroxysmal SVT (supraventricular tachycardia) (CMS-HCC) 04/12/2023    B12 deficiency 05/22/2023    Anemia 05/22/2023    Allergies 05/25/2023    Hematuria 05/25/2023     Low back pain 05/25/2023    Lung nodule 05/25/2023    Nocturia 05/25/2023    Urinary frequency 05/25/2023           Resolved Ambulatory Problems     Diagnosis Date Noted    Epigastric abdominal pain of unknown etiology 04/12/2023    Cough 05/25/2023    COVID-19 05/25/2023    Extreme obesity 05/25/2023    Pleurisy 05/25/2023    Vaginitis due to Candida albicans 05/25/2023           Past Medical History:   Diagnosis Date    Personal history of other medical treatment      Personal history of other medical treatment           PSHx:  Surgical History         Past Surgical History:   Procedure Laterality Date    OTHER SURGICAL HISTORY   02/24/2020     [place] tooth extraction    OTHER SURGICAL HISTORY   02/24/2020     Breast augmentation    OTHER SURGICAL HISTORY   02/24/2020     Cervical biopsy            FHx:  Family History          Family History   Problem Relation Name Age of Onset    Other (CVA) Mother        Coronary artery disease Mother        Heart attack Father        Other (BLADDER CANCER) Father        Multiple myeloma Sister        Heart attack Brother        Kidney failure Brother                   Social Hx:  Ana Melvin    reports that she has been smoking cigarettes. She has never used smokeless tobacco.  She  reports that she does not currently use alcohol.  She Drug use questions deferred to the physician.  Social History   Social History            Socioeconomic History    Marital status:        Spouse name: Not on file    Number of children: Not on file    Years of education: Not on file    Highest education level: Not on file   Occupational History    Not on file   Tobacco Use    Smoking status: Every Day       Types: Cigarettes    Smokeless tobacco: Never   Vaping Use    Vaping status: Never Used   Substance and Sexual Activity    Alcohol use: Not Currently    Drug use: Defer    Sexual activity: Defer   Other Topics Concern    Not on file   Social History Narrative    Not on file       Social Determinants of Health      Financial Resource Strain: Not on file   Food Insecurity: Not on file   Transportation Needs: Not on file   Physical Activity: Not on file   Stress: Not on file   Social Connections: Not on file   Intimate Partner Violence: Not on file   Housing Stability: Not on file             Medications and allergies reviewed in EMR.    Objective    BSA: There is no height or weight on file to calculate BSA.  There were no vitals taken for this visit.     Physical Exam  Vitals and nursing note reviewed.   Constitutional:       Appearance: Normal appearance.   HENT:      Head: Normocephalic and atraumatic.      Nose: Nose normal.      Mouth/Throat:      Mouth: Mucous membranes are moist.      Pharynx: Oropharynx is clear.   Eyes:      General: No scleral icterus.     Extraocular Movements: Extraocular movements intact.      Conjunctiva/sclera: Conjunctivae normal.   Cardiovascular:      Rate and Rhythm: Normal rate and regular rhythm.      Pulses: Normal pulses.      Heart sounds: Normal heart sounds.   Pulmonary:      Effort: Pulmonary effort is normal.      Breath sounds: Normal breath sounds.   Abdominal:      General: Bowel sounds are normal. There is distension.      Palpations: Abdomen is soft. There is no mass.      Tenderness: There is abdominal tenderness.   Musculoskeletal:         General: Swelling present. Normal range of motion.      Cervical back: Normal range of motion.   Skin:     General: Skin is warm and dry.      Coloration: Skin is jaundiced.      Findings: Bruising present.   Neurological:      General: No focal deficit present.      Mental Status: She is alert and oriented to person, place, and time.      Motor: Weakness present.   Psychiatric:         Mood and Affect: Mood normal.         Behavior: Behavior normal.         Thought Content: Thought content normal.         Judgment: Judgment normal.         Performance Status:  Symptomatic; fully  ambulatory      Assessment/Plan      Anemia  According to our records patient has been anemic since 2022 with a Hg ranging from 9.9-13.3, remainder of major bloodlines have been stable. Iron parameters were reported low in 6/3/24: Fe 16, Fe sat 4%, Ferritin 14, B12 226. Patient has been dealing with dental infections, was treated previously with antibiotics, not currently taking any antibiotics. Denies any abnormal bleeding, bruises easily. Multiple arthralgias/myalgias. Poor energy. Shortness of breath with activity/exertion. Eating less than normal, early satiety occasional. Weight loss of 10-20lbs over a year time frame. Denies any B symptoms.       Discussed oral vs IV replacement of Iron. Recommended IV iron as patient has GI issues currently and is significantly iron deficient with a Fe sat of 4%. Will plan for course of IV iron to administered. Patient is B12 deficient will order administration of IM B12 weekly x 4. Will continue to monitor patients labs closely.      Discussed possible etiologies including multifactorial, nutritional deficiencies, anemia of chronic disease, malabsorption, hemopathy, medications, bleeding, malignancy, etc. Will start hematological workup today.     9/3/24:   Patient is in office post infusion treatment completed 7/9/24. Labs show slight improvement- Hg 12.6, Fe 44, Fe Sat 11%, Ferritin 66, B12 357. Discussed anemia is likely multifactorial liver cirrhosis, inflammatory processes, malnutrition/malabsorption. Discussed starting oral iron every other day, if able to tolerate given her abdominal fullness/pain.      Encouraged patient to make follow-up appt with GI to discus evaluation/treatment of bilary cirrhosis. Patient is scheduled for consultation with surgeon to discuss cholecystomy, umbilical hernia repair, liver biopsy and ascites treatment. Encouraged patient to keep our office posted, in the event pt is unable to proceed with treatment, discussed consideration of repeat  CT scan and MRI to determine extent of liver lesion and possible biopsy, consideration for referral for paraesthesias for comfort measures, discussed the risk factors associated with procedure.     Regardless will continue to monitor patient and labs closely.          2. Atypical Mole:   Patient has a mole on her chest in which patient verbalized concerns due recent changes in appearance. Patient denies have a skin cancer screening performed in her life time. Discussed referral to dermatology for skin cancer screening and evaluation of atypical mole on her chest.      I had an extensive discussion with the patient regarding the diagnosis and discussed the plan of therapy, including general considerations regarding side effects and outcomes. Pt understood and gave appropriate teach back about the plan of care. All questions were answered to the patient's satisfaction. The patient is instructed to contact us at any time if questions or problems arise. Thank you for the opportunity to participate in the care of this very pleasant patient.     Total time = 30 minutes. 50% or more of this time was spent in counseling and/or coordination of care including reviewing medical history/radiology/labs, examining patient, formulating outlined plan with team, and discussing plan with patient/family.            Michelle Israel, APRN-CNP

## 2024-09-03 ENCOUNTER — OFFICE VISIT (OUTPATIENT)
Dept: HEMATOLOGY/ONCOLOGY | Facility: CLINIC | Age: 64
End: 2024-09-03
Payer: MEDICARE

## 2024-09-03 ENCOUNTER — INFUSION (OUTPATIENT)
Dept: HEMATOLOGY/ONCOLOGY | Facility: CLINIC | Age: 64
End: 2024-09-03
Payer: MEDICARE

## 2024-09-03 VITALS
SYSTOLIC BLOOD PRESSURE: 144 MMHG | HEART RATE: 77 BPM | DIASTOLIC BLOOD PRESSURE: 85 MMHG | WEIGHT: 214 LBS | TEMPERATURE: 96.3 F | OXYGEN SATURATION: 99 % | BODY MASS INDEX: 34.02 KG/M2 | RESPIRATION RATE: 16 BRPM

## 2024-09-03 DIAGNOSIS — E61.1 IRON DEFICIENCY: ICD-10-CM

## 2024-09-03 DIAGNOSIS — R18.8 CIRRHOSIS OF LIVER WITH ASCITES, UNSPECIFIED HEPATIC CIRRHOSIS TYPE (MULTI): Primary | ICD-10-CM

## 2024-09-03 DIAGNOSIS — D64.9 ANEMIA, UNSPECIFIED TYPE: ICD-10-CM

## 2024-09-03 DIAGNOSIS — R91.1 LUNG NODULE: ICD-10-CM

## 2024-09-03 DIAGNOSIS — E53.8 B12 DEFICIENCY: ICD-10-CM

## 2024-09-03 DIAGNOSIS — K74.60 CIRRHOSIS OF LIVER WITH ASCITES, UNSPECIFIED HEPATIC CIRRHOSIS TYPE (MULTI): Primary | ICD-10-CM

## 2024-09-03 PROCEDURE — 3077F SYST BP >= 140 MM HG: CPT

## 2024-09-03 PROCEDURE — 1126F AMNT PAIN NOTED NONE PRSNT: CPT

## 2024-09-03 PROCEDURE — 99214 OFFICE O/P EST MOD 30 MIN: CPT

## 2024-09-03 PROCEDURE — 2500000004 HC RX 250 GENERAL PHARMACY W/ HCPCS (ALT 636 FOR OP/ED)

## 2024-09-03 PROCEDURE — 3079F DIAST BP 80-89 MM HG: CPT

## 2024-09-03 PROCEDURE — 96372 THER/PROPH/DIAG INJ SC/IM: CPT

## 2024-09-03 PROCEDURE — 3052F HG A1C>EQUAL 8.0%<EQUAL 9.0%: CPT

## 2024-09-03 PROCEDURE — 1159F MED LIST DOCD IN RCRD: CPT

## 2024-09-03 PROCEDURE — 3050F LDL-C >= 130 MG/DL: CPT

## 2024-09-03 RX ORDER — FAMOTIDINE 10 MG/ML
20 INJECTION INTRAVENOUS ONCE AS NEEDED
OUTPATIENT
Start: 2024-10-01

## 2024-09-03 RX ORDER — CYANOCOBALAMIN 1000 UG/ML
1000 INJECTION, SOLUTION INTRAMUSCULAR; SUBCUTANEOUS ONCE
Status: COMPLETED | OUTPATIENT
Start: 2024-09-03 | End: 2024-09-03

## 2024-09-03 RX ORDER — CYANOCOBALAMIN 1000 UG/ML
1000 INJECTION, SOLUTION INTRAMUSCULAR; SUBCUTANEOUS ONCE
OUTPATIENT
Start: 2024-10-01

## 2024-09-03 RX ORDER — ALBUTEROL SULFATE 0.83 MG/ML
3 SOLUTION RESPIRATORY (INHALATION) AS NEEDED
OUTPATIENT
Start: 2024-10-01

## 2024-09-03 RX ORDER — CYANOCOBALAMIN 1000 UG/ML
INJECTION, SOLUTION INTRAMUSCULAR; SUBCUTANEOUS
Status: COMPLETED
Start: 2024-09-03 | End: 2024-09-03

## 2024-09-03 RX ORDER — DIPHENHYDRAMINE HYDROCHLORIDE 50 MG/ML
50 INJECTION INTRAMUSCULAR; INTRAVENOUS AS NEEDED
OUTPATIENT
Start: 2024-10-01

## 2024-09-03 RX ORDER — EPINEPHRINE 0.3 MG/.3ML
0.3 INJECTION SUBCUTANEOUS EVERY 5 MIN PRN
OUTPATIENT
Start: 2024-10-01

## 2024-09-03 ASSESSMENT — PAIN SCALES - GENERAL: PAINLEVEL: 0-NO PAIN

## 2024-09-03 ASSESSMENT — COLUMBIA-SUICIDE SEVERITY RATING SCALE - C-SSRS
2. HAVE YOU ACTUALLY HAD ANY THOUGHTS OF KILLING YOURSELF?: NO
1. IN THE PAST MONTH, HAVE YOU WISHED YOU WERE DEAD OR WISHED YOU COULD GO TO SLEEP AND NOT WAKE UP?: NO
6. HAVE YOU EVER DONE ANYTHING, STARTED TO DO ANYTHING, OR PREPARED TO DO ANYTHING TO END YOUR LIFE?: NO

## 2024-09-03 ASSESSMENT — PATIENT HEALTH QUESTIONNAIRE - PHQ9: 1. LITTLE INTEREST OR PLEASURE IN DOING THINGS: NOT AT ALL

## 2024-09-03 NOTE — PROGRESS NOTES
Pt set up for 1 month- Oct 1st 11am labs at infusion and Vit B12 injection  10/29 11am CNP visit and B12 injection to follow    Reviewed AVS with patient- patient verbalizes understanding

## 2024-09-04 ENCOUNTER — TELEPHONE (OUTPATIENT)
Dept: HEMATOLOGY/ONCOLOGY | Facility: CLINIC | Age: 64
End: 2024-09-04
Payer: MEDICARE

## 2024-09-05 ENCOUNTER — TELEPHONE (OUTPATIENT)
Dept: HEMATOLOGY/ONCOLOGY | Facility: CLINIC | Age: 64
End: 2024-09-05
Payer: MEDICARE

## 2024-09-06 ENCOUNTER — LAB (OUTPATIENT)
Dept: LAB | Facility: LAB | Age: 64
End: 2024-09-06
Payer: MEDICARE

## 2024-09-06 DIAGNOSIS — R18.8 CIRRHOSIS OF LIVER WITH ASCITES, UNSPECIFIED HEPATIC CIRRHOSIS TYPE (MULTI): ICD-10-CM

## 2024-09-06 DIAGNOSIS — R91.1 LUNG NODULE: ICD-10-CM

## 2024-09-06 DIAGNOSIS — K74.60 CIRRHOSIS OF LIVER WITH ASCITES, UNSPECIFIED HEPATIC CIRRHOSIS TYPE (MULTI): ICD-10-CM

## 2024-09-06 LAB — AFP SERPL-MCNC: <4 NG/ML (ref 0–9)

## 2024-09-06 PROCEDURE — 36415 COLL VENOUS BLD VENIPUNCTURE: CPT

## 2024-09-06 PROCEDURE — 82105 ALPHA-FETOPROTEIN SERUM: CPT

## 2024-09-10 ENCOUNTER — HOSPITAL ENCOUNTER (OUTPATIENT)
Dept: RADIOLOGY | Facility: HOSPITAL | Age: 64
Discharge: HOME | End: 2024-09-10
Payer: MEDICARE

## 2024-09-10 DIAGNOSIS — K74.60 CIRRHOSIS OF LIVER WITH ASCITES, UNSPECIFIED HEPATIC CIRRHOSIS TYPE (MULTI): ICD-10-CM

## 2024-09-10 DIAGNOSIS — D64.9 ANEMIA, UNSPECIFIED TYPE: ICD-10-CM

## 2024-09-10 DIAGNOSIS — R18.8 CIRRHOSIS OF LIVER WITH ASCITES, UNSPECIFIED HEPATIC CIRRHOSIS TYPE (MULTI): ICD-10-CM

## 2024-09-10 DIAGNOSIS — J44.9 CHRONIC OBSTRUCTIVE PULMONARY DISEASE, UNSPECIFIED COPD TYPE (MULTI): ICD-10-CM

## 2024-09-10 DIAGNOSIS — R91.1 LUNG NODULE: Primary | ICD-10-CM

## 2024-09-10 DIAGNOSIS — R91.1 LUNG NODULE: ICD-10-CM

## 2024-09-10 PROCEDURE — 74183 MRI ABD W/O CNTR FLWD CNTR: CPT

## 2024-09-10 PROCEDURE — 74183 MRI ABD W/O CNTR FLWD CNTR: CPT | Performed by: RADIOLOGY

## 2024-09-10 PROCEDURE — 74177 CT ABD & PELVIS W/CONTRAST: CPT | Performed by: RADIOLOGY

## 2024-09-10 PROCEDURE — 2550000001 HC RX 255 CONTRASTS

## 2024-09-10 PROCEDURE — A9575 INJ GADOTERATE MEGLUMI 0.1ML: HCPCS

## 2024-09-10 PROCEDURE — 74177 CT ABD & PELVIS W/CONTRAST: CPT

## 2024-09-10 PROCEDURE — A9698 NON-RAD CONTRAST MATERIALNOC: HCPCS

## 2024-09-10 PROCEDURE — 71260 CT THORAX DX C+: CPT | Performed by: RADIOLOGY

## 2024-09-10 RX ORDER — GADOTERATE MEGLUMINE 376.9 MG/ML
20 INJECTION INTRAVENOUS
Status: COMPLETED | OUTPATIENT
Start: 2024-09-10 | End: 2024-09-10

## 2024-09-19 ENCOUNTER — APPOINTMENT (OUTPATIENT)
Dept: RADIOLOGY | Facility: HOSPITAL | Age: 64
End: 2024-09-19
Payer: MEDICARE

## 2024-09-19 ENCOUNTER — LAB (OUTPATIENT)
Dept: LAB | Facility: LAB | Age: 64
End: 2024-09-19
Payer: MEDICARE

## 2024-09-19 DIAGNOSIS — D64.9 ANEMIA, UNSPECIFIED TYPE: ICD-10-CM

## 2024-09-19 DIAGNOSIS — E11.9 TYPE 2 DIABETES MELLITUS WITHOUT COMPLICATION, UNSPECIFIED WHETHER LONG TERM INSULIN USE (MULTI): ICD-10-CM

## 2024-09-19 LAB
ALBUMIN SERPL BCP-MCNC: 4.1 G/DL (ref 3.4–5)
ALP SERPL-CCNC: 101 U/L (ref 33–136)
ALT SERPL W P-5'-P-CCNC: 13 U/L (ref 7–45)
ANION GAP SERPL CALC-SCNC: 12 MMOL/L (ref 10–20)
AST SERPL W P-5'-P-CCNC: 14 U/L (ref 9–39)
BASOPHILS # BLD AUTO: 0.06 X10*3/UL (ref 0–0.1)
BASOPHILS NFR BLD AUTO: 0.7 %
BILIRUB SERPL-MCNC: 0.4 MG/DL (ref 0–1.2)
BUN SERPL-MCNC: 14 MG/DL (ref 6–23)
CALCIUM SERPL-MCNC: 10.4 MG/DL (ref 8.6–10.3)
CHLORIDE SERPL-SCNC: 101 MMOL/L (ref 98–107)
CHOLEST SERPL-MCNC: 198 MG/DL (ref 0–199)
CHOLESTEROL/HDL RATIO: 7.6
CO2 SERPL-SCNC: 27 MMOL/L (ref 21–32)
CREAT SERPL-MCNC: 0.95 MG/DL (ref 0.5–1.05)
EGFRCR SERPLBLD CKD-EPI 2021: 67 ML/MIN/1.73M*2
EOSINOPHIL # BLD AUTO: 0.23 X10*3/UL (ref 0–0.7)
EOSINOPHIL NFR BLD AUTO: 2.8 %
ERYTHROCYTE [DISTWIDTH] IN BLOOD BY AUTOMATED COUNT: 19.7 % (ref 11.5–14.5)
EST. AVERAGE GLUCOSE BLD GHB EST-MCNC: 154 MG/DL
GLUCOSE SERPL-MCNC: 112 MG/DL (ref 74–99)
HBA1C MFR BLD: 7 %
HCT VFR BLD AUTO: 42.8 % (ref 36–46)
HDLC SERPL-MCNC: 26 MG/DL
HGB BLD-MCNC: 13.1 G/DL (ref 12–16)
IMM GRANULOCYTES # BLD AUTO: 0.03 X10*3/UL (ref 0–0.7)
IMM GRANULOCYTES NFR BLD AUTO: 0.4 % (ref 0–0.9)
LDLC SERPL CALC-MCNC: 128 MG/DL
LYMPHOCYTES # BLD AUTO: 1.97 X10*3/UL (ref 1.2–4.8)
LYMPHOCYTES NFR BLD AUTO: 23.9 %
MCH RBC QN AUTO: 23.7 PG (ref 26–34)
MCHC RBC AUTO-ENTMCNC: 30.6 G/DL (ref 32–36)
MCV RBC AUTO: 77 FL (ref 80–100)
MONOCYTES # BLD AUTO: 0.55 X10*3/UL (ref 0.1–1)
MONOCYTES NFR BLD AUTO: 6.7 %
NEUTROPHILS # BLD AUTO: 5.41 X10*3/UL (ref 1.2–7.7)
NEUTROPHILS NFR BLD AUTO: 65.5 %
NON HDL CHOLESTEROL: 172 MG/DL (ref 0–149)
NRBC BLD-RTO: 0 /100 WBCS (ref 0–0)
PLATELET # BLD AUTO: 247 X10*3/UL (ref 150–450)
POTASSIUM SERPL-SCNC: 4.3 MMOL/L (ref 3.5–5.3)
PROT SERPL-MCNC: 7.7 G/DL (ref 6.4–8.2)
RBC # BLD AUTO: 5.53 X10*6/UL (ref 4–5.2)
SODIUM SERPL-SCNC: 136 MMOL/L (ref 136–145)
TRIGL SERPL-MCNC: 218 MG/DL (ref 0–149)
VLDL: 44 MG/DL (ref 0–40)
WBC # BLD AUTO: 8.3 X10*3/UL (ref 4.4–11.3)

## 2024-09-19 PROCEDURE — 83036 HEMOGLOBIN GLYCOSYLATED A1C: CPT

## 2024-09-19 PROCEDURE — 80053 COMPREHEN METABOLIC PANEL: CPT

## 2024-09-19 PROCEDURE — 36415 COLL VENOUS BLD VENIPUNCTURE: CPT

## 2024-09-19 PROCEDURE — 85025 COMPLETE CBC W/AUTO DIFF WBC: CPT

## 2024-09-19 PROCEDURE — 80061 LIPID PANEL: CPT

## 2024-09-20 ENCOUNTER — APPOINTMENT (OUTPATIENT)
Age: 64
End: 2024-09-20
Payer: MEDICARE

## 2024-09-20 VITALS
HEART RATE: 79 BPM | SYSTOLIC BLOOD PRESSURE: 110 MMHG | OXYGEN SATURATION: 95 % | WEIGHT: 206.6 LBS | HEIGHT: 67 IN | DIASTOLIC BLOOD PRESSURE: 70 MMHG | BODY MASS INDEX: 32.43 KG/M2

## 2024-09-20 DIAGNOSIS — E53.8 B12 DEFICIENCY: ICD-10-CM

## 2024-09-20 DIAGNOSIS — Z72.0 TOBACCO ABUSE: ICD-10-CM

## 2024-09-20 DIAGNOSIS — E11.9 TYPE 2 DIABETES MELLITUS WITHOUT COMPLICATION, UNSPECIFIED WHETHER LONG TERM INSULIN USE (MULTI): ICD-10-CM

## 2024-09-20 DIAGNOSIS — K21.9 GASTROESOPHAGEAL REFLUX DISEASE, UNSPECIFIED WHETHER ESOPHAGITIS PRESENT: ICD-10-CM

## 2024-09-20 DIAGNOSIS — K74.60 HEPATIC CIRRHOSIS, UNSPECIFIED HEPATIC CIRRHOSIS TYPE, UNSPECIFIED WHETHER ASCITES PRESENT (MULTI): ICD-10-CM

## 2024-09-20 DIAGNOSIS — D64.9 ANEMIA, UNSPECIFIED TYPE: ICD-10-CM

## 2024-09-20 DIAGNOSIS — E87.6 HYPOKALEMIA: ICD-10-CM

## 2024-09-20 DIAGNOSIS — I47.10 PAROXYSMAL SVT (SUPRAVENTRICULAR TACHYCARDIA) (CMS-HCC): ICD-10-CM

## 2024-09-20 DIAGNOSIS — J43.2 CENTRILOBULAR EMPHYSEMA (MULTI): ICD-10-CM

## 2024-09-20 PROCEDURE — 3078F DIAST BP <80 MM HG: CPT | Performed by: FAMILY MEDICINE

## 2024-09-20 PROCEDURE — 99214 OFFICE O/P EST MOD 30 MIN: CPT | Performed by: FAMILY MEDICINE

## 2024-09-20 PROCEDURE — 1160F RVW MEDS BY RX/DR IN RCRD: CPT | Performed by: FAMILY MEDICINE

## 2024-09-20 PROCEDURE — 3051F HG A1C>EQUAL 7.0%<8.0%: CPT | Performed by: FAMILY MEDICINE

## 2024-09-20 PROCEDURE — 3049F LDL-C 100-129 MG/DL: CPT | Performed by: FAMILY MEDICINE

## 2024-09-20 PROCEDURE — 3008F BODY MASS INDEX DOCD: CPT | Performed by: FAMILY MEDICINE

## 2024-09-20 PROCEDURE — 1159F MED LIST DOCD IN RCRD: CPT | Performed by: FAMILY MEDICINE

## 2024-09-20 PROCEDURE — 3074F SYST BP LT 130 MM HG: CPT | Performed by: FAMILY MEDICINE

## 2024-09-20 RX ORDER — FUROSEMIDE 40 MG/1
40 TABLET ORAL DAILY
Qty: 90 TABLET | Refills: 3 | Status: SHIPPED | OUTPATIENT
Start: 2024-09-20 | End: 2025-09-20

## 2024-09-20 RX ORDER — METOPROLOL TARTRATE 25 MG/1
25 TABLET, FILM COATED ORAL 2 TIMES DAILY
Qty: 180 TABLET | Refills: 3 | Status: SHIPPED | OUTPATIENT
Start: 2024-09-20 | End: 2025-09-20

## 2024-09-20 ASSESSMENT — ENCOUNTER SYMPTOMS
ABDOMINAL DISTENTION: 1
RHINORRHEA: 0
APPETITE CHANGE: 0
UNEXPECTED WEIGHT CHANGE: 0
DIFFICULTY URINATING: 0
DIZZINESS: 0
COUGH: 0
NUMBNESS: 0
CONSTIPATION: 0
SLEEP DISTURBANCE: 0
ABDOMINAL PAIN: 0
PALPITATIONS: 0
FATIGUE: 0
HEADACHES: 0
DIARRHEA: 0
ACTIVITY CHANGE: 0
WHEEZING: 0
TROUBLE SWALLOWING: 0
SHORTNESS OF BREATH: 0
ADENOPATHY: 0
LIGHT-HEADEDNESS: 0
DYSPHORIC MOOD: 0
VOMITING: 0
NERVOUS/ANXIOUS: 0
NAUSEA: 0
ARTHRALGIAS: 0

## 2024-09-20 NOTE — PROGRESS NOTES
"Subjective   Patient ID: Ana Melvin is a 64 y.o. female who presents for 1 MO LABS.    HPI   No low blood sugars since last OV, seen opthalmology in the past year, and no numbness or tingling in feet, skin normal.  No headache, chest pain, shortness of breath, dizziness, lightheadedness, or edema  Sees GI and to see surgery in [place] for liver and GB  Surgery defers GB surgery due to risk      Review of Systems   Constitutional:  Negative for activity change, appetite change, fatigue and unexpected weight change.   HENT:  Negative for ear pain, nosebleeds, rhinorrhea, sneezing and trouble swallowing.    Respiratory:  Negative for cough, shortness of breath and wheezing.    Cardiovascular:  Positive for leg swelling. Negative for chest pain and palpitations.   Gastrointestinal:  Positive for abdominal distention. Negative for abdominal pain, constipation, diarrhea, nausea and vomiting.   Genitourinary:  Negative for difficulty urinating.   Musculoskeletal:  Negative for arthralgias.   Skin:  Negative for rash.   Neurological:  Negative for dizziness, light-headedness, numbness and headaches.   Hematological:  Negative for adenopathy.   Psychiatric/Behavioral:  Negative for behavioral problems, dysphoric mood and sleep disturbance. The patient is not nervous/anxious.    All other systems reviewed and are negative.      Objective   /70   Pulse 79   Ht 1.689 m (5' 6.5\")   Wt 93.7 kg (206 lb 9.6 oz)   SpO2 95%   BMI 32.85 kg/m²     Physical Exam  Vitals and nursing note reviewed.   Constitutional:       General: She is not in acute distress.     Appearance: Normal appearance. She is not toxic-appearing.   HENT:      Head: Normocephalic and atraumatic.      Right Ear: Tympanic membrane, ear canal and external ear normal.      Left Ear: Tympanic membrane, ear canal and external ear normal.      Nose: Nose normal.      Mouth/Throat:      Mouth: Mucous membranes are moist.      Pharynx: Oropharynx is clear. "   Eyes:      Extraocular Movements: Extraocular movements intact.      Conjunctiva/sclera: Conjunctivae normal.      Pupils: Pupils are equal, round, and reactive to light.   Cardiovascular:      Rate and Rhythm: Normal rate and regular rhythm.      Pulses: Normal pulses.      Heart sounds: Normal heart sounds.   Pulmonary:      Effort: Pulmonary effort is normal.      Breath sounds: Normal breath sounds.   Abdominal:      General: Abdomen is flat. Bowel sounds are normal. There is distension.      Palpations: Abdomen is soft. There is no mass.      Tenderness: There is no abdominal tenderness. There is no guarding or rebound.      Hernia: No hernia is present.   Musculoskeletal:      Cervical back: Normal range of motion and neck supple.   Skin:     General: Skin is warm and dry.      Capillary Refill: Capillary refill takes less than 2 seconds.   Neurological:      General: No focal deficit present.      Mental Status: She is alert and oriented to person, place, and time. Mental status is at baseline.   Psychiatric:         Mood and Affect: Mood normal.         Behavior: Behavior normal.         Assessment/Plan   Problem List Items Addressed This Visit             ICD-10-CM    COPD (chronic obstructive pulmonary disease) (Multi) J44.9     Strongly encouraged to quit smoking, continue with inhalers as needed.         Relevant Orders    Follow Up In Primary Care - Established    Diabetes mellitus (Multi) E11.9     A1c testing at 7, continue with current medication, caution with glimepiride with low blood sugars.         Relevant Orders    Albumin-Creatinine Ratio, Urine Random    Comprehensive Metabolic Panel    Hemoglobin A1C    Thyroid Stimulating Hormone    Lipid Panel    Follow Up In Primary Care - Established    GERD (gastroesophageal reflux disease) K21.9     Stable with medication.         Relevant Orders    Follow Up In Primary Care - Established    Paroxysmal SVT (supraventricular tachycardia) (CMS-[facility])  I47.10    Relevant Medications    metoprolol tartrate (Lopressor) 25 mg tablet    furosemide (Lasix) 40 mg tablet    Other Relevant Orders    CBC and Auto Differential    Follow Up In Primary Care - Established    B12 deficiency E53.8    Relevant Orders    Follow Up In Primary Care - Established    Anemia D64.9     Post iron infusion, hemoglobin is markedly improved.  Now normal at 13.         Relevant Orders    CBC and Auto Differential    Follow Up In Primary Care - Established    Hepatic cirrhosis (Multi) K74.60     Liver function testing stable, weight down about 10 pounds, swelling is improved, no tenderness over the abdomen, does have mild ascites with some mild caput, but abdomen is not overly distended.  Not necessarily recommend paracentesis unless patient is uncomfortable and abdomen is markedly distended and taut.  Patient was strongly encouraged to follow-up with her hepatologist, if needs help to contact the office, still needs to get liver biopsy done.  Had seen general surgeon for gallbladder issues who refused to do surgery unless patient is in a tertiary care center.         Relevant Orders    Comprehensive Metabolic Panel    CBC and Auto Differential    Follow Up In Primary Care - Established     Other Visit Diagnoses         Codes    Hypokalemia     E87.6    Relevant Orders    Follow Up In Primary Care - Established    Tobacco abuse     Z72.0    Relevant Orders    Follow Up In Primary Care - Established

## 2024-09-20 NOTE — ASSESSMENT & PLAN NOTE
Liver function testing stable, weight down about 10 pounds, swelling is improved, no tenderness over the abdomen, does have mild ascites with some mild caput, but abdomen is not overly distended.  Not necessarily recommend paracentesis unless patient is uncomfortable and abdomen is markedly distended and taut.  Patient was strongly encouraged to follow-up with her hepatologist, if needs help to contact the office, still needs to get liver biopsy done.  Had seen general surgeon for gallbladder issues who refused to do surgery unless patient is in a tertiary care center.

## 2024-09-20 NOTE — ASSESSMENT & PLAN NOTE
A1c testing at 7, continue with current medication, caution with glimepiride with low blood sugars.

## 2024-10-01 ENCOUNTER — HOSPITAL ENCOUNTER (OUTPATIENT)
Dept: CARDIOLOGY | Facility: HOSPITAL | Age: 64
Discharge: HOME | End: 2024-10-01
Payer: MEDICARE

## 2024-10-01 ENCOUNTER — APPOINTMENT (OUTPATIENT)
Dept: RADIOLOGY | Facility: HOSPITAL | Age: 64
End: 2024-10-01
Payer: MEDICARE

## 2024-10-01 ENCOUNTER — INFUSION (OUTPATIENT)
Dept: HEMATOLOGY/ONCOLOGY | Facility: CLINIC | Age: 64
End: 2024-10-01
Payer: MEDICARE

## 2024-10-01 ENCOUNTER — HOSPITAL ENCOUNTER (EMERGENCY)
Facility: HOSPITAL | Age: 64
Discharge: HOME | End: 2024-10-01
Attending: EMERGENCY MEDICINE
Payer: MEDICARE

## 2024-10-01 VITALS
TEMPERATURE: 98 F | SYSTOLIC BLOOD PRESSURE: 131 MMHG | BODY MASS INDEX: 33.11 KG/M2 | HEIGHT: 66 IN | DIASTOLIC BLOOD PRESSURE: 78 MMHG | OXYGEN SATURATION: 97 % | RESPIRATION RATE: 16 BRPM | HEART RATE: 78 BPM | WEIGHT: 206 LBS

## 2024-10-01 VITALS
HEART RATE: 70 BPM | WEIGHT: 203.93 LBS | RESPIRATION RATE: 16 BRPM | BODY MASS INDEX: 32.42 KG/M2 | OXYGEN SATURATION: 94 % | DIASTOLIC BLOOD PRESSURE: 74 MMHG | SYSTOLIC BLOOD PRESSURE: 119 MMHG | TEMPERATURE: 94.6 F

## 2024-10-01 DIAGNOSIS — E61.1 IRON DEFICIENCY: ICD-10-CM

## 2024-10-01 DIAGNOSIS — R07.9 ACUTE CHEST PAIN: Primary | ICD-10-CM

## 2024-10-01 DIAGNOSIS — E53.8 B12 DEFICIENCY: ICD-10-CM

## 2024-10-01 DIAGNOSIS — D64.9 ANEMIA, UNSPECIFIED TYPE: ICD-10-CM

## 2024-10-01 LAB
ALBUMIN SERPL BCP-MCNC: 4.2 G/DL (ref 3.4–5)
ALP SERPL-CCNC: 104 U/L (ref 33–136)
ALT SERPL W P-5'-P-CCNC: 14 U/L (ref 7–45)
ANION GAP SERPL CALC-SCNC: 12 MMOL/L (ref 10–20)
ANION GAP SERPL CALC-SCNC: 14 MMOL/L (ref 10–20)
AST SERPL W P-5'-P-CCNC: 18 U/L (ref 9–39)
BASOPHILS # BLD AUTO: 0.07 X10*3/UL (ref 0–0.1)
BASOPHILS # BLD AUTO: 0.08 X10*3/UL (ref 0–0.1)
BASOPHILS NFR BLD AUTO: 0.7 %
BASOPHILS NFR BLD AUTO: 0.9 %
BILIRUB SERPL-MCNC: 0.5 MG/DL (ref 0–1.2)
BNP SERPL-MCNC: 34 PG/ML (ref 0–99)
BUN SERPL-MCNC: 14 MG/DL (ref 6–23)
BUN SERPL-MCNC: 15 MG/DL (ref 6–23)
CALCIUM SERPL-MCNC: 10.6 MG/DL (ref 8.6–10.3)
CALCIUM SERPL-MCNC: 11 MG/DL (ref 8.6–10.3)
CARDIAC TROPONIN I PNL SERPL HS: <3 NG/L (ref 0–13)
CARDIAC TROPONIN I PNL SERPL HS: <3 NG/L (ref 0–13)
CHLORIDE SERPL-SCNC: 98 MMOL/L (ref 98–107)
CHLORIDE SERPL-SCNC: 98 MMOL/L (ref 98–107)
CO2 SERPL-SCNC: 27 MMOL/L (ref 21–32)
CO2 SERPL-SCNC: 28 MMOL/L (ref 21–32)
CREAT SERPL-MCNC: 1.05 MG/DL (ref 0.5–1.05)
CREAT SERPL-MCNC: 1.11 MG/DL (ref 0.5–1.05)
EGFRCR SERPLBLD CKD-EPI 2021: 55 ML/MIN/1.73M*2
EGFRCR SERPLBLD CKD-EPI 2021: 59 ML/MIN/1.73M*2
EOSINOPHIL # BLD AUTO: 0.22 X10*3/UL (ref 0–0.7)
EOSINOPHIL # BLD AUTO: 0.3 X10*3/UL (ref 0–0.7)
EOSINOPHIL NFR BLD AUTO: 2.6 %
EOSINOPHIL NFR BLD AUTO: 2.8 %
ERYTHROCYTE [DISTWIDTH] IN BLOOD BY AUTOMATED COUNT: 17.2 % (ref 11.5–14.5)
ERYTHROCYTE [DISTWIDTH] IN BLOOD BY AUTOMATED COUNT: 17.6 % (ref 11.5–14.5)
GLUCOSE BLD MANUAL STRIP-MCNC: 119 MG/DL (ref 74–99)
GLUCOSE SERPL-MCNC: 126 MG/DL (ref 74–99)
GLUCOSE SERPL-MCNC: 95 MG/DL (ref 74–99)
HCT VFR BLD AUTO: 43.4 % (ref 36–46)
HCT VFR BLD AUTO: 43.8 % (ref 36–46)
HGB BLD-MCNC: 13.5 G/DL (ref 12–16)
HGB BLD-MCNC: 13.8 G/DL (ref 12–16)
IMM GRANULOCYTES # BLD AUTO: 0.02 X10*3/UL (ref 0–0.7)
IMM GRANULOCYTES # BLD AUTO: 0.05 X10*3/UL (ref 0–0.7)
IMM GRANULOCYTES NFR BLD AUTO: 0.2 % (ref 0–0.9)
IMM GRANULOCYTES NFR BLD AUTO: 0.5 % (ref 0–0.9)
IRON SATN MFR SERPL: 9 % (ref 25–45)
IRON SERPL-MCNC: 40 UG/DL (ref 35–150)
LYMPHOCYTES # BLD AUTO: 1.91 X10*3/UL (ref 1.2–4.8)
LYMPHOCYTES # BLD AUTO: 2.5 X10*3/UL (ref 1.2–4.8)
LYMPHOCYTES NFR BLD AUTO: 22.6 %
LYMPHOCYTES NFR BLD AUTO: 23.3 %
MCH RBC QN AUTO: 23.6 PG (ref 26–34)
MCH RBC QN AUTO: 24.2 PG (ref 26–34)
MCHC RBC AUTO-ENTMCNC: 30.8 G/DL (ref 32–36)
MCHC RBC AUTO-ENTMCNC: 31.8 G/DL (ref 32–36)
MCV RBC AUTO: 76 FL (ref 80–100)
MCV RBC AUTO: 77 FL (ref 80–100)
MONOCYTES # BLD AUTO: 0.54 X10*3/UL (ref 0.1–1)
MONOCYTES # BLD AUTO: 0.78 X10*3/UL (ref 0.1–1)
MONOCYTES NFR BLD AUTO: 6.4 %
MONOCYTES NFR BLD AUTO: 7.3 %
NEUTROPHILS # BLD AUTO: 5.67 X10*3/UL (ref 1.2–7.7)
NEUTROPHILS # BLD AUTO: 7.01 X10*3/UL (ref 1.2–7.7)
NEUTROPHILS NFR BLD AUTO: 65.4 %
NEUTROPHILS NFR BLD AUTO: 67.3 %
NRBC BLD-RTO: 0 /100 WBCS (ref 0–0)
NRBC BLD-RTO: 0 /100 WBCS (ref 0–0)
PLATELET # BLD AUTO: 246 X10*3/UL (ref 150–450)
PLATELET # BLD AUTO: 249 X10*3/UL (ref 150–450)
POTASSIUM SERPL-SCNC: 3.8 MMOL/L (ref 3.5–5.3)
POTASSIUM SERPL-SCNC: 4.8 MMOL/L (ref 3.5–5.3)
PROT SERPL-MCNC: 7.4 G/DL (ref 6.4–8.2)
RBC # BLD AUTO: 5.7 X10*6/UL (ref 4–5.2)
RBC # BLD AUTO: 5.71 X10*6/UL (ref 4–5.2)
SODIUM SERPL-SCNC: 134 MMOL/L (ref 136–145)
SODIUM SERPL-SCNC: 134 MMOL/L (ref 136–145)
TIBC SERPL-MCNC: 443 UG/DL (ref 240–445)
UIBC SERPL-MCNC: 403 UG/DL (ref 110–370)
VIT B12 SERPL-MCNC: 5082 PG/ML (ref 211–911)
WBC # BLD AUTO: 10.7 X10*3/UL (ref 4.4–11.3)
WBC # BLD AUTO: 8.4 X10*3/UL (ref 4.4–11.3)

## 2024-10-01 PROCEDURE — 71275 CT ANGIOGRAPHY CHEST: CPT

## 2024-10-01 PROCEDURE — 84484 ASSAY OF TROPONIN QUANT: CPT | Performed by: EMERGENCY MEDICINE

## 2024-10-01 PROCEDURE — 85025 COMPLETE CBC W/AUTO DIFF WBC: CPT | Performed by: EMERGENCY MEDICINE

## 2024-10-01 PROCEDURE — 82947 ASSAY GLUCOSE BLOOD QUANT: CPT | Mod: 59

## 2024-10-01 PROCEDURE — 36415 COLL VENOUS BLD VENIPUNCTURE: CPT

## 2024-10-01 PROCEDURE — 71045 X-RAY EXAM CHEST 1 VIEW: CPT | Mod: FOREIGN READ | Performed by: RADIOLOGY

## 2024-10-01 PROCEDURE — 71275 CT ANGIOGRAPHY CHEST: CPT | Mod: FOREIGN READ | Performed by: RADIOLOGY

## 2024-10-01 PROCEDURE — 36415 COLL VENOUS BLD VENIPUNCTURE: CPT | Performed by: EMERGENCY MEDICINE

## 2024-10-01 PROCEDURE — 83540 ASSAY OF IRON: CPT

## 2024-10-01 PROCEDURE — 93005 ELECTROCARDIOGRAM TRACING: CPT

## 2024-10-01 PROCEDURE — 2500000004 HC RX 250 GENERAL PHARMACY W/ HCPCS (ALT 636 FOR OP/ED)

## 2024-10-01 PROCEDURE — 85025 COMPLETE CBC W/AUTO DIFF WBC: CPT

## 2024-10-01 PROCEDURE — 99285 EMERGENCY DEPT VISIT HI MDM: CPT | Mod: 25

## 2024-10-01 PROCEDURE — 84075 ASSAY ALKALINE PHOSPHATASE: CPT

## 2024-10-01 PROCEDURE — 80048 BASIC METABOLIC PNL TOTAL CA: CPT | Performed by: EMERGENCY MEDICINE

## 2024-10-01 PROCEDURE — 2550000001 HC RX 255 CONTRASTS: Performed by: EMERGENCY MEDICINE

## 2024-10-01 PROCEDURE — 96372 THER/PROPH/DIAG INJ SC/IM: CPT | Mod: 59

## 2024-10-01 PROCEDURE — 82607 VITAMIN B-12: CPT

## 2024-10-01 PROCEDURE — 83880 ASSAY OF NATRIURETIC PEPTIDE: CPT | Performed by: EMERGENCY MEDICINE

## 2024-10-01 PROCEDURE — 71045 X-RAY EXAM CHEST 1 VIEW: CPT

## 2024-10-01 PROCEDURE — 2500000001 HC RX 250 WO HCPCS SELF ADMINISTERED DRUGS (ALT 637 FOR MEDICARE OP): Performed by: EMERGENCY MEDICINE

## 2024-10-01 RX ORDER — ALBUTEROL SULFATE 0.83 MG/ML
3 SOLUTION RESPIRATORY (INHALATION) AS NEEDED
OUTPATIENT
Start: 2024-10-29

## 2024-10-01 RX ORDER — ASPIRIN 325 MG
325 TABLET ORAL ONCE
Status: COMPLETED | OUTPATIENT
Start: 2024-10-01 | End: 2024-10-01

## 2024-10-01 RX ORDER — CYANOCOBALAMIN 1000 UG/ML
1000 INJECTION, SOLUTION INTRAMUSCULAR; SUBCUTANEOUS ONCE
Status: COMPLETED | OUTPATIENT
Start: 2024-10-01 | End: 2024-10-01

## 2024-10-01 RX ORDER — CYANOCOBALAMIN 1000 UG/ML
1000 INJECTION, SOLUTION INTRAMUSCULAR; SUBCUTANEOUS ONCE
OUTPATIENT
Start: 2024-10-29

## 2024-10-01 RX ORDER — DIPHENHYDRAMINE HYDROCHLORIDE 50 MG/ML
50 INJECTION INTRAMUSCULAR; INTRAVENOUS AS NEEDED
OUTPATIENT
Start: 2024-10-29

## 2024-10-01 RX ORDER — NITROGLYCERIN 0.4 MG/1
0.4 TABLET SUBLINGUAL ONCE
Status: COMPLETED | OUTPATIENT
Start: 2024-10-01 | End: 2024-10-01

## 2024-10-01 RX ORDER — EPINEPHRINE 0.3 MG/.3ML
0.3 INJECTION SUBCUTANEOUS EVERY 5 MIN PRN
OUTPATIENT
Start: 2024-10-29

## 2024-10-01 RX ORDER — FAMOTIDINE 10 MG/ML
20 INJECTION INTRAVENOUS ONCE AS NEEDED
OUTPATIENT
Start: 2024-10-29

## 2024-10-01 RX ORDER — CYANOCOBALAMIN 1000 UG/ML
INJECTION, SOLUTION INTRAMUSCULAR; SUBCUTANEOUS
Status: COMPLETED
Start: 2024-10-01 | End: 2024-10-01

## 2024-10-01 ASSESSMENT — PAIN SCALES - GENERAL
PAINLEVEL: 0-NO PAIN
PAINLEVEL_OUTOF10: 7
PAINLEVEL_OUTOF10: 4

## 2024-10-01 ASSESSMENT — PAIN DESCRIPTION - LOCATION: LOCATION: CHEST

## 2024-10-01 ASSESSMENT — PAIN - FUNCTIONAL ASSESSMENT: PAIN_FUNCTIONAL_ASSESSMENT: 0-10

## 2024-10-01 NOTE — ED PROVIDER NOTES
HPI   Chief Complaint   Patient presents with    Chest Pain     C/o upper chest pain x 1 hr, states it started while sitting at the computer. C/o increased pain with deep breath.        Patient presents to the emergency department secondary to chest pain.  This began at approximately 4:30 PM today while she was sitting at a computer at home.  She states that there is a pleuritic component with pain with deep inspiration.  States that the pain is across the entire anterior chest and shoulders.  No history of similar symptoms.  Denies any cardiac history other than SVT      History provided by:  Patient   used: No            Patient History   Past Medical History:   Diagnosis Date    Epigastric abdominal pain of unknown etiology 04/12/2023    Personal history of other medical treatment     History of bone density study    Personal history of other medical treatment     History of mammogram    Pleurisy 05/25/2023    Vaginitis due to Candida albicans 05/25/2023     Past Surgical History:   Procedure Laterality Date    OTHER SURGICAL HISTORY  02/24/2020    [place] tooth extraction    OTHER SURGICAL HISTORY  02/24/2020    Breast augmentation    OTHER SURGICAL HISTORY  02/24/2020    Cervical biopsy     Family History   Problem Relation Name Age of Onset    Other (CVA) Mother      Coronary artery disease Mother      Heart attack Father      Other (BLADDER CANCER) Father      Multiple myeloma Sister      Heart attack Brother      Kidney failure Brother       Social History     Tobacco Use    Smoking status: Every Day     Types: Cigarettes     Passive exposure: Current    Smokeless tobacco: Never   Vaping Use    Vaping status: Never Used   Substance Use Topics    Alcohol use: Not Currently    Drug use: Defer       Physical Exam   ED Triage Vitals   Temp Pulse Resp BP   -- -- -- --      SpO2 Temp src Heart Rate Source Patient Position   -- -- -- --      BP Location FiO2 (%)     -- --       Physical  Exam  Vitals and nursing note reviewed.   Constitutional:       General: She is not in acute distress.     Appearance: Normal appearance. She is normal weight. She is not ill-appearing, toxic-appearing or diaphoretic.   HENT:      Head: Normocephalic and atraumatic.      Nose: Nose normal. No rhinorrhea.   Neck:      Comments: Trachea is midline  Cardiovascular:      Rate and Rhythm: Normal rate and regular rhythm.      Heart sounds: No murmur heard.  Pulmonary:      Effort: Pulmonary effort is normal.      Breath sounds: Normal breath sounds. No wheezing.   Abdominal:      General: Abdomen is flat. Bowel sounds are normal. There is no distension.      Palpations: Abdomen is soft.      Tenderness: There is no abdominal tenderness.   Musculoskeletal:         General: Normal range of motion.      Cervical back: Normal range of motion.   Skin:     General: Skin is warm and dry.      Findings: No rash.   Neurological:      General: No focal deficit present.      Mental Status: She is alert and oriented to person, place, and time. Mental status is at baseline.   Psychiatric:         Mood and Affect: Mood normal.         Behavior: Behavior normal.         Thought Content: Thought content normal.         Judgment: Judgment normal.           ED Course & MDM                  No data recorded                                 Medical Decision Making  Twelve-lead EKG was interpreted by myself and this was noted to contribute directly to patient care.  Study reveals a normal sinus rhythm at 88 bpm, leftward axis, delayed R wave progression, no acute ischemic changes.    Repeat twelve-lead EKG performed at 6:32 PM was also interpreted by myself and this was noted to contribute directly to patient care.  Study reveals normal sinus rhythm with a first-degree AV block at 72 bpm, leftward axis, overall low voltage, delayed R wave progression, no acute ischemic changes.    Laboratory and radiographic studies are pending.  Patient was  endorsed to the oncoming provider.  Please see their note for interpretation of the pending studies and final disposition        Procedure  Procedures     Pee Steven,   10/01/24 2200

## 2024-10-01 NOTE — ED PROVIDER NOTES
Medical Decision Making  Patient care was taken over by myself at 7 PM.  We are awaiting repeat troponin and CTA of the chest results.  The repeat troponin was negative.  The CTA of the chest revealed no acute or chronic pulmonary emboli.  No thoracic aortic aneurysm or dissection.  Mild paraseptal emphysema at the right and left apices and scattered patchy groundglass opacities within both upper lung fields worrisome for evolving multifocal pneumonia.  After getting the CTA results I did have a discussion with the patient concerning her lab and CT results prior to discharging the patient home.  Mended she see her primary care doctor and referred her to a cardiologist for follow-up concerning her chest pain symptoms.  I told her that she has had groundglass opacities in her lungs.  CT scan imaging and perhaps she may need to see a pulmonologist if these continue.  There is no evidence of infection without any history of fever, chills night sweats or elevation of her white cell count.        Emergency Medicine Transition of Care Note.    I received Ana Melvin in signout from Dr. Steven.  Please see the previous ED provider note for all HPI, PE and MDM up to the time of signout at 1900. This is in addition to the primary record.    In brief Ana Melvin is an 64 y.o. female presenting for   Chief Complaint   Patient presents with    Chest Pain     C/o upper chest pain x 1 hr, states it started while sitting at the computer. C/o increased pain with deep breath.      At the time of signout we were awaiting: Second troponin and CT of the chest results.          Final diagnoses:   [R07.9] Acute chest pain           Procedure  Procedures    DO Flash Walker DO  10/02/24 3538

## 2024-10-02 LAB
ATRIAL RATE: 72 BPM
ATRIAL RATE: 88 BPM
P AXIS: 66 DEGREES
P AXIS: 71 DEGREES
P OFFSET: 156 MS
P OFFSET: 175 MS
P ONSET: 102 MS
P ONSET: 111 MS
PR INTERVAL: 202 MS
PR INTERVAL: 218 MS
Q ONSET: 211 MS
Q ONSET: 212 MS
QRS COUNT: 12 BEATS
QRS COUNT: 15 BEATS
QRS DURATION: 84 MS
QRS DURATION: 90 MS
QT INTERVAL: 350 MS
QT INTERVAL: 388 MS
QTC CALCULATION(BAZETT): 423 MS
QTC CALCULATION(BAZETT): 424 MS
QTC FREDERICIA: 397 MS
QTC FREDERICIA: 412 MS
R AXIS: -38 DEGREES
R AXIS: -71 DEGREES
T AXIS: 47 DEGREES
T AXIS: 59 DEGREES
T OFFSET: 387 MS
T OFFSET: 405 MS
VENTRICULAR RATE: 72 BPM
VENTRICULAR RATE: 88 BPM

## 2024-10-02 NOTE — DISCHARGE INSTRUCTIONS
You can use over-the-counter lidocaine patches to help with your chest discomfort.  Recommend you follow-up with cardiology concerning her chest pain symptoms.  If you continue to have problems with your lungs I would recommend you talk to your primary care doctor about potentially seeing a pulmonologist.

## 2024-10-03 ENCOUNTER — OFFICE VISIT (OUTPATIENT)
Age: 64
End: 2024-10-03
Payer: MEDICARE

## 2024-10-03 ENCOUNTER — HOSPITAL ENCOUNTER (OUTPATIENT)
Age: 64
Discharge: HOME | End: 2024-10-03
Payer: MEDICARE

## 2024-10-03 VITALS
SYSTOLIC BLOOD PRESSURE: 110 MMHG | OXYGEN SATURATION: 98 % | BODY MASS INDEX: 32.59 KG/M2 | WEIGHT: 202.8 LBS | HEART RATE: 78 BPM | DIASTOLIC BLOOD PRESSURE: 74 MMHG | HEIGHT: 66 IN

## 2024-10-03 DIAGNOSIS — I47.10 PAROXYSMAL SVT (SUPRAVENTRICULAR TACHYCARDIA) (CMS-HCC): ICD-10-CM

## 2024-10-03 DIAGNOSIS — K74.60 CIRRHOSIS OF LIVER WITH ASCITES, UNSPECIFIED HEPATIC CIRRHOSIS TYPE (MULTI): ICD-10-CM

## 2024-10-03 DIAGNOSIS — R10.9: ICD-10-CM

## 2024-10-03 DIAGNOSIS — R14.0: Primary | ICD-10-CM

## 2024-10-03 DIAGNOSIS — R91.1 LUNG NODULE: ICD-10-CM

## 2024-10-03 DIAGNOSIS — J43.2 CENTRILOBULAR EMPHYSEMA (MULTI): ICD-10-CM

## 2024-10-03 DIAGNOSIS — R18.8 CIRRHOSIS OF LIVER WITH ASCITES, UNSPECIFIED HEPATIC CIRRHOSIS TYPE (MULTI): ICD-10-CM

## 2024-10-03 DIAGNOSIS — R07.9 CHEST PAIN, UNSPECIFIED TYPE: Primary | ICD-10-CM

## 2024-10-03 PROCEDURE — 1160F RVW MEDS BY RX/DR IN RCRD: CPT | Performed by: FAMILY MEDICINE

## 2024-10-03 PROCEDURE — 76705 ECHO EXAM OF ABDOMEN: CPT

## 2024-10-03 PROCEDURE — 99214 OFFICE O/P EST MOD 30 MIN: CPT | Performed by: FAMILY MEDICINE

## 2024-10-03 PROCEDURE — 3051F HG A1C>EQUAL 7.0%<8.0%: CPT | Performed by: FAMILY MEDICINE

## 2024-10-03 PROCEDURE — 3008F BODY MASS INDEX DOCD: CPT | Performed by: FAMILY MEDICINE

## 2024-10-03 PROCEDURE — 3074F SYST BP LT 130 MM HG: CPT | Performed by: FAMILY MEDICINE

## 2024-10-03 PROCEDURE — 4004F PT TOBACCO SCREEN RCVD TLK: CPT | Performed by: FAMILY MEDICINE

## 2024-10-03 PROCEDURE — 1159F MED LIST DOCD IN RCRD: CPT | Performed by: FAMILY MEDICINE

## 2024-10-03 PROCEDURE — 3078F DIAST BP <80 MM HG: CPT | Performed by: FAMILY MEDICINE

## 2024-10-03 PROCEDURE — 3049F LDL-C 100-129 MG/DL: CPT | Performed by: FAMILY MEDICINE

## 2024-10-03 RX ORDER — METOPROLOL TARTRATE 50 MG/1
50 TABLET ORAL 2 TIMES DAILY
Qty: 180 TABLET | Refills: 3 | Status: SHIPPED | OUTPATIENT
Start: 2024-10-03 | End: 2025-10-03

## 2024-10-03 ASSESSMENT — ENCOUNTER SYMPTOMS
CHEST TIGHTNESS: 0
FEVER: 0
SHORTNESS OF BREATH: 0
FATIGUE: 0
ABDOMINAL PAIN: 0
ACTIVITY CHANGE: 0
DIARRHEA: 0
APPETITE CHANGE: 0
NAUSEA: 0
COUGH: 0
CONSTIPATION: 0
PALPITATIONS: 0
CHILLS: 0
VOMITING: 0

## 2024-10-03 NOTE — ASSESSMENT & PLAN NOTE
Encouraged to follow-up with her gastroenterology specialist.  May need to see specialist at tertiary care center.  Edema does seem to be much better with the addition of spironolactone, weight is down, minimal ascites on examination and no ankle swelling.

## 2024-10-03 NOTE — PROGRESS NOTES
"Subjective   Patient ID: Ana Melvin is a 64 y.o. female who presents for ER F/U Chest Pain.    HPI   Was in ER 10/1 for CP, pain across chest, hurt to take a deep breath, no SOB, sudden onset, pain constant, lasted 2 hours  Never had prior, no nausea or burping  No change in exercise tolerance, no CP/SOB  that is out of the normal.   Uses albuterol at times, some nebulizer treatments    Review of Systems   Constitutional:  Negative for activity change, appetite change, chills, fatigue and fever.   Respiratory:  Negative for cough, chest tightness and shortness of breath.    Cardiovascular:  Negative for chest pain, palpitations and leg swelling.   Gastrointestinal:  Negative for abdominal pain, constipation, diarrhea, nausea and vomiting.       Objective   /74   Pulse 78   Ht 1.676 m (5' 6\")   Wt 92 kg (202 lb 12.8 oz)   SpO2 98%   BMI 32.73 kg/m²     Physical Exam  Vitals and nursing note reviewed.   Constitutional:       Appearance: Normal appearance.   HENT:      Head: Normocephalic and atraumatic.      Right Ear: Tympanic membrane, ear canal and external ear normal.      Left Ear: Tympanic membrane, ear canal and external ear normal.      Nose: Nose normal.      Mouth/Throat:      Mouth: Mucous membranes are moist.      Pharynx: Oropharynx is clear.   Cardiovascular:      Rate and Rhythm: Normal rate and regular rhythm.      Pulses: Normal pulses.      Heart sounds: Normal heart sounds.   Pulmonary:      Effort: Pulmonary effort is normal.      Breath sounds: Normal breath sounds.   Musculoskeletal:      Cervical back: Normal range of motion and neck supple.   Neurological:      Mental Status: She is alert.   Psychiatric:         Mood and Affect: Mood normal.         Behavior: Behavior normal.         Assessment/Plan   Problem List Items Addressed This Visit             ICD-10-CM    COPD (chronic obstructive pulmonary disease) (Multi) J44.9     Encouraged to continue to work on quitting smoking, CT " scan done in the hospital emergency room reveals similar findings from a month ago, groundglass opacities have been seen before, that being followed every 6 to 12 months.  Patient encouraged to use nebulizer treatments as directed.         Paroxysmal SVT (supraventricular tachycardia) (CMS-HCC) I47.10    Lung nodule R91.1    Hepatic cirrhosis (Multi) K74.60     Encouraged to follow-up with her gastroenterology specialist.  May need to see specialist at tertiary care center.  Edema does seem to be much better with the addition of spironolactone, weight is down, minimal ascites on examination and no ankle swelling.          Other Visit Diagnoses         Codes    Chest pain, unspecified type    -  Primary R07.9    Negative ER evaluation including troponins, symptoms sound more pleuritic, encouraged to use albuterol if happens again and work on quitting smoking

## 2024-10-03 NOTE — ASSESSMENT & PLAN NOTE
Encouraged to continue to work on quitting smoking, CT scan done in the hospital emergency room reveals similar findings from a month ago, groundglass opacities have been seen before, that being followed every 6 to 12 months.  Patient encouraged to use nebulizer treatments as directed.

## 2024-10-15 ENCOUNTER — OFFICE VISIT (OUTPATIENT)
Age: 64
End: 2024-10-15
Payer: MEDICARE

## 2024-10-15 VITALS
WEIGHT: 201.8 LBS | HEIGHT: 66 IN | DIASTOLIC BLOOD PRESSURE: 60 MMHG | BODY MASS INDEX: 32.43 KG/M2 | HEART RATE: 86 BPM | OXYGEN SATURATION: 96 % | SYSTOLIC BLOOD PRESSURE: 120 MMHG

## 2024-10-15 DIAGNOSIS — H69.91 EUSTACHIAN TUBE DISORDER, RIGHT: Primary | ICD-10-CM

## 2024-10-15 DIAGNOSIS — T78.40XS ALLERGY, SEQUELA: ICD-10-CM

## 2024-10-15 PROCEDURE — 4004F PT TOBACCO SCREEN RCVD TLK: CPT | Performed by: FAMILY MEDICINE

## 2024-10-15 PROCEDURE — 3008F BODY MASS INDEX DOCD: CPT | Performed by: FAMILY MEDICINE

## 2024-10-15 PROCEDURE — 3078F DIAST BP <80 MM HG: CPT | Performed by: FAMILY MEDICINE

## 2024-10-15 PROCEDURE — 3051F HG A1C>EQUAL 7.0%<8.0%: CPT | Performed by: FAMILY MEDICINE

## 2024-10-15 PROCEDURE — 99213 OFFICE O/P EST LOW 20 MIN: CPT | Performed by: FAMILY MEDICINE

## 2024-10-15 PROCEDURE — 3049F LDL-C 100-129 MG/DL: CPT | Performed by: FAMILY MEDICINE

## 2024-10-15 PROCEDURE — 3074F SYST BP LT 130 MM HG: CPT | Performed by: FAMILY MEDICINE

## 2024-10-15 PROCEDURE — 1159F MED LIST DOCD IN RCRD: CPT | Performed by: FAMILY MEDICINE

## 2024-10-15 PROCEDURE — 1160F RVW MEDS BY RX/DR IN RCRD: CPT | Performed by: FAMILY MEDICINE

## 2024-10-15 PROCEDURE — 96372 THER/PROPH/DIAG INJ SC/IM: CPT | Performed by: FAMILY MEDICINE

## 2024-10-15 RX ORDER — TRIAMCINOLONE ACETONIDE 40 MG/ML
40 INJECTION, SUSPENSION INTRA-ARTICULAR; INTRAMUSCULAR ONCE
Status: DISCONTINUED | OUTPATIENT
Start: 2024-10-15 | End: 2024-10-15

## 2024-10-15 RX ORDER — TRIAMCINOLONE ACETONIDE 55 UG/1
2 SPRAY, METERED NASAL DAILY
Qty: 16.5 G | Refills: 11 | Status: SHIPPED | OUTPATIENT
Start: 2024-10-15 | End: 2025-10-15

## 2024-10-15 RX ORDER — TRIAMCINOLONE ACETONIDE 40 MG/ML
40 INJECTION, SUSPENSION INTRA-ARTICULAR; INTRAMUSCULAR ONCE
Status: COMPLETED | OUTPATIENT
Start: 2024-10-15 | End: 2024-10-15

## 2024-10-15 ASSESSMENT — ENCOUNTER SYMPTOMS
PALPITATIONS: 0
SINUS PRESSURE: 0
APPETITE CHANGE: 0
FATIGUE: 0
FEVER: 0
ACTIVITY CHANGE: 0
RHINORRHEA: 1
CHEST TIGHTNESS: 0
SORE THROAT: 0
COUGH: 0
NAUSEA: 0
SINUS PAIN: 0
CHILLS: 0
VOMITING: 0
ABDOMINAL PAIN: 0
CONSTIPATION: 0
SHORTNESS OF BREATH: 0
DIARRHEA: 0

## 2024-10-15 NOTE — PROGRESS NOTES
"Subjective   Patient ID: Ana Melvin is a 64 y.o. female who presents for Rt Ear Pain.    HPI   Right ear pain for the past few days, pain anterior to the ear, clicking, no drainage, no hearing issues, no change in dizziness, + fullness and pressure, some nasal congestion    Review of Systems   Constitutional:  Negative for activity change, appetite change, chills, fatigue and fever.   HENT:  Positive for congestion, ear pain and rhinorrhea. Negative for ear discharge, postnasal drip, sinus pressure, sinus pain, sneezing and sore throat.    Respiratory:  Negative for cough, chest tightness and shortness of breath.    Cardiovascular:  Negative for chest pain, palpitations and leg swelling.   Gastrointestinal:  Negative for abdominal pain, constipation, diarrhea, nausea and vomiting.       Objective   /60   Pulse 86   Ht 1.676 m (5' 6\")   Wt 91.5 kg (201 lb 12.8 oz)   SpO2 96%   BMI 32.57 kg/m²     Physical Exam  Vitals and nursing note reviewed.   Constitutional:       Appearance: Normal appearance.   HENT:      Head: Normocephalic and atraumatic.      Right Ear: Tympanic membrane, ear canal and external ear normal.      Left Ear: Tympanic membrane, ear canal and external ear normal.      Nose: Nose normal.      Mouth/Throat:      Mouth: Mucous membranes are moist.      Pharynx: Oropharynx is clear.   Cardiovascular:      Rate and Rhythm: Normal rate and regular rhythm.      Pulses: Normal pulses.      Heart sounds: Normal heart sounds.   Pulmonary:      Effort: Pulmonary effort is normal.      Breath sounds: Normal breath sounds.   Musculoskeletal:      Cervical back: Normal range of motion and neck supple.   Neurological:      Mental Status: She is alert.   Psychiatric:         Mood and Affect: Mood normal.         Behavior: Behavior normal.         Assessment/Plan   Problem List Items Addressed This Visit             ICD-10-CM    Allergies T78.40XA    Relevant Medications    triamcinolone (Nasacort) 55 " mcg nasal inhaler     Other Visit Diagnoses         Codes    Eustachian tube disorder, right    -  Primary H69.91    Continue with allergy medication, recommend nasal saline rinse along with Nasacort.     Relevant Medications    triamcinolone (Nasacort) 55 mcg nasal inhaler

## 2024-10-15 NOTE — PATIENT INSTRUCTIONS
Use over the counter nasal saline, continue allergy medicine and add Nasacort once a night to the nose

## 2024-10-29 ENCOUNTER — INFUSION (OUTPATIENT)
Dept: HEMATOLOGY/ONCOLOGY | Facility: CLINIC | Age: 64
End: 2024-10-29
Payer: MEDICARE

## 2024-10-29 ENCOUNTER — OFFICE VISIT (OUTPATIENT)
Dept: HEMATOLOGY/ONCOLOGY | Facility: CLINIC | Age: 64
End: 2024-10-29
Payer: MEDICARE

## 2024-10-29 VITALS
OXYGEN SATURATION: 97 % | RESPIRATION RATE: 20 BRPM | TEMPERATURE: 96.4 F | SYSTOLIC BLOOD PRESSURE: 126 MMHG | HEART RATE: 81 BPM | DIASTOLIC BLOOD PRESSURE: 78 MMHG | BODY MASS INDEX: 31.41 KG/M2 | WEIGHT: 194.6 LBS

## 2024-10-29 DIAGNOSIS — E61.1 IRON DEFICIENCY: ICD-10-CM

## 2024-10-29 DIAGNOSIS — D64.9 ANEMIA, UNSPECIFIED TYPE: ICD-10-CM

## 2024-10-29 DIAGNOSIS — E53.8 B12 DEFICIENCY: ICD-10-CM

## 2024-10-29 DIAGNOSIS — D64.9 ANEMIA, UNSPECIFIED TYPE: Primary | ICD-10-CM

## 2024-10-29 PROCEDURE — 99213 OFFICE O/P EST LOW 20 MIN: CPT

## 2024-10-29 PROCEDURE — 3051F HG A1C>EQUAL 7.0%<8.0%: CPT

## 2024-10-29 PROCEDURE — 1126F AMNT PAIN NOTED NONE PRSNT: CPT

## 2024-10-29 PROCEDURE — 2500000004 HC RX 250 GENERAL PHARMACY W/ HCPCS (ALT 636 FOR OP/ED)

## 2024-10-29 PROCEDURE — 3078F DIAST BP <80 MM HG: CPT

## 2024-10-29 PROCEDURE — 1159F MED LIST DOCD IN RCRD: CPT

## 2024-10-29 PROCEDURE — 96372 THER/PROPH/DIAG INJ SC/IM: CPT

## 2024-10-29 PROCEDURE — 3049F LDL-C 100-129 MG/DL: CPT

## 2024-10-29 PROCEDURE — 3074F SYST BP LT 130 MM HG: CPT

## 2024-10-29 RX ORDER — FAMOTIDINE 10 MG/ML
20 INJECTION INTRAVENOUS ONCE AS NEEDED
OUTPATIENT
Start: 2024-11-26

## 2024-10-29 RX ORDER — ALBUTEROL SULFATE 0.83 MG/ML
3 SOLUTION RESPIRATORY (INHALATION) AS NEEDED
Status: CANCELLED | OUTPATIENT
Start: 2024-11-01

## 2024-10-29 RX ORDER — DIPHENHYDRAMINE HYDROCHLORIDE 50 MG/ML
50 INJECTION INTRAMUSCULAR; INTRAVENOUS AS NEEDED
OUTPATIENT
Start: 2024-11-26

## 2024-10-29 RX ORDER — EPINEPHRINE 0.3 MG/.3ML
0.3 INJECTION SUBCUTANEOUS EVERY 5 MIN PRN
OUTPATIENT
Start: 2024-11-26

## 2024-10-29 RX ORDER — FAMOTIDINE 10 MG/ML
20 INJECTION INTRAVENOUS ONCE AS NEEDED
Status: CANCELLED | OUTPATIENT
Start: 2024-11-01

## 2024-10-29 RX ORDER — ALBUTEROL SULFATE 0.83 MG/ML
3 SOLUTION RESPIRATORY (INHALATION) AS NEEDED
OUTPATIENT
Start: 2024-11-26

## 2024-10-29 RX ORDER — DIPHENHYDRAMINE HYDROCHLORIDE 50 MG/ML
50 INJECTION INTRAMUSCULAR; INTRAVENOUS AS NEEDED
Status: CANCELLED | OUTPATIENT
Start: 2024-11-01

## 2024-10-29 RX ORDER — CYANOCOBALAMIN 1000 UG/ML
1000 INJECTION, SOLUTION INTRAMUSCULAR; SUBCUTANEOUS ONCE
OUTPATIENT
Start: 2024-11-26

## 2024-10-29 RX ORDER — EPINEPHRINE 0.3 MG/.3ML
0.3 INJECTION SUBCUTANEOUS EVERY 5 MIN PRN
Status: CANCELLED | OUTPATIENT
Start: 2024-11-01

## 2024-10-29 RX ORDER — CYANOCOBALAMIN 1000 UG/ML
1000 INJECTION, SOLUTION INTRAMUSCULAR; SUBCUTANEOUS ONCE
Status: COMPLETED | OUTPATIENT
Start: 2024-10-29 | End: 2024-10-29

## 2024-10-29 ASSESSMENT — PAIN SCALES - GENERAL: PAINLEVEL_OUTOF10: 0-NO PAIN

## 2024-11-07 ENCOUNTER — INFUSION (OUTPATIENT)
Dept: HEMATOLOGY/ONCOLOGY | Facility: CLINIC | Age: 64
End: 2024-11-07
Payer: MEDICARE

## 2024-11-07 VITALS
SYSTOLIC BLOOD PRESSURE: 118 MMHG | OXYGEN SATURATION: 97 % | DIASTOLIC BLOOD PRESSURE: 72 MMHG | HEART RATE: 69 BPM | WEIGHT: 195.11 LBS | TEMPERATURE: 96.1 F | BODY MASS INDEX: 31.49 KG/M2 | RESPIRATION RATE: 18 BRPM

## 2024-11-07 DIAGNOSIS — E61.1 IRON DEFICIENCY: ICD-10-CM

## 2024-11-07 DIAGNOSIS — D64.9 ANEMIA, UNSPECIFIED TYPE: ICD-10-CM

## 2024-11-07 DIAGNOSIS — E53.8 B12 DEFICIENCY: ICD-10-CM

## 2024-11-07 PROCEDURE — 2500000004 HC RX 250 GENERAL PHARMACY W/ HCPCS (ALT 636 FOR OP/ED)

## 2024-11-07 PROCEDURE — 96365 THER/PROPH/DIAG IV INF INIT: CPT

## 2024-11-07 RX ORDER — HEPARIN 100 UNIT/ML
500 SYRINGE INTRAVENOUS AS NEEDED
OUTPATIENT
Start: 2024-11-07

## 2024-11-07 RX ORDER — HEPARIN SODIUM,PORCINE/PF 10 UNIT/ML
50 SYRINGE (ML) INTRAVENOUS AS NEEDED
OUTPATIENT
Start: 2024-11-07

## 2024-11-07 RX ORDER — EPINEPHRINE 0.3 MG/.3ML
0.3 INJECTION SUBCUTANEOUS EVERY 5 MIN PRN
OUTPATIENT
Start: 2024-11-14

## 2024-11-07 RX ORDER — ALBUTEROL SULFATE 0.83 MG/ML
3 SOLUTION RESPIRATORY (INHALATION) AS NEEDED
OUTPATIENT
Start: 2024-11-14

## 2024-11-07 RX ORDER — DIPHENHYDRAMINE HYDROCHLORIDE 50 MG/ML
50 INJECTION INTRAMUSCULAR; INTRAVENOUS AS NEEDED
OUTPATIENT
Start: 2024-11-14

## 2024-11-07 RX ORDER — FAMOTIDINE 10 MG/ML
20 INJECTION INTRAVENOUS ONCE AS NEEDED
OUTPATIENT
Start: 2024-11-14

## 2024-11-07 ASSESSMENT — PAIN SCALES - GENERAL: PAINLEVEL_OUTOF10: 0-NO PAIN

## 2024-11-14 ENCOUNTER — APPOINTMENT (OUTPATIENT)
Dept: HEMATOLOGY/ONCOLOGY | Facility: CLINIC | Age: 64
End: 2024-11-14
Payer: MEDICARE

## 2024-11-18 ENCOUNTER — INFUSION (OUTPATIENT)
Dept: HEMATOLOGY/ONCOLOGY | Facility: CLINIC | Age: 64
End: 2024-11-18
Payer: MEDICARE

## 2024-11-18 VITALS
SYSTOLIC BLOOD PRESSURE: 118 MMHG | HEART RATE: 61 BPM | WEIGHT: 193.34 LBS | DIASTOLIC BLOOD PRESSURE: 72 MMHG | TEMPERATURE: 97.2 F | OXYGEN SATURATION: 98 % | BODY MASS INDEX: 31.21 KG/M2 | RESPIRATION RATE: 18 BRPM

## 2024-11-18 DIAGNOSIS — E61.1 IRON DEFICIENCY: ICD-10-CM

## 2024-11-18 DIAGNOSIS — E53.8 B12 DEFICIENCY: ICD-10-CM

## 2024-11-18 DIAGNOSIS — D64.9 ANEMIA, UNSPECIFIED TYPE: ICD-10-CM

## 2024-11-18 PROCEDURE — 2500000004 HC RX 250 GENERAL PHARMACY W/ HCPCS (ALT 636 FOR OP/ED): Mod: JZ

## 2024-11-18 PROCEDURE — 96365 THER/PROPH/DIAG IV INF INIT: CPT

## 2024-11-18 RX ORDER — HEPARIN 100 UNIT/ML
500 SYRINGE INTRAVENOUS AS NEEDED
OUTPATIENT
Start: 2024-11-18

## 2024-11-18 RX ORDER — EPINEPHRINE 0.3 MG/.3ML
0.3 INJECTION SUBCUTANEOUS EVERY 5 MIN PRN
Status: CANCELLED | OUTPATIENT
Start: 2024-11-21

## 2024-11-18 RX ORDER — ALBUTEROL SULFATE 0.83 MG/ML
3 SOLUTION RESPIRATORY (INHALATION) AS NEEDED
Status: CANCELLED | OUTPATIENT
Start: 2024-11-21

## 2024-11-18 RX ORDER — FAMOTIDINE 10 MG/ML
20 INJECTION INTRAVENOUS ONCE AS NEEDED
Status: CANCELLED | OUTPATIENT
Start: 2024-11-21

## 2024-11-18 RX ORDER — DIPHENHYDRAMINE HYDROCHLORIDE 50 MG/ML
50 INJECTION INTRAMUSCULAR; INTRAVENOUS AS NEEDED
Status: CANCELLED | OUTPATIENT
Start: 2024-11-21

## 2024-11-18 RX ORDER — HEPARIN SODIUM,PORCINE/PF 10 UNIT/ML
50 SYRINGE (ML) INTRAVENOUS AS NEEDED
OUTPATIENT
Start: 2024-11-18

## 2024-11-18 ASSESSMENT — PAIN SCALES - GENERAL: PAINLEVEL_OUTOF10: 0-NO PAIN

## 2024-11-26 ENCOUNTER — INFUSION (OUTPATIENT)
Dept: HEMATOLOGY/ONCOLOGY | Facility: CLINIC | Age: 64
End: 2024-11-26
Payer: MEDICARE

## 2024-11-26 VITALS
OXYGEN SATURATION: 98 % | TEMPERATURE: 96.4 F | WEIGHT: 194.45 LBS | RESPIRATION RATE: 18 BRPM | BODY MASS INDEX: 31.38 KG/M2 | HEART RATE: 70 BPM | SYSTOLIC BLOOD PRESSURE: 131 MMHG | DIASTOLIC BLOOD PRESSURE: 74 MMHG

## 2024-11-26 DIAGNOSIS — D64.9 ANEMIA, UNSPECIFIED TYPE: ICD-10-CM

## 2024-11-26 DIAGNOSIS — E53.8 B12 DEFICIENCY: Primary | ICD-10-CM

## 2024-11-26 PROCEDURE — 96372 THER/PROPH/DIAG INJ SC/IM: CPT

## 2024-11-26 PROCEDURE — 2500000004 HC RX 250 GENERAL PHARMACY W/ HCPCS (ALT 636 FOR OP/ED)

## 2024-11-26 RX ORDER — CYANOCOBALAMIN 1000 UG/ML
1000 INJECTION, SOLUTION INTRAMUSCULAR; SUBCUTANEOUS ONCE
Status: COMPLETED | OUTPATIENT
Start: 2024-11-26 | End: 2024-11-26

## 2024-11-26 RX ORDER — EPINEPHRINE 0.3 MG/.3ML
0.3 INJECTION SUBCUTANEOUS EVERY 5 MIN PRN
OUTPATIENT
Start: 2024-12-24

## 2024-11-26 RX ORDER — ALBUTEROL SULFATE 0.83 MG/ML
3 SOLUTION RESPIRATORY (INHALATION) AS NEEDED
OUTPATIENT
Start: 2024-12-24

## 2024-11-26 RX ORDER — DIPHENHYDRAMINE HYDROCHLORIDE 50 MG/ML
50 INJECTION INTRAMUSCULAR; INTRAVENOUS AS NEEDED
OUTPATIENT
Start: 2024-12-24

## 2024-11-26 RX ORDER — CYANOCOBALAMIN 1000 UG/ML
1000 INJECTION, SOLUTION INTRAMUSCULAR; SUBCUTANEOUS ONCE
OUTPATIENT
Start: 2024-12-24

## 2024-11-26 RX ORDER — FAMOTIDINE 10 MG/ML
20 INJECTION INTRAVENOUS ONCE AS NEEDED
OUTPATIENT
Start: 2024-12-24

## 2024-11-26 ASSESSMENT — PAIN SCALES - GENERAL: PAINLEVEL_OUTOF10: 0-NO PAIN

## 2024-12-04 ENCOUNTER — OFFICE VISIT (OUTPATIENT)
Age: 64
End: 2024-12-04
Payer: MEDICARE

## 2024-12-04 VITALS
WEIGHT: 193.6 LBS | HEART RATE: 67 BPM | OXYGEN SATURATION: 97 % | DIASTOLIC BLOOD PRESSURE: 80 MMHG | HEIGHT: 66 IN | SYSTOLIC BLOOD PRESSURE: 110 MMHG | BODY MASS INDEX: 31.12 KG/M2

## 2024-12-04 DIAGNOSIS — J43.2 CENTRILOBULAR EMPHYSEMA (MULTI): ICD-10-CM

## 2024-12-04 DIAGNOSIS — B35.4 TINEA CORPORIS: Primary | ICD-10-CM

## 2024-12-04 DIAGNOSIS — E11.9 TYPE 2 DIABETES MELLITUS WITHOUT COMPLICATION, UNSPECIFIED WHETHER LONG TERM INSULIN USE (MULTI): ICD-10-CM

## 2024-12-04 DIAGNOSIS — I47.10 PAROXYSMAL SVT (SUPRAVENTRICULAR TACHYCARDIA) (CMS-HCC): ICD-10-CM

## 2024-12-04 DIAGNOSIS — K21.9 GASTROESOPHAGEAL REFLUX DISEASE, UNSPECIFIED WHETHER ESOPHAGITIS PRESENT: ICD-10-CM

## 2024-12-04 DIAGNOSIS — E87.6 HYPOKALEMIA: ICD-10-CM

## 2024-12-04 PROCEDURE — 3079F DIAST BP 80-89 MM HG: CPT | Performed by: FAMILY MEDICINE

## 2024-12-04 PROCEDURE — 3074F SYST BP LT 130 MM HG: CPT | Performed by: FAMILY MEDICINE

## 2024-12-04 PROCEDURE — 1160F RVW MEDS BY RX/DR IN RCRD: CPT | Performed by: FAMILY MEDICINE

## 2024-12-04 PROCEDURE — 3008F BODY MASS INDEX DOCD: CPT | Performed by: FAMILY MEDICINE

## 2024-12-04 PROCEDURE — 99213 OFFICE O/P EST LOW 20 MIN: CPT | Performed by: FAMILY MEDICINE

## 2024-12-04 PROCEDURE — 1159F MED LIST DOCD IN RCRD: CPT | Performed by: FAMILY MEDICINE

## 2024-12-04 PROCEDURE — 4004F PT TOBACCO SCREEN RCVD TLK: CPT | Performed by: FAMILY MEDICINE

## 2024-12-04 PROCEDURE — 3051F HG A1C>EQUAL 7.0%<8.0%: CPT | Performed by: FAMILY MEDICINE

## 2024-12-04 PROCEDURE — 3049F LDL-C 100-129 MG/DL: CPT | Performed by: FAMILY MEDICINE

## 2024-12-04 RX ORDER — GLIMEPIRIDE 4 MG/1
4 TABLET ORAL
Qty: 180 TABLET | Refills: 3 | Status: SHIPPED | OUTPATIENT
Start: 2024-12-04 | End: 2025-12-04

## 2024-12-04 RX ORDER — DILTIAZEM HYDROCHLORIDE 180 MG/1
180 CAPSULE, COATED, EXTENDED RELEASE ORAL DAILY
Qty: 90 CAPSULE | Refills: 3 | Status: SHIPPED | OUTPATIENT
Start: 2024-12-04 | End: 2025-12-04

## 2024-12-04 RX ORDER — OMEPRAZOLE 40 MG/1
40 CAPSULE, DELAYED RELEASE ORAL DAILY
Qty: 90 CAPSULE | Refills: 3 | Status: SHIPPED | OUTPATIENT
Start: 2024-12-04 | End: 2025-12-04

## 2024-12-04 RX ORDER — POTASSIUM CHLORIDE 750 MG/1
10 TABLET, FILM COATED, EXTENDED RELEASE ORAL DAILY
Qty: 90 TABLET | Refills: 3 | Status: SHIPPED | OUTPATIENT
Start: 2024-12-04 | End: 2025-12-04

## 2024-12-04 RX ORDER — METFORMIN HYDROCHLORIDE 750 MG/1
1500 TABLET, EXTENDED RELEASE ORAL DAILY
Qty: 180 TABLET | Refills: 3 | Status: SHIPPED | OUTPATIENT
Start: 2024-12-04 | End: 2025-12-04

## 2024-12-04 RX ORDER — ALBUTEROL SULFATE 0.83 MG/ML
2.5 SOLUTION RESPIRATORY (INHALATION) 2 TIMES DAILY
Qty: 540 ML | Refills: 3 | Status: SHIPPED | OUTPATIENT
Start: 2024-12-04 | End: 2025-12-04

## 2024-12-04 RX ORDER — METOPROLOL TARTRATE 25 MG/1
25 TABLET, FILM COATED ORAL 2 TIMES DAILY
Qty: 180 TABLET | Refills: 3 | Status: SHIPPED | OUTPATIENT
Start: 2024-12-04 | End: 2025-12-04

## 2024-12-04 RX ORDER — LANCETS
EACH MISCELLANEOUS
Qty: 100 EACH | Refills: 3 | Status: SHIPPED | OUTPATIENT
Start: 2024-12-04

## 2024-12-04 RX ORDER — IBUPROFEN 200 MG
1 CAPSULE ORAL DAILY
Qty: 100 EACH | Refills: 3 | Status: SHIPPED | OUTPATIENT
Start: 2024-12-04

## 2024-12-04 RX ORDER — NYSTATIN 100000 [USP'U]/G
1 POWDER TOPICAL 2 TIMES DAILY
Qty: 60 G | Refills: 1 | Status: SHIPPED | OUTPATIENT
Start: 2024-12-04 | End: 2025-01-03

## 2024-12-04 RX ORDER — ALBUTEROL SULFATE 90 UG/1
2 INHALANT RESPIRATORY (INHALATION) EVERY 4 HOURS PRN
Qty: 36 G | Refills: 3 | Status: SHIPPED | OUTPATIENT
Start: 2024-12-04 | End: 2025-12-04

## 2024-12-04 ASSESSMENT — ENCOUNTER SYMPTOMS
DIARRHEA: 0
CHEST TIGHTNESS: 0
ACTIVITY CHANGE: 0
CONSTIPATION: 0
SHORTNESS OF BREATH: 0
ABDOMINAL PAIN: 0
NAUSEA: 0
APPETITE CHANGE: 0
COUGH: 0
FATIGUE: 0
VOMITING: 0
PALPITATIONS: 0

## 2024-12-04 NOTE — PROGRESS NOTES
"Subjective   Patient ID: Ana Melvin is a 64 y.o. female who presents for rash under breasts.    HPI   + red/itchy/odor, under breast    Review of Systems   Constitutional:  Negative for activity change, appetite change and fatigue.   Respiratory:  Negative for cough, chest tightness and shortness of breath.    Cardiovascular:  Negative for chest pain, palpitations and leg swelling.   Gastrointestinal:  Negative for abdominal pain, constipation, diarrhea, nausea and vomiting.   Skin:  Positive for rash.       Objective   /80   Pulse 67   Ht 1.676 m (5' 6\")   Wt 87.8 kg (193 lb 9.6 oz)   SpO2 97%   BMI 31.25 kg/m²     Physical Exam  Vitals and nursing note reviewed.   Constitutional:       Appearance: Normal appearance.   HENT:      Head: Normocephalic and atraumatic.      Right Ear: Tympanic membrane, ear canal and external ear normal.      Left Ear: Tympanic membrane, ear canal and external ear normal.      Nose: Nose normal.      Mouth/Throat:      Mouth: Mucous membranes are moist.      Pharynx: Oropharynx is clear.   Cardiovascular:      Rate and Rhythm: Normal rate and regular rhythm.      Pulses: Normal pulses.      Heart sounds: Normal heart sounds.   Pulmonary:      Effort: Pulmonary effort is normal.      Breath sounds: Normal breath sounds.   Musculoskeletal:      Cervical back: Normal range of motion and neck supple.   Skin:     Comments: Mildly erythematous rash under both breasts and under pannus.  No areas that are open, no blistering, nontender, no discharge.   Neurological:      Mental Status: She is alert.   Psychiatric:         Mood and Affect: Mood normal.         Behavior: Behavior normal.         Assessment/Plan   Problem List Items Addressed This Visit             ICD-10-CM    COPD (chronic obstructive pulmonary disease) (Multi) J44.9    Relevant Medications    albuterol 2.5 mg /3 mL (0.083 %) nebulizer solution    albuterol 90 mcg/actuation inhaler    umeclidinium-vilanteroL (Anoro " Ellipta) 62.5-25 mcg/actuation blister with device    Diabetes mellitus (Multi) E11.9    Relevant Medications    blood sugar diagnostic (Blood Glucose Test) strip    glimepiride (Amaryl) 4 mg tablet    lancets misc    metFORMIN XR (Glucophage-XR) 750 mg 24 hr tablet    GERD (gastroesophageal reflux disease) K21.9    Relevant Medications    omeprazole (PriLOSEC) 40 mg DR capsule    Paroxysmal SVT (supraventricular tachycardia) (CMS-[facility]) I47.10    Relevant Medications    dilTIAZem CD (Cartia XT) 180 mg 24 hr capsule    metoprolol tartrate (Lopressor) 25 mg tablet     Other Visit Diagnoses         Codes    Tinea corporis    -  Primary B35.4    Nystatin powder twice daily till clear, advised about over-the-counter athlete's foot treatments that can be used as well.     Relevant Medications    nystatin (Mycostatin) 100,000 unit/gram powder    Hypokalemia     E87.6    Relevant Medications    potassium chloride CR 10 mEq ER tablet

## 2024-12-23 ENCOUNTER — INFUSION (OUTPATIENT)
Dept: HEMATOLOGY/ONCOLOGY | Facility: CLINIC | Age: 64
End: 2024-12-23
Payer: MEDICARE

## 2024-12-23 VITALS
TEMPERATURE: 96.1 F | DIASTOLIC BLOOD PRESSURE: 80 MMHG | BODY MASS INDEX: 31.03 KG/M2 | SYSTOLIC BLOOD PRESSURE: 144 MMHG | OXYGEN SATURATION: 98 % | HEART RATE: 74 BPM | WEIGHT: 192.24 LBS | RESPIRATION RATE: 18 BRPM

## 2024-12-23 DIAGNOSIS — E53.8 B12 DEFICIENCY: ICD-10-CM

## 2024-12-23 DIAGNOSIS — D64.9 ANEMIA, UNSPECIFIED TYPE: ICD-10-CM

## 2024-12-23 PROCEDURE — 2500000004 HC RX 250 GENERAL PHARMACY W/ HCPCS (ALT 636 FOR OP/ED)

## 2024-12-23 PROCEDURE — 96372 THER/PROPH/DIAG INJ SC/IM: CPT

## 2024-12-23 RX ORDER — ALBUTEROL SULFATE 0.83 MG/ML
3 SOLUTION RESPIRATORY (INHALATION) AS NEEDED
OUTPATIENT
Start: 2024-12-24

## 2024-12-23 RX ORDER — CYANOCOBALAMIN 1000 UG/ML
1000 INJECTION, SOLUTION INTRAMUSCULAR; SUBCUTANEOUS ONCE
OUTPATIENT
Start: 2024-12-24

## 2024-12-23 RX ORDER — DIPHENHYDRAMINE HYDROCHLORIDE 50 MG/ML
50 INJECTION INTRAMUSCULAR; INTRAVENOUS AS NEEDED
OUTPATIENT
Start: 2024-12-24

## 2024-12-23 RX ORDER — EPINEPHRINE 0.3 MG/.3ML
0.3 INJECTION SUBCUTANEOUS EVERY 5 MIN PRN
OUTPATIENT
Start: 2024-12-24

## 2024-12-23 RX ORDER — FAMOTIDINE 10 MG/ML
20 INJECTION INTRAVENOUS ONCE AS NEEDED
OUTPATIENT
Start: 2024-12-24

## 2024-12-23 RX ORDER — CYANOCOBALAMIN 1000 UG/ML
1000 INJECTION, SOLUTION INTRAMUSCULAR; SUBCUTANEOUS ONCE
Status: COMPLETED | OUTPATIENT
Start: 2024-12-23 | End: 2024-12-23

## 2024-12-23 ASSESSMENT — PAIN SCALES - GENERAL: PAINLEVEL_OUTOF10: 0-NO PAIN

## 2025-01-20 ENCOUNTER — INFUSION (OUTPATIENT)
Dept: HEMATOLOGY/ONCOLOGY | Facility: CLINIC | Age: 65
End: 2025-01-20
Payer: MEDICARE

## 2025-01-20 VITALS
OXYGEN SATURATION: 100 % | TEMPERATURE: 95.5 F | RESPIRATION RATE: 18 BRPM | HEART RATE: 69 BPM | DIASTOLIC BLOOD PRESSURE: 80 MMHG | WEIGHT: 196.21 LBS | SYSTOLIC BLOOD PRESSURE: 136 MMHG | BODY MASS INDEX: 31.67 KG/M2

## 2025-01-20 DIAGNOSIS — E53.8 B12 DEFICIENCY: ICD-10-CM

## 2025-01-20 DIAGNOSIS — D64.9 ANEMIA, UNSPECIFIED TYPE: ICD-10-CM

## 2025-01-20 PROCEDURE — 96372 THER/PROPH/DIAG INJ SC/IM: CPT

## 2025-01-20 PROCEDURE — 2500000004 HC RX 250 GENERAL PHARMACY W/ HCPCS (ALT 636 FOR OP/ED)

## 2025-01-20 RX ORDER — ALBUTEROL SULFATE 0.83 MG/ML
3 SOLUTION RESPIRATORY (INHALATION) AS NEEDED
OUTPATIENT
Start: 2025-02-17

## 2025-01-20 RX ORDER — CYANOCOBALAMIN 1000 UG/ML
INJECTION, SOLUTION INTRAMUSCULAR; SUBCUTANEOUS
Status: COMPLETED
Start: 2025-01-20 | End: 2025-01-20

## 2025-01-20 RX ORDER — EPINEPHRINE 0.3 MG/.3ML
0.3 INJECTION SUBCUTANEOUS EVERY 5 MIN PRN
OUTPATIENT
Start: 2025-02-17

## 2025-01-20 RX ORDER — DIPHENHYDRAMINE HYDROCHLORIDE 50 MG/ML
50 INJECTION INTRAMUSCULAR; INTRAVENOUS AS NEEDED
OUTPATIENT
Start: 2025-02-17

## 2025-01-20 RX ORDER — FAMOTIDINE 10 MG/ML
20 INJECTION INTRAVENOUS ONCE AS NEEDED
OUTPATIENT
Start: 2025-02-17

## 2025-01-20 RX ORDER — CYANOCOBALAMIN 1000 UG/ML
1000 INJECTION, SOLUTION INTRAMUSCULAR; SUBCUTANEOUS ONCE
OUTPATIENT
Start: 2025-02-17

## 2025-01-20 RX ORDER — CYANOCOBALAMIN 1000 UG/ML
1000 INJECTION, SOLUTION INTRAMUSCULAR; SUBCUTANEOUS ONCE
Status: COMPLETED | OUTPATIENT
Start: 2025-01-20 | End: 2025-01-20

## 2025-01-20 RX ADMIN — CYANOCOBALAMIN 1000 MCG: 1000 INJECTION, SOLUTION INTRAMUSCULAR; SUBCUTANEOUS at 13:29

## 2025-01-20 ASSESSMENT — PAIN SCALES - GENERAL: PAINLEVEL_OUTOF10: 2

## 2025-02-13 ENCOUNTER — LAB (OUTPATIENT)
Dept: LAB | Facility: HOSPITAL | Age: 65
End: 2025-02-13
Payer: MEDICARE

## 2025-02-13 ENCOUNTER — APPOINTMENT (OUTPATIENT)
Dept: LAB | Facility: HOSPITAL | Age: 65
End: 2025-02-13
Payer: MEDICARE

## 2025-02-13 DIAGNOSIS — E53.8 B12 DEFICIENCY: ICD-10-CM

## 2025-02-13 DIAGNOSIS — E61.1 IRON DEFICIENCY: ICD-10-CM

## 2025-02-13 DIAGNOSIS — D64.9 ANEMIA, UNSPECIFIED TYPE: ICD-10-CM

## 2025-02-13 LAB
ALBUMIN SERPL BCP-MCNC: 4.5 G/DL (ref 3.4–5)
ALP SERPL-CCNC: 98 U/L (ref 33–136)
ALT SERPL W P-5'-P-CCNC: 15 U/L (ref 7–45)
ANION GAP SERPL CALC-SCNC: 14 MMOL/L (ref 10–20)
AST SERPL W P-5'-P-CCNC: 16 U/L (ref 9–39)
BASOPHILS # BLD AUTO: 0.09 X10*3/UL (ref 0–0.1)
BASOPHILS NFR BLD AUTO: 0.9 %
BILIRUB SERPL-MCNC: 0.5 MG/DL (ref 0–1.2)
BUN SERPL-MCNC: 20 MG/DL (ref 6–23)
CALCIUM SERPL-MCNC: 11 MG/DL (ref 8.6–10.3)
CHLORIDE SERPL-SCNC: 97 MMOL/L (ref 98–107)
CO2 SERPL-SCNC: 28 MMOL/L (ref 21–32)
CREAT SERPL-MCNC: 1.36 MG/DL (ref 0.5–1.05)
EGFRCR SERPLBLD CKD-EPI 2021: 43 ML/MIN/1.73M*2
EOSINOPHIL # BLD AUTO: 0.25 X10*3/UL (ref 0–0.7)
EOSINOPHIL NFR BLD AUTO: 2.4 %
ERYTHROCYTE [DISTWIDTH] IN BLOOD BY AUTOMATED COUNT: 15.1 % (ref 11.5–14.5)
FERRITIN SERPL-MCNC: 127 NG/ML (ref 8–150)
GLUCOSE SERPL-MCNC: 127 MG/DL (ref 74–99)
HCT VFR BLD AUTO: 46.8 % (ref 36–46)
HGB BLD-MCNC: 15.4 G/DL (ref 12–16)
HGB RETIC QN: 32 PG (ref 28–38)
IMM GRANULOCYTES # BLD AUTO: 0.03 X10*3/UL (ref 0–0.7)
IMM GRANULOCYTES NFR BLD AUTO: 0.3 % (ref 0–0.9)
IMMATURE RETIC FRACTION: 4 %
IRON SATN MFR SERPL: 17 % (ref 25–45)
IRON SERPL-MCNC: 70 UG/DL (ref 35–150)
LYMPHOCYTES # BLD AUTO: 3.25 X10*3/UL (ref 1.2–4.8)
LYMPHOCYTES NFR BLD AUTO: 31.3 %
MCH RBC QN AUTO: 27.5 PG (ref 26–34)
MCHC RBC AUTO-ENTMCNC: 32.9 G/DL (ref 32–36)
MCV RBC AUTO: 84 FL (ref 80–100)
MONOCYTES # BLD AUTO: 0.63 X10*3/UL (ref 0.1–1)
MONOCYTES NFR BLD AUTO: 6.1 %
NEUTROPHILS # BLD AUTO: 6.13 X10*3/UL (ref 1.2–7.7)
NEUTROPHILS NFR BLD AUTO: 59 %
NRBC BLD-RTO: 0 /100 WBCS (ref 0–0)
PLATELET # BLD AUTO: 220 X10*3/UL (ref 150–450)
POTASSIUM SERPL-SCNC: 4.9 MMOL/L (ref 3.5–5.3)
PROT SERPL-MCNC: 7.3 G/DL (ref 6.4–8.2)
RBC # BLD AUTO: 5.6 X10*6/UL (ref 4–5.2)
RETICS #: 0.06 X10*6/UL (ref 0.02–0.08)
RETICS/RBC NFR AUTO: 1.1 % (ref 0.5–2)
SODIUM SERPL-SCNC: 134 MMOL/L (ref 136–145)
TIBC SERPL-MCNC: 407 UG/DL (ref 240–445)
UIBC SERPL-MCNC: 337 UG/DL (ref 110–370)
WBC # BLD AUTO: 10.4 X10*3/UL (ref 4.4–11.3)

## 2025-02-13 PROCEDURE — 80053 COMPREHEN METABOLIC PANEL: CPT

## 2025-02-13 PROCEDURE — 83550 IRON BINDING TEST: CPT

## 2025-02-13 PROCEDURE — 82607 VITAMIN B-12: CPT

## 2025-02-13 PROCEDURE — 83540 ASSAY OF IRON: CPT

## 2025-02-13 PROCEDURE — 85045 AUTOMATED RETICULOCYTE COUNT: CPT

## 2025-02-13 PROCEDURE — 85025 COMPLETE CBC W/AUTO DIFF WBC: CPT

## 2025-02-13 PROCEDURE — 82728 ASSAY OF FERRITIN: CPT

## 2025-02-14 LAB — VIT B12 SERPL-MCNC: 550 PG/ML (ref 211–911)

## 2025-02-17 ENCOUNTER — INFUSION (OUTPATIENT)
Dept: HEMATOLOGY/ONCOLOGY | Facility: CLINIC | Age: 65
End: 2025-02-17
Payer: MEDICARE

## 2025-02-17 ENCOUNTER — OFFICE VISIT (OUTPATIENT)
Dept: HEMATOLOGY/ONCOLOGY | Facility: CLINIC | Age: 65
End: 2025-02-17
Payer: MEDICARE

## 2025-02-17 VITALS
OXYGEN SATURATION: 98 % | BODY MASS INDEX: 31.64 KG/M2 | SYSTOLIC BLOOD PRESSURE: 107 MMHG | HEART RATE: 75 BPM | WEIGHT: 196 LBS | RESPIRATION RATE: 20 BRPM | DIASTOLIC BLOOD PRESSURE: 63 MMHG | TEMPERATURE: 97 F

## 2025-02-17 DIAGNOSIS — E53.8 B12 DEFICIENCY: ICD-10-CM

## 2025-02-17 DIAGNOSIS — E61.1 IRON DEFICIENCY: ICD-10-CM

## 2025-02-17 DIAGNOSIS — D64.9 ANEMIA, UNSPECIFIED TYPE: ICD-10-CM

## 2025-02-17 PROCEDURE — 2500000004 HC RX 250 GENERAL PHARMACY W/ HCPCS (ALT 636 FOR OP/ED)

## 2025-02-17 PROCEDURE — 99214 OFFICE O/P EST MOD 30 MIN: CPT

## 2025-02-17 PROCEDURE — 1126F AMNT PAIN NOTED NONE PRSNT: CPT

## 2025-02-17 PROCEDURE — 3078F DIAST BP <80 MM HG: CPT

## 2025-02-17 PROCEDURE — 96372 THER/PROPH/DIAG INJ SC/IM: CPT

## 2025-02-17 PROCEDURE — 3074F SYST BP LT 130 MM HG: CPT

## 2025-02-17 PROCEDURE — 1159F MED LIST DOCD IN RCRD: CPT

## 2025-02-17 RX ORDER — CYANOCOBALAMIN 1000 UG/ML
INJECTION, SOLUTION INTRAMUSCULAR; SUBCUTANEOUS
Status: COMPLETED
Start: 2025-02-17 | End: 2025-02-17

## 2025-02-17 RX ORDER — ALBUTEROL SULFATE 0.83 MG/ML
3 SOLUTION RESPIRATORY (INHALATION) AS NEEDED
OUTPATIENT
Start: 2025-03-17

## 2025-02-17 RX ORDER — DIPHENHYDRAMINE HYDROCHLORIDE 50 MG/ML
50 INJECTION INTRAMUSCULAR; INTRAVENOUS AS NEEDED
OUTPATIENT
Start: 2025-03-17

## 2025-02-17 RX ORDER — FAMOTIDINE 10 MG/ML
20 INJECTION, SOLUTION INTRAVENOUS ONCE AS NEEDED
OUTPATIENT
Start: 2025-03-17

## 2025-02-17 RX ORDER — CYANOCOBALAMIN 1000 UG/ML
1000 INJECTION, SOLUTION INTRAMUSCULAR; SUBCUTANEOUS ONCE
OUTPATIENT
Start: 2025-03-17

## 2025-02-17 RX ORDER — EPINEPHRINE 0.3 MG/.3ML
0.3 INJECTION SUBCUTANEOUS EVERY 5 MIN PRN
OUTPATIENT
Start: 2025-03-17

## 2025-02-17 RX ORDER — CYANOCOBALAMIN 1000 UG/ML
1000 INJECTION, SOLUTION INTRAMUSCULAR; SUBCUTANEOUS ONCE
Status: COMPLETED | OUTPATIENT
Start: 2025-02-17 | End: 2025-02-17

## 2025-02-17 RX ADMIN — CYANOCOBALAMIN 1000 MCG: 1000 INJECTION, SOLUTION INTRAMUSCULAR; SUBCUTANEOUS at 15:16

## 2025-02-17 ASSESSMENT — PAIN SCALES - GENERAL: PAINLEVEL_OUTOF10: 0-NO PAIN

## 2025-02-17 NOTE — PATIENT INSTRUCTIONS
Discussed and reviewed medical history   Reviewed labs H/H has improved and remains stable  Iron deficiency has corrected/stable   Encouraged to continue on oral iron ([place] vitamins with extra iron) 2 tablets daily  Encouraged to continue on B12 injections monthly   Encouraged to continue to follow with PCP, and GI for chronic conditions/treatments  Does not need to continue to follow with me at this time but am more than happy to see the patient in the future if needed.

## 2025-02-17 NOTE — PROGRESS NOTES
No follow up needed- follow with PCP- pt had Vit B 12 injection today  Pt will ask Dr Stewart at her appt on 3/7-will he do the B12 injections- if she continues [facility] the [facility] she will need more appts scheduled  Reviewed AVS with patient- patient verbalizes understanding

## 2025-02-17 NOTE — PROGRESS NOTES
Patient ID: Ana Melvin is a 65 y.o. female.    Subjective    HPI  HPI  Patient ID: Ana Melvin is a 64 y.o. female.  Referring Physician: Hasmukh Stewart MD  [id number] [place] [address]  Visit Type: Initial Visit     Diagnosis/Reason: Anemia      HPI:  Patient is a 63 yo female with a PMH of COPD, GERD, Paroxysmal SVT, B12  and was referred to benign hematology for consultation of anemia.     Chronic abdominal pain for the past 2 years. Is scheduled to see a GI in July, to schedule for colonoscopy/ EGD. Patient reports she has been dealing with poor dentition, recent had tooth extraction, possible tooth infection completed course of antibiotics.       Today, patient presents for initial consultation. Energy is poor, fatigued very easily. CORONA with any exertion. Bilateral edema in LLE, has been increased (feet, ankles, legs) Left arm to shoulder pain. Severe right hip. Eating less than she use to, consisently feels full, even with out eating. 10-20lbs weight loss over a year. Loose stool with lots of bile. Denies fever, chills. Bruises easily, denies any abnormal bleeding. No recurrent infections or lymphadenopathy. No bone pain. No known blood disorders in family. Has had surgery in past w/o issue.   Episodes of light headed/dizziness. Nails are dry, brittle, hair loss, texture changes.      2/17/25:   Remains to have early satiety and stomach fullness and discomfort/pain. Appetite is better, remains to get full fast. Water consumption has been less recently. Feels as though she is retaining fluids in abdomen. Increased SOB, for approximately 3 weeks ago, taking 2 lasix daily. Occasionally bilateral edema in lower extremities. Non-productive intermittent coughing. Energy remains poor, fatigued a lot. Admits poor sleep hygiene. Denies any vomiting, constipation, diarrhea or nausea. Urinating well. No fevers, or recent infections.    Will be establishing with hepatology at [facility], patient is needing to  schedule. Symptoms are improving since starting on Spirolactone, and lasix to reduce the fluids.      Recently diagnosed with primary bilary cirrhosis, by Dr. Holder, however unaware the severity and treatment. She was prescribed spirolactone, which does seem to be helping minimally with ascites. She is scheduled to see a surgeon to hoping to discuss liver biopsy, cholecystectomy, umbilical hernia repair, and evaluation/treatment of her ascites, by removing some fluid.      EGD- reported normal esophagus, portal hypertensive gastropathy- biopsied, a single gastroesophageal junction polyp was biopsied   Colonoscopy reported- rectal prolapse, diverticulosis in the recto-sigmoid colon, in the sigmoid colon and the descending colon, 14 mm polyp in the ascending colon, congested mucosa in recto-sigmoid colon, surveillance in 5 years.        8/5/24 HIDA Scan reported as abnormal- gallbladder ejection fraction less than 35%- consistent with the presence of  hepatobiliary disease        10/29/24: Patient is overall feeling much better since starting lasix and spirolactone. Her ascites appear to be improving. Bilateral lower leg swelling has decreased. She remains having early satiety and eating small amounts daily. Denies any nausea or vomiting, or abdominal pain. Less shortness of breath with exertion. Denies any abnormal bleeding or bruising. Chronic constipation, patient was prescribed lactulose for management, she admits to no longer taking it. No fevers or signs of infections.      She was seen by surgeon Dr. Looney, for evaluation of cholecystectomy and liver biopsy, at this time both have placed on hold. She is scheduled for consultation with hepatology.      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[employer],   PSH:  Breast Augmentation,   FH:  Half sister- Multiple Myeloma- (same mother)-  dx-2010  Father-  Bladder Cancer  M-Grandmother- Stomach Cancer     Meds: see list      Menstrual Cycle History:  Post Menopausal, denies any vaginal bleeding   No MMG 10+ years and pap  No Skin Cancer Screening      Previous Hematological Background:  Hx of hematological disorders: No - Patient denies prior hematologic history  Hx of blood transfusions: No - Patient denies prior blood transfusion  Hx of iron supplementation: No - Denies any previous supplementation  Hx of B12 supplementation: Yes - Prior oral supplementation - Denies adverse effect and reaction  Hx of folate supplementation: No - Denies any prior supplementation     Relevant medications:  Currently using NSAID's (Naproxen, Ibuprofen etc.):            PMHx:          Active Ambulatory Problems     Diagnosis Date Noted    COPD (chronic obstructive pulmonary disease) (Multi) 04/12/2023    Diabetes mellitus (Multi) 04/12/2023    GERD (gastroesophageal reflux disease) 04/12/2023    Paroxysmal SVT (supraventricular tachycardia) (CMS-[facility]) 04/12/2023    B12 deficiency 05/22/2023    Anemia 05/22/2023    Allergies 05/25/2023    Hematuria 05/25/2023    Low back pain 05/25/2023    Lung nodule 05/25/2023    Nocturia 05/25/2023    Urinary frequency 05/25/2023              Resolved Ambulatory Problems     Diagnosis Date Noted    Epigastric abdominal pain of unknown etiology 04/12/2023    Cough 05/25/2023    COVID-19 05/25/2023    Extreme obesity 05/25/2023    Pleurisy 05/25/2023    Vaginitis due to Candida albicans 05/25/2023              Past Medical History:   Diagnosis Date    Personal history of other medical treatment      Personal history of other medical treatment           PSHx:  Surgical History             Past Surgical History:   Procedure Laterality Date    OTHER SURGICAL HISTORY   02/24/2020     [place] tooth extraction    OTHER SURGICAL HISTORY   02/24/2020     Breast augmentation    OTHER SURGICAL HISTORY   02/24/2020     Cervical biopsy            FHx:  Family  History               Family History   Problem Relation Name Age of Onset    Other (CVA) Mother        Coronary artery disease Mother        Heart attack Father        Other (BLADDER CANCER) Father        Multiple myeloma Sister        Heart attack Brother        Kidney failure Brother                   Social Hx:  Ana Melvin    reports that she has been smoking cigarettes. She has never used smokeless tobacco.  She  reports that she does not currently use alcohol.  She Drug use questions deferred to the physician.  Social History   Social History                Socioeconomic History    Marital status:        Spouse name: Not on file    Number of children: Not on file    Years of education: Not on file    Highest education level: Not on file   Occupational History    Not on file   Tobacco Use    Smoking status: Every Day       Types: Cigarettes    Smokeless tobacco: Never   Vaping Use    Vaping status: Never Used   Substance and Sexual Activity    Alcohol use: Not Currently    Drug use: Defer    Sexual activity: Defer   Other Topics Concern    Not on file   Social History Narrative    Not on file      Social Determinants of Health      Financial Resource Strain: Not on file   Food Insecurity: Not on file   Transportation Needs: Not on file   Physical Activity: Not on file   Stress: Not on file   Social Connections: Not on file   Intimate Partner Violence: Not on file   Housing Stability: Not on file             Medications and allergies reviewed in EMR.          Objective    BSA: There is no height or weight on file to calculate BSA.  There were no vitals taken for this visit.     Physical Exam  Vitals and nursing note reviewed.   Constitutional:       Appearance: Normal appearance.   HENT:      Head: Normocephalic and atraumatic.      Nose: Nose normal.      Mouth/Throat:      Mouth: Mucous membranes are moist.      Pharynx: Oropharynx is clear.   Eyes:      General: No scleral icterus.     Extraocular  Movements: Extraocular movements intact.      Conjunctiva/sclera: Conjunctivae normal.   Cardiovascular:      Rate and Rhythm: Normal rate and regular rhythm.      Pulses: Normal pulses.      Heart sounds: Normal heart sounds.   Pulmonary:      Effort: Pulmonary effort is normal.      Breath sounds: Normal breath sounds.   Abdominal:      General: Bowel sounds are normal.      Palpations: Abdomen is soft.      Tenderness: There is no abdominal tenderness.   Musculoskeletal:         General: Normal range of motion.   Skin:     General: Skin is warm and dry.   Neurological:      General: No focal deficit present.      Mental Status: She is alert and oriented to person, place, and time.   Psychiatric:         Mood and Affect: Mood normal.         Behavior: Behavior normal.         Thought Content: Thought content normal.         Judgment: Judgment normal.         Performance Status:  Symptomatic; fully ambulatory      Assessment/Plan      Anemia  According to our records patient has been anemic since 2022 with a Hg ranging from 9.9-13.3, remainder of major bloodlines have been stable. Iron parameters were reported low in 6/3/24: Fe 16, Fe sat 4%, Ferritin 14, B12 226. Patient has been dealing with dental infections, was treated previously with antibiotics, not currently taking any antibiotics. Denies any abnormal bleeding, bruises easily. Multiple arthralgias/myalgias. Poor energy. Shortness of breath with activity/exertion. Eating less than normal, early satiety occasional. Weight loss of 10-20lbs over a year time frame. Denies any B symptoms.       Discussed oral vs IV replacement of Iron. Recommended IV iron as patient has GI issues currently and is significantly iron deficient with a Fe sat of 4%. Will plan for course of IV iron to administered. Patient is B12 deficient will order administration of IM B12 weekly x 4. Will continue to monitor patients labs closely.      Discussed possible etiologies including  multifactorial, nutritional deficiencies, anemia of chronic disease, malabsorption, hemopathy, medications, bleeding, malignancy, etc. Will start hematological workup today.      9/3/24:   Patient is in office post infusion treatment completed 7/9/24. Labs show slight improvement- Hg 12.6, Fe 44, Fe Sat 11%, Ferritin 66, B12 357. Discussed anemia is likely multifactorial liver cirrhosis, inflammatory processes, malnutrition/malabsorption. Discussed starting oral iron every other day, if able to tolerate given her abdominal fullness/pain.       Encouraged patient to make follow-up appt with GI to discus evaluation/treatment of bilary cirrhosis. Patient is scheduled for consultation with surgeon to discuss cholecystomy, umbilical hernia repair, liver biopsy and ascites treatment. Encouraged patient to keep our office posted, in the event pt is unable to proceed with treatment, discussed consideration of repeat CT scan and MRI to determine extent of liver lesion and possible biopsy, consideration for referral for paraesthesias for comfort measures, discussed the risk factors associated with procedure.      Regardless will continue to monitor patient and labs closely.      2/17/25:  Patient remains fatigue, stable able to complete ADLs. Denies any bleeding or bruising issues. Continues to have mild early satiety. Reports feeling bloated/full of fluids over the past month, but nothing like it was. Continues on oral diuretics (lasix/spirolactone).  She is schedule to see Hepatology for follow-up in April.     She was seen by GI for opinion on possible cholecystectomy, patient is asymptomatic as this time, therefore GI recommends to hold off on any surgical procedures if she remains asymptomatic.      Reviewed labs:  2/13/25:   Hemoglobin 15.4, WBC 10.4, Plts 220K,   Iron Parameters- Iron Serum 70, Iron Saturation 17%, Ferritin 127, B12 550  Creat 1.36, BUN 20, GFR 43, Calcium 11.0    Anemia has corrected with a hemoglobin  of 15.4, remainder of CBC is stable. Iron parameters remain stable. Encouraged to continue on oral [place] Vitamins w/iron, and B12 daily. Discussed her nutritional deficiencies are likely secondary to her cirrhosis. Calcium remains elevated at 11.0. Discussed continuation of care with PCP.  Does not need to continue to follow with me at this time but am more than happy to see the patient in the future if needed.      2. Atypical Mole:   Patient has a mole on her chest in which patient verbalized concerns due recent changes in appearance. Patient denies have a skin cancer screening performed in her life time. Discussed referral to dermatology for skin cancer screening and evaluation of atypical mole on her chest.      3. Liver Chirosis   -scheduled to see Hepatology  - taking spirolactone/lasix and lactulose  - discussed administration of IV iron     4. CKD  -managed by nephrology  Base line Creat 1.11, BUN 15, GFR 55  2/13/25: Creat 1.36, BUN 20, GFR 43     Plan:  Discussed and reviewed medical history   Reviewed labs H/H has improved and remains stable  Iron deficiency has corrected/stable   Encouraged to continue on oral iron (Dun[place] vitamins with extra iron) 2 tablets daily  Encouraged to continue on B12 injections monthly   Encouraged to continue to follow with PCP, and GI for chronic conditions/treatments  Does not need to continue to follow with me at this time but am more than happy to see the patient in the future if needed.      I had an extensive discussion with the patient regarding the diagnosis and discussed the plan of therapy, including general considerations regarding side effects and outcomes. Pt understood and gave appropriate teach back about the plan of care. All questions were answered to the patient's satisfaction. The patient is instructed to contact us at any time if questions or problems arise. Thank you for the opportunity to participate in the care of this very pleasant patient.      Total time = 30 minutes. 50% or more of this time was spent in counseling and/or coordination of care including reviewing medical history/radiology/labs, examining patient, formulating outlined plan with team, and discussing plan with patient/family.             Michelle Israel, APRN-CNP

## 2025-03-03 DIAGNOSIS — I47.10 PAROXYSMAL SVT (SUPRAVENTRICULAR TACHYCARDIA) (CMS-HCC): ICD-10-CM

## 2025-03-03 RX ORDER — FUROSEMIDE 40 MG/1
40 TABLET ORAL
Qty: 180 TABLET | Refills: 3 | Status: SHIPPED | OUTPATIENT
Start: 2025-03-03 | End: 2026-03-03

## 2025-03-05 LAB
CHOLEST SERPL-MCNC: 259 MG/DL
CHOLEST/HDLC SERPL: 7.8 (CALC)
HDLC SERPL-MCNC: 33 MG/DL
LDLC SERPL CALC-MCNC: 179 MG/DL (CALC)
NONHDLC SERPL-MCNC: 226 MG/DL (CALC)
TRIGL SERPL-MCNC: 263 MG/DL

## 2025-03-07 ENCOUNTER — HOSPITAL ENCOUNTER (OUTPATIENT)
Dept: RADIOLOGY | Facility: CLINIC | Age: 65
Discharge: HOME | End: 2025-03-07
Payer: MEDICARE

## 2025-03-07 ENCOUNTER — APPOINTMENT (OUTPATIENT)
Age: 65
End: 2025-03-07
Payer: MEDICARE

## 2025-03-07 VITALS
SYSTOLIC BLOOD PRESSURE: 120 MMHG | WEIGHT: 194 LBS | HEART RATE: 74 BPM | HEIGHT: 66 IN | OXYGEN SATURATION: 94 % | DIASTOLIC BLOOD PRESSURE: 70 MMHG | BODY MASS INDEX: 31.18 KG/M2

## 2025-03-07 DIAGNOSIS — I47.10 PAROXYSMAL SVT (SUPRAVENTRICULAR TACHYCARDIA) (CMS-HCC): ICD-10-CM

## 2025-03-07 DIAGNOSIS — D64.9 ANEMIA, UNSPECIFIED TYPE: ICD-10-CM

## 2025-03-07 DIAGNOSIS — Z72.0 TOBACCO ABUSE: ICD-10-CM

## 2025-03-07 DIAGNOSIS — K74.60 HEPATIC CIRRHOSIS, UNSPECIFIED HEPATIC CIRRHOSIS TYPE, UNSPECIFIED WHETHER ASCITES PRESENT (MULTI): ICD-10-CM

## 2025-03-07 DIAGNOSIS — R06.02 SOB (SHORTNESS OF BREATH): ICD-10-CM

## 2025-03-07 DIAGNOSIS — E78.2 MIXED HYPERLIPIDEMIA: ICD-10-CM

## 2025-03-07 DIAGNOSIS — Z00.00 ROUTINE GENERAL MEDICAL EXAMINATION AT HEALTH CARE FACILITY: Primary | ICD-10-CM

## 2025-03-07 DIAGNOSIS — E11.9 TYPE 2 DIABETES MELLITUS WITHOUT COMPLICATION, UNSPECIFIED WHETHER LONG TERM INSULIN USE (MULTI): ICD-10-CM

## 2025-03-07 DIAGNOSIS — K21.9 GASTROESOPHAGEAL REFLUX DISEASE, UNSPECIFIED WHETHER ESOPHAGITIS PRESENT: ICD-10-CM

## 2025-03-07 DIAGNOSIS — T78.40XS ALLERGY, SEQUELA: ICD-10-CM

## 2025-03-07 DIAGNOSIS — E53.8 B12 DEFICIENCY: ICD-10-CM

## 2025-03-07 DIAGNOSIS — E87.6 HYPOKALEMIA: ICD-10-CM

## 2025-03-07 DIAGNOSIS — K76.6 PORTAL HYPERTENSION (MULTI): ICD-10-CM

## 2025-03-07 DIAGNOSIS — J43.2 CENTRILOBULAR EMPHYSEMA (MULTI): ICD-10-CM

## 2025-03-07 PROCEDURE — 71046 X-RAY EXAM CHEST 2 VIEWS: CPT

## 2025-03-07 RX ORDER — FEXOFENADINE HCL AND PSEUDOEPHEDRINE HCI 60; 120 MG/1; MG/1
1 TABLET, EXTENDED RELEASE ORAL EVERY 12 HOURS
Qty: 180 TABLET | Refills: 3 | Status: SHIPPED | OUTPATIENT
Start: 2025-03-07 | End: 2026-03-07

## 2025-03-07 RX ORDER — SPIRONOLACTONE 25 MG/1
50 TABLET ORAL DAILY
Qty: 60 TABLET | Refills: 11 | Status: SHIPPED | OUTPATIENT
Start: 2025-03-07 | End: 2026-03-07

## 2025-03-07 ASSESSMENT — PATIENT HEALTH QUESTIONNAIRE - PHQ9
1. LITTLE INTEREST OR PLEASURE IN DOING THINGS: NOT AT ALL
SUM OF ALL RESPONSES TO PHQ9 QUESTIONS 1 AND 2: 0
2. FEELING DOWN, DEPRESSED OR HOPELESS: NOT AT ALL

## 2025-03-07 ASSESSMENT — ENCOUNTER SYMPTOMS
DEPRESSION: 0
OCCASIONAL FEELINGS OF UNSTEADINESS: 0
LOSS OF SENSATION IN FEET: 0

## 2025-03-07 ASSESSMENT — ACTIVITIES OF DAILY LIVING (ADL)
TAKING_MEDICATION: INDEPENDENT
BATHING: INDEPENDENT
DRESSING: INDEPENDENT
MANAGING_FINANCES: INDEPENDENT
DOING_HOUSEWORK: INDEPENDENT
GROCERY_SHOPPING: INDEPENDENT

## 2025-03-07 NOTE — ASSESSMENT & PLAN NOTE
Is currently seeing gastroenterology and hepatology, had liver biopsy done in November that was consistent with fat congestion through the liver.  Will check CBC and CMP, minimal swelling currently in the ankles but is complaining of some increasing shortness of breath and swelling around her midsection.  Will check a chest x-ray due to the shortness of breath along with increased Lasix to 40 mg 3 times a day and recheck again in the next week.  Patient is already taking 50 mg of spironolactone.  Patient also does not appear to have hepatitis B immunity, may benefit from vaccines.  Orders:    Follow Up In Primary Care - Established    Follow Up In Primary Care - Established; Future    spironolactone (Aldactone) 25 mg tablet; Take 2 tablets (50 mg) by mouth once daily.

## 2025-03-07 NOTE — ASSESSMENT & PLAN NOTE
Needs to quit smoking, continue with current inhaler therapy, check chest x-ray due to shortness of breath.  Orders:    Follow Up In Primary Care - Established    Follow Up In Primary Care - Established; Future

## 2025-03-07 NOTE — PROGRESS NOTES
"Subjective   Reason for Visit: Ana Melvin is an 65 y.o. female here for a Medicare Wellness visit.     Past Medical, Surgical, and Family History reviewed and updated in chart.    Reviewed all medications by prescribing practitioner or clinical pharmacist (such as prescriptions, OTCs, herbal therapies and supplements) and documented in the medical record.    HPI  No low blood sugars since last OV, seen opthalmology in the past year, and no numbness or tingling in feet, skin normal.  No headache, chest pain, shortness of breath, dizziness, lightheadedness, or edema  Seen hematology NP in the past month, has had Fe infusions in the past  Had liver biopsy at [facility] in [place], GI prescribed Actigall  Diagnosed with primary biliary cirrhosis  To see GI next month, some bloating and fullness in abdomen  Weight stable, some SOB at times, increased swelling in ankles  Using albuterol more, some coughing at times, no sputum, occ wheezing, some increase in CORONA, past 2 weeks, some nasal congestion  Started taking furosemide twice a day 2 weeks ago  BS OK, occ LBS (has only happened a few times)  50 mg aldactone upset stomach, has been taking 50 mg spironolactone since November    Patient Care Team:  Hasmukh Stewart MD as PCP - General (Family Medicine)  Hasmukh Stewart MD as PCP - [employer]age PCP  Hasmukh Stewart MD as PCP - [employer]vantage PCP     Review of Systems    Objective   Vitals:  /70   Pulse 74   Ht 1.676 m (5' 6\")   Wt 88 kg (194 lb)   SpO2 94%   BMI 31.31 kg/m²       Physical Exam    Assessment & Plan  Centrilobular emphysema (Multi)  Needs to quit smoking, continue with current inhaler therapy, check chest x-ray due to shortness of breath.  Orders:    Follow Up In Primary Care - Established    Follow Up In Primary Care - Established; Future    Paroxysmal SVT (supraventricular tachycardia) (CMS-HCC)  Continue with metoprolol.  Orders:    Follow Up In Primary Care - " Established    Follow Up In Primary Care - Established; Future    Type 2 diabetes mellitus without complication, unspecified whether long term insulin use (Multi)  Need to get blood testing to check A1c, missed on recent blood test.  Orders:    Follow Up In [facility]    Follow Up In Primary Care - Established; Future    Comprehensive Metabolic Panel; Future    Hemoglobin A1C; Future    Gastroesophageal reflux disease, unspecified whether esophagitis present  Continue with omeprazole.  May need to have EGD due to cirrhosis if not already done to screen for varices.  Orders:    Follow Up In [facility]    Follow Up In Primary Care - Established; Future    Hypokalemia    Orders:    Follow Up In [facility]    Follow Up In Primary Care - Established; Future    B12 deficiency  Continue with injectable replacement, check labs.  Orders:    Follow Up In [facility]    Follow Up In Primary Care - Established; Future    Anemia, unspecified type  Had iron infusion in the past, ferritin in February was stable, hemoglobin stable at 15.4.  Orders:    Follow Up In [facility]    Follow Up In Primary Care - Established; Future    CBC and Auto Differential; Future    Comprehensive Metabolic Panel; Future    Tobacco abuse    Orders:    Follow Up In [facility]    Follow Up In Primary Care - Established; Future    Hepatic cirrhosis, unspecified hepatic cirrhosis type, unspecified whether ascites present (Multi)  Is currently seeing gastroenterology and hepatology, had liver biopsy done in November that was consistent with fat congestion through the liver.  Will check CBC and CMP, minimal swelling currently in the ankles but is complaining of some increasing shortness of breath and swelling around her midsection.  Will check a chest x-ray due to the shortness of breath along with increased Lasix to 40 mg 3 times a day and recheck again in the  next week.  Patient is already taking 50 mg of spironolactone.  Patient also does not appear to have hepatitis B immunity, may benefit from vaccines.  Orders:    Follow Up In Primary Care - Established    Follow Up In Primary Care - Established; Future    spironolactone (Aldactone) 25 mg tablet; Take 2 tablets (50 mg) by mouth once daily.    Routine general medical examination at health care facility    Orders:    Follow Up In Primary Care - Established; Future    Allergy, sequela    Orders:    fexofenadine-pseudoephedrine (Allegra-D 12 Hour)  mg 12 hr tablet; Take 1 tablet by mouth every 12 hours.    Follow Up In Primary Care - Established; Future    Mixed hyperlipidemia  May benefit from statin therapy given nonalcoholic steatohepatitis diagnosis.  Orders:    Follow Up In Primary Care - Established; Future    SOB (shortness of breath)    Orders:    CBC and Auto Differential; Future    Comprehensive Metabolic Panel; Future    B-type natriuretic peptide; Future    XR chest 2 views; Future    Portal hypertension (Multi)  Being followed by hepatology, increase diuretics and recheck in 1 week, may benefit from EGD to screen for varices.

## 2025-03-07 NOTE — ASSESSMENT & PLAN NOTE
Need to get blood testing to check A1c, missed on recent blood test.  Orders:    Follow Up In Primary Care - Established    Follow Up In Primary Care - Established; Future    Comprehensive Metabolic Panel; Future    Hemoglobin A1C; Future

## 2025-03-07 NOTE — ASSESSMENT & PLAN NOTE
Continue with metoprolol.  Orders:    Follow Up In Primary Care - Established    Follow Up In Primary Care - Established; Future

## 2025-03-07 NOTE — ASSESSMENT & PLAN NOTE
May benefit from statin therapy given nonalcoholic steatohepatitis diagnosis.  Orders:    Follow Up In Primary Care - Established; Future

## 2025-03-07 NOTE — ASSESSMENT & PLAN NOTE
Had iron infusion in the past, ferritin in February was stable, hemoglobin stable at 15.4.  Orders:    Follow Up In Primary Care - Established    Follow Up In Primary Care - Established; Future    CBC and Auto Differential; Future    Comprehensive Metabolic Panel; Future

## 2025-03-07 NOTE — ASSESSMENT & PLAN NOTE
Continue with omeprazole.  May need to have EGD due to cirrhosis if not already done to screen for varices.  Orders:    Follow Up In Primary Care - Established    Follow Up In Primary Care - Established; Future

## 2025-03-07 NOTE — ASSESSMENT & PLAN NOTE
Continue with injectable replacement, check labs.  Orders:    Follow Up In Primary Care - Established    Follow Up In Primary Care - Established; Future

## 2025-03-07 NOTE — ASSESSMENT & PLAN NOTE
Being followed by hepatology, increase diuretics and recheck in 1 week, may benefit from EGD to screen for varices.

## 2025-03-07 NOTE — ASSESSMENT & PLAN NOTE
Orders:    fexofenadine-pseudoephedrine (Allegra-D 12 Hour)  mg 12 hr tablet; Take 1 tablet by mouth every 12 hours.    Follow Up In Primary Care - Established; Future

## 2025-03-09 LAB
ALBUMIN SERPL-MCNC: 4.3 G/DL (ref 3.6–5.1)
ALP SERPL-CCNC: 90 U/L (ref 37–153)
ALT SERPL-CCNC: 16 U/L (ref 6–29)
ANION GAP SERPL CALCULATED.4IONS-SCNC: 10 MMOL/L (CALC) (ref 7–17)
AST SERPL-CCNC: 15 U/L (ref 10–35)
BASOPHILS # BLD AUTO: 78 CELLS/UL (ref 0–200)
BASOPHILS NFR BLD AUTO: 1 %
BILIRUB SERPL-MCNC: 0.6 MG/DL (ref 0.2–1.2)
BNP SERPL-MCNC: NORMAL PG/ML
BUN SERPL-MCNC: 21 MG/DL (ref 7–25)
CALCIUM SERPL-MCNC: 10.6 MG/DL (ref 8.6–10.4)
CHLORIDE SERPL-SCNC: 99 MMOL/L (ref 98–110)
CO2 SERPL-SCNC: 28 MMOL/L (ref 20–32)
CREAT SERPL-MCNC: 1.38 MG/DL (ref 0.5–1.05)
EGFRCR SERPLBLD CKD-EPI 2021: 42 ML/MIN/1.73M2
EOSINOPHIL # BLD AUTO: 179 CELLS/UL (ref 15–500)
EOSINOPHIL NFR BLD AUTO: 2.3 %
ERYTHROCYTE [DISTWIDTH] IN BLOOD BY AUTOMATED COUNT: 13.2 % (ref 11–15)
EST. AVERAGE GLUCOSE BLD GHB EST-MCNC: 163 MG/DL
EST. AVERAGE GLUCOSE BLD GHB EST-SCNC: 9 MMOL/L
GLUCOSE SERPL-MCNC: 133 MG/DL (ref 65–99)
HBA1C MFR BLD: 7.3 % OF TOTAL HGB
HCT VFR BLD AUTO: 44.2 % (ref 35–45)
HGB BLD-MCNC: 15.1 G/DL (ref 11.7–15.5)
LYMPHOCYTES # BLD AUTO: 2379 CELLS/UL (ref 850–3900)
LYMPHOCYTES NFR BLD AUTO: 30.5 %
MCH RBC QN AUTO: 28.7 PG (ref 27–33)
MCHC RBC AUTO-ENTMCNC: 34.2 G/DL (ref 32–36)
MCV RBC AUTO: 84 FL (ref 80–100)
MONOCYTES # BLD AUTO: 569 CELLS/UL (ref 200–950)
MONOCYTES NFR BLD AUTO: 7.3 %
NEUTROPHILS # BLD AUTO: 4594 CELLS/UL (ref 1500–7800)
NEUTROPHILS NFR BLD AUTO: 58.9 %
PLATELET # BLD AUTO: 205 THOUSAND/UL (ref 140–400)
PMV BLD REES-ECKER: 10.1 FL (ref 7.5–12.5)
POTASSIUM SERPL-SCNC: 4.8 MMOL/L (ref 3.5–5.3)
PROT SERPL-MCNC: 7.2 G/DL (ref 6.1–8.1)
RBC # BLD AUTO: 5.26 MILLION/UL (ref 3.8–5.1)
SODIUM SERPL-SCNC: 137 MMOL/L (ref 135–146)
WBC # BLD AUTO: 7.8 THOUSAND/UL (ref 3.8–10.8)

## 2025-03-10 LAB
ALBUMIN SERPL-MCNC: 4.3 G/DL (ref 3.6–5.1)
ALP SERPL-CCNC: 90 U/L (ref 37–153)
ALT SERPL-CCNC: 16 U/L (ref 6–29)
ANION GAP SERPL CALCULATED.4IONS-SCNC: 10 MMOL/L (CALC) (ref 7–17)
AST SERPL-CCNC: 15 U/L (ref 10–35)
BASOPHILS # BLD AUTO: 78 CELLS/UL (ref 0–200)
BASOPHILS NFR BLD AUTO: 1 %
BILIRUB SERPL-MCNC: 0.6 MG/DL (ref 0.2–1.2)
BNP SERPL-MCNC: 21 PG/ML
BUN SERPL-MCNC: 21 MG/DL (ref 7–25)
CALCIUM SERPL-MCNC: 10.6 MG/DL (ref 8.6–10.4)
CHLORIDE SERPL-SCNC: 99 MMOL/L (ref 98–110)
CO2 SERPL-SCNC: 28 MMOL/L (ref 20–32)
CREAT SERPL-MCNC: 1.38 MG/DL (ref 0.5–1.05)
EGFRCR SERPLBLD CKD-EPI 2021: 42 ML/MIN/1.73M2
EOSINOPHIL # BLD AUTO: 179 CELLS/UL (ref 15–500)
EOSINOPHIL NFR BLD AUTO: 2.3 %
ERYTHROCYTE [DISTWIDTH] IN BLOOD BY AUTOMATED COUNT: 13.2 % (ref 11–15)
EST. AVERAGE GLUCOSE BLD GHB EST-MCNC: 163 MG/DL
EST. AVERAGE GLUCOSE BLD GHB EST-SCNC: 9 MMOL/L
GLUCOSE SERPL-MCNC: 133 MG/DL (ref 65–99)
HBA1C MFR BLD: 7.3 % OF TOTAL HGB
HCT VFR BLD AUTO: 44.2 % (ref 35–45)
HGB BLD-MCNC: 15.1 G/DL (ref 11.7–15.5)
LYMPHOCYTES # BLD AUTO: 2379 CELLS/UL (ref 850–3900)
LYMPHOCYTES NFR BLD AUTO: 30.5 %
MCH RBC QN AUTO: 28.7 PG (ref 27–33)
MCHC RBC AUTO-ENTMCNC: 34.2 G/DL (ref 32–36)
MCV RBC AUTO: 84 FL (ref 80–100)
MONOCYTES # BLD AUTO: 569 CELLS/UL (ref 200–950)
MONOCYTES NFR BLD AUTO: 7.3 %
NEUTROPHILS # BLD AUTO: 4594 CELLS/UL (ref 1500–7800)
NEUTROPHILS NFR BLD AUTO: 58.9 %
PLATELET # BLD AUTO: 205 THOUSAND/UL (ref 140–400)
PMV BLD REES-ECKER: 10.1 FL (ref 7.5–12.5)
POTASSIUM SERPL-SCNC: 4.8 MMOL/L (ref 3.5–5.3)
PROT SERPL-MCNC: 7.2 G/DL (ref 6.1–8.1)
RBC # BLD AUTO: 5.26 MILLION/UL (ref 3.8–5.1)
SODIUM SERPL-SCNC: 137 MMOL/L (ref 135–146)
WBC # BLD AUTO: 7.8 THOUSAND/UL (ref 3.8–10.8)

## 2025-03-14 ENCOUNTER — APPOINTMENT (OUTPATIENT)
Age: 65
End: 2025-03-14
Payer: MEDICARE

## 2025-03-14 VITALS
HEART RATE: 67 BPM | HEIGHT: 66 IN | WEIGHT: 193.4 LBS | OXYGEN SATURATION: 96 % | SYSTOLIC BLOOD PRESSURE: 100 MMHG | BODY MASS INDEX: 31.08 KG/M2 | DIASTOLIC BLOOD PRESSURE: 60 MMHG

## 2025-03-14 DIAGNOSIS — T78.40XS ALLERGY, SEQUELA: ICD-10-CM

## 2025-03-14 DIAGNOSIS — K74.60 HEPATIC CIRRHOSIS, UNSPECIFIED HEPATIC CIRRHOSIS TYPE, UNSPECIFIED WHETHER ASCITES PRESENT (MULTI): ICD-10-CM

## 2025-03-14 DIAGNOSIS — E53.8 B12 DEFICIENCY: ICD-10-CM

## 2025-03-14 DIAGNOSIS — Z72.0 TOBACCO ABUSE: ICD-10-CM

## 2025-03-14 DIAGNOSIS — E78.2 MIXED HYPERLIPIDEMIA: ICD-10-CM

## 2025-03-14 DIAGNOSIS — E11.9 TYPE 2 DIABETES MELLITUS WITHOUT COMPLICATION, UNSPECIFIED WHETHER LONG TERM INSULIN USE (MULTI): ICD-10-CM

## 2025-03-14 DIAGNOSIS — I47.10 PAROXYSMAL SVT (SUPRAVENTRICULAR TACHYCARDIA) (CMS-HCC): ICD-10-CM

## 2025-03-14 DIAGNOSIS — E87.6 HYPOKALEMIA: ICD-10-CM

## 2025-03-14 DIAGNOSIS — Z00.00 ROUTINE GENERAL MEDICAL EXAMINATION AT HEALTH CARE FACILITY: ICD-10-CM

## 2025-03-14 DIAGNOSIS — D64.9 ANEMIA, UNSPECIFIED TYPE: ICD-10-CM

## 2025-03-14 DIAGNOSIS — K21.9 GASTROESOPHAGEAL REFLUX DISEASE, UNSPECIFIED WHETHER ESOPHAGITIS PRESENT: ICD-10-CM

## 2025-03-14 DIAGNOSIS — J43.2 CENTRILOBULAR EMPHYSEMA (MULTI): ICD-10-CM

## 2025-03-14 RX ORDER — FUROSEMIDE 40 MG/1
40 TABLET ORAL
Qty: 270 TABLET | Refills: 3 | Status: SHIPPED | OUTPATIENT
Start: 2025-03-14 | End: 2026-03-14

## 2025-03-14 RX ORDER — ROSUVASTATIN CALCIUM 10 MG/1
10 TABLET, COATED ORAL DAILY
Qty: 30 TABLET | Refills: 11 | Status: SHIPPED | OUTPATIENT
Start: 2025-03-14 | End: 2026-03-14

## 2025-03-14 ASSESSMENT — ENCOUNTER SYMPTOMS
CONSTIPATION: 0
VOMITING: 0
ABDOMINAL DISTENTION: 0
PALPITATIONS: 0
ADENOPATHY: 0
SHORTNESS OF BREATH: 1
ARTHRALGIAS: 0
UNEXPECTED WEIGHT CHANGE: 0
TROUBLE SWALLOWING: 0
FATIGUE: 0
ABDOMINAL PAIN: 0
COUGH: 0
APPETITE CHANGE: 0
HEADACHES: 0
DIARRHEA: 0
LIGHT-HEADEDNESS: 0
RHINORRHEA: 0
WHEEZING: 0
ACTIVITY CHANGE: 0
DIFFICULTY URINATING: 0
NAUSEA: 0
DIZZINESS: 0
NUMBNESS: 0

## 2025-03-14 NOTE — ASSESSMENT & PLAN NOTE
Start 10 mg rosuvastatin, recheck laboratory testing in 3 months.  Patient advised that this is important given her cardiac risk due to her diabetes, may also be helpful for fat congestion in her liver.  Will monitor liver function testing carefully.

## 2025-03-14 NOTE — ASSESSMENT & PLAN NOTE
Continue with current medications, still slightly short of breath most likely from COPD, laboratory testing did not suggest congestive heart failure, she seems to be doing a little bit better taking furosemide 3 times a day, check electrolyte and renal functions.

## 2025-03-14 NOTE — ASSESSMENT & PLAN NOTE
Following with hepatology through [facility], also sees gastroenterology at [facility].

## 2025-03-14 NOTE — ASSESSMENT & PLAN NOTE
A1c testing 7.3, question whether or not the patient might benefit from GLP-1 therapy and try to eliminate glimepiride if possible, continue with metformin.

## 2025-03-14 NOTE — PROGRESS NOTES
"Subjective   Patient ID: Ana Melvin is a 65 y.o. female who presents for 1 WK [facility].    HPI   No change since last seen, taking furosemide 3 times a day with some help    Review of Systems   Constitutional:  Negative for activity change, appetite change, fatigue and unexpected weight change.   HENT:  Negative for ear pain, nosebleeds, rhinorrhea, sneezing and trouble swallowing.    Respiratory:  Positive for shortness of breath. Negative for cough and wheezing.    Cardiovascular:  Negative for chest pain, palpitations and leg swelling.   Gastrointestinal:  Negative for abdominal distention, abdominal pain, constipation, diarrhea, nausea and vomiting.   Genitourinary:  Negative for difficulty urinating.   Musculoskeletal:  Negative for arthralgias.   Skin:  Negative for rash.   Neurological:  Negative for dizziness, light-headedness, numbness and headaches.   Hematological:  Negative for adenopathy.   Psychiatric/Behavioral:  Negative for behavioral problems.    All other systems reviewed and are negative.      Objective   /60   Pulse 67   Ht 1.676 m (5' 6\")   Wt 87.7 kg (193 lb 6.4 oz)   SpO2 96%   BMI 31.22 kg/m²     Physical Exam  Vitals and nursing note reviewed.   Constitutional:       General: She is not in acute distress.     Appearance: Normal appearance. She is not toxic-appearing.   HENT:      Head: Normocephalic and atraumatic.      Right Ear: Tympanic membrane, ear canal and external ear normal.      Left Ear: Tympanic membrane, ear canal and external ear normal.      Nose: Nose normal.      Mouth/Throat:      Mouth: Mucous membranes are moist.      Pharynx: Oropharynx is clear.   Eyes:      Extraocular Movements: Extraocular movements intact.      Conjunctiva/sclera: Conjunctivae normal.      Pupils: Pupils are equal, round, and reactive to light.   Cardiovascular:      Rate and Rhythm: Normal rate and regular rhythm.      Pulses: Normal pulses.      Heart sounds: Normal heart sounds. "   Pulmonary:      Effort: Pulmonary effort is normal.      Breath sounds: Normal breath sounds.   Abdominal:      General: Abdomen is flat. Bowel sounds are normal.      Palpations: Abdomen is soft.   Musculoskeletal:      Cervical back: Normal range of motion and neck supple.   Skin:     General: Skin is warm and dry.      Capillary Refill: Capillary refill takes less than 2 seconds.   Neurological:      General: No focal deficit present.      Mental Status: She is alert and oriented to person, place, and time. Mental status is at baseline.   Psychiatric:         Mood and Affect: Mood normal.         Behavior: Behavior normal.         Assessment/Plan   Problem List Items Addressed This Visit             ICD-10-CM    COPD (chronic obstructive pulmonary disease) (Multi) J44.9     Again strongly encouraged to quit smoking, continue with current COPD therapy.         Relevant Orders    Follow Up In Primary Care - Established    Diabetes mellitus (Multi) E11.9     A1c testing 7.3, question whether or not the patient might benefit from GLP-1 therapy and try to eliminate glimepiride if possible, continue with metformin.         Relevant Orders    Follow Up In Primary Care - Established    Comprehensive Metabolic Panel    Hemoglobin A1C    Cholesterol, LDL Direct    TSH with reflex to Free T4 if abnormal    GERD (gastroesophageal reflux disease) K21.9     Stable on PPI, EGD up-to-date.         Relevant Orders    Follow Up In Primary Care - Established    Paroxysmal SVT (supraventricular tachycardia) (CMS-HCC) I47.10    Relevant Medications    furosemide (Lasix) 40 mg tablet    Other Relevant Orders    Follow Up In Primary Care - Established    B12 deficiency E53.8    Relevant Orders    Follow Up In Primary Care - Established    Anemia D64.9     Hemoglobin counts appear to be stable and above 12.         Relevant Orders    Follow Up In Primary Care - Established    CBC and Auto Differential    Allergies T78.40XA    Relevant  Orders    Follow Up In Primary Care - Established    Hepatic cirrhosis (Multi) K74.60     Following with hepatology through [facility], also sees gastroenterology at [facility].         Relevant Orders    Follow Up In Primary Care - Established    Comprehensive Metabolic Panel    CBC and Auto Differential    Mixed hyperlipidemia E78.2     Start 10 mg rosuvastatin, recheck laboratory testing in 3 months.  Patient advised that this is important given her cardiac risk due to her diabetes, may also be helpful for fat congestion in her liver.  Will monitor liver function testing carefully.         Relevant Medications    rosuvastatin (Crestor) 10 mg tablet    Other Relevant Orders    Follow Up In Primary Care - Established    Comprehensive Metabolic Panel    Cholesterol, LDL Direct     Other Visit Diagnoses         Codes    Hypokalemia     E87.6    Relevant Orders    Follow Up In Primary Care - Established    Tobacco abuse     Z72.0    Relevant Orders    Follow Up In Primary Care - Established    Routine general medical examination at health care facility     Z00.00    Relevant Orders    Follow Up In Primary Care - Established

## 2025-03-17 ENCOUNTER — INFUSION (OUTPATIENT)
Dept: HEMATOLOGY/ONCOLOGY | Facility: CLINIC | Age: 65
End: 2025-03-17
Payer: MEDICARE

## 2025-03-17 VITALS
WEIGHT: 195.11 LBS | OXYGEN SATURATION: 98 % | RESPIRATION RATE: 18 BRPM | HEART RATE: 74 BPM | BODY MASS INDEX: 31.49 KG/M2 | DIASTOLIC BLOOD PRESSURE: 75 MMHG | SYSTOLIC BLOOD PRESSURE: 136 MMHG | TEMPERATURE: 95.7 F

## 2025-03-17 DIAGNOSIS — D64.9 ANEMIA, UNSPECIFIED TYPE: ICD-10-CM

## 2025-03-17 DIAGNOSIS — E53.8 B12 DEFICIENCY: ICD-10-CM

## 2025-03-17 PROCEDURE — 2500000004 HC RX 250 GENERAL PHARMACY W/ HCPCS (ALT 636 FOR OP/ED)

## 2025-03-17 PROCEDURE — 96372 THER/PROPH/DIAG INJ SC/IM: CPT

## 2025-03-17 RX ORDER — DIPHENHYDRAMINE HYDROCHLORIDE 50 MG/ML
50 INJECTION, SOLUTION INTRAMUSCULAR; INTRAVENOUS AS NEEDED
OUTPATIENT
Start: 2025-04-14

## 2025-03-17 RX ORDER — ALBUTEROL SULFATE 0.83 MG/ML
3 SOLUTION RESPIRATORY (INHALATION) AS NEEDED
OUTPATIENT
Start: 2025-04-14

## 2025-03-17 RX ORDER — CYANOCOBALAMIN 1000 UG/ML
INJECTION, SOLUTION INTRAMUSCULAR; SUBCUTANEOUS
Status: COMPLETED
Start: 2025-03-17 | End: 2025-03-17

## 2025-03-17 RX ORDER — CYANOCOBALAMIN 1000 UG/ML
1000 INJECTION, SOLUTION INTRAMUSCULAR; SUBCUTANEOUS ONCE
OUTPATIENT
Start: 2025-04-14

## 2025-03-17 RX ORDER — CYANOCOBALAMIN 1000 UG/ML
1000 INJECTION, SOLUTION INTRAMUSCULAR; SUBCUTANEOUS ONCE
Status: COMPLETED | OUTPATIENT
Start: 2025-03-17 | End: 2025-03-17

## 2025-03-17 RX ORDER — FAMOTIDINE 10 MG/ML
20 INJECTION, SOLUTION INTRAVENOUS ONCE AS NEEDED
OUTPATIENT
Start: 2025-04-14

## 2025-03-17 RX ORDER — EPINEPHRINE 0.3 MG/.3ML
0.3 INJECTION SUBCUTANEOUS EVERY 5 MIN PRN
OUTPATIENT
Start: 2025-04-14

## 2025-03-17 RX ADMIN — CYANOCOBALAMIN 1000 MCG: 1000 INJECTION, SOLUTION INTRAMUSCULAR; SUBCUTANEOUS at 13:59

## 2025-03-17 ASSESSMENT — PAIN SCALES - GENERAL: PAINLEVEL_OUTOF10: 0-NO PAIN

## 2025-04-03 ENCOUNTER — TELEPHONE (OUTPATIENT)
Age: 65
End: 2025-04-03
Payer: MEDICARE

## 2025-04-03 NOTE — TELEPHONE ENCOUNTER
PATIENT'S INS CO REQUIRED A PRIOR AUTH ON ANOSUSI MARIETTALU.    THEY PREFOR YOU CHANGE TO  STIOL TO RESPIMAAT OR  BEVESPI   AREOSPHERE.   PATIENT SAYS  ANORO HAS  CREATED A COUGH FOR HER.   PATIENT REALLY LIKED  SPIRIVA.    [facility]

## 2025-04-09 ENCOUNTER — TELEPHONE (OUTPATIENT)
Age: 65
End: 2025-04-09
Payer: MEDICARE

## 2025-04-09 DIAGNOSIS — J43.2 CENTRILOBULAR EMPHYSEMA (MULTI): Primary | ICD-10-CM

## 2025-04-10 NOTE — TELEPHONE ENCOUNTER
SENT IN  BEVESPI INHALER.   WILL COST OVER  $ 450,00 WITH DISCOUNT CARD.    PER  [facility].   PATIENT'S INS CO WILL PAY FOR STIIOLTO.     NOTHING ELS

## 2025-04-17 ENCOUNTER — APPOINTMENT (OUTPATIENT)
Age: 65
End: 2025-04-17
Payer: MEDICARE

## 2025-04-17 DIAGNOSIS — E53.8 B12 DEFICIENCY: ICD-10-CM

## 2025-04-17 PROCEDURE — 96372 THER/PROPH/DIAG INJ SC/IM: CPT | Performed by: FAMILY MEDICINE

## 2025-04-17 RX ORDER — CYANOCOBALAMIN 1000 UG/ML
1000 INJECTION, SOLUTION INTRAMUSCULAR; SUBCUTANEOUS ONCE
Status: COMPLETED | OUTPATIENT
Start: 2025-04-17 | End: 2025-04-17

## 2025-04-17 RX ADMIN — CYANOCOBALAMIN 1000 MCG: 1000 INJECTION, SOLUTION INTRAMUSCULAR; SUBCUTANEOUS at 11:03

## 2025-04-17 NOTE — PROGRESS NOTES
INJECTED 1 ML B12 IM,  RT   DELTOID. NO COMPLICATIONS  LOT [id number]   EXP. 05/30/2026    [facility]

## 2025-05-01 ENCOUNTER — LAB (OUTPATIENT)
Dept: LAB | Facility: HOSPITAL | Age: 65
End: 2025-05-01
Payer: MEDICARE

## 2025-05-01 ENCOUNTER — APPOINTMENT (OUTPATIENT)
Dept: LAB | Facility: HOSPITAL | Age: 65
End: 2025-05-01
Payer: MEDICARE

## 2025-05-01 LAB — MAGNESIUM SERPL-MCNC: 2.03 MG/DL (ref 1.6–2.4)

## 2025-05-01 PROCEDURE — 83735 ASSAY OF MAGNESIUM: CPT

## 2025-05-16 ENCOUNTER — APPOINTMENT (OUTPATIENT)
Age: 65
End: 2025-05-16
Payer: MEDICARE

## 2025-05-16 DIAGNOSIS — T78.40XS ALLERGY, SEQUELA: ICD-10-CM

## 2025-05-16 DIAGNOSIS — E11.9 TYPE 2 DIABETES MELLITUS WITHOUT COMPLICATION, UNSPECIFIED WHETHER LONG TERM INSULIN USE: ICD-10-CM

## 2025-05-16 DIAGNOSIS — E53.8 B12 DEFICIENCY: ICD-10-CM

## 2025-05-16 PROCEDURE — 96372 THER/PROPH/DIAG INJ SC/IM: CPT | Performed by: FAMILY MEDICINE

## 2025-05-16 RX ORDER — CYANOCOBALAMIN 1000 UG/ML
1000 INJECTION, SOLUTION INTRAMUSCULAR; SUBCUTANEOUS ONCE
Status: COMPLETED | OUTPATIENT
Start: 2025-05-16 | End: 2025-05-16

## 2025-05-16 RX ORDER — IBUPROFEN 200 MG
1 CAPSULE ORAL DAILY
Qty: 200 EACH | Refills: 3 | Status: SHIPPED | OUTPATIENT
Start: 2025-05-16

## 2025-05-16 RX ORDER — FEXOFENADINE HCL AND PSEUDOEPHEDRINE HCI 60; 120 MG/1; MG/1
1 TABLET, EXTENDED RELEASE ORAL EVERY 12 HOURS
Qty: 180 TABLET | Refills: 3 | Status: SHIPPED | OUTPATIENT
Start: 2025-05-16 | End: 2026-05-16

## 2025-05-16 RX ADMIN — CYANOCOBALAMIN 1000 MCG: 1000 INJECTION, SOLUTION INTRAMUSCULAR; SUBCUTANEOUS at 13:04

## 2025-05-22 DIAGNOSIS — J43.2 CENTRILOBULAR EMPHYSEMA (MULTI): ICD-10-CM

## 2025-05-22 DIAGNOSIS — E11.9 TYPE 2 DIABETES MELLITUS WITHOUT COMPLICATION, UNSPECIFIED WHETHER LONG TERM INSULIN USE: ICD-10-CM

## 2025-05-27 RX ORDER — TIOTROPIUM BROMIDE AND OLODATEROL 3.124; 2.736 UG/1; UG/1
2 SPRAY, METERED RESPIRATORY (INHALATION) DAILY
Qty: 12 G | Refills: 11 | Status: SHIPPED | OUTPATIENT
Start: 2025-05-27

## 2025-05-27 RX ORDER — IBUPROFEN 200 MG
CAPSULE ORAL
Qty: 200 EACH | Refills: 3 | Status: SHIPPED | OUTPATIENT
Start: 2025-05-27

## 2025-06-10 DIAGNOSIS — E11.9 TYPE 2 DIABETES MELLITUS WITHOUT COMPLICATION, UNSPECIFIED WHETHER LONG TERM INSULIN USE: ICD-10-CM

## 2025-06-10 RX ORDER — METFORMIN HYDROCHLORIDE 750 MG/1
1500 TABLET, EXTENDED RELEASE ORAL DAILY
Qty: 180 TABLET | Refills: 3 | Status: SHIPPED | OUTPATIENT
Start: 2025-06-10 | End: 2026-06-10

## 2025-06-11 LAB
ALBUMIN SERPL-MCNC: 4.2 G/DL (ref 3.6–5.1)
ALP SERPL-CCNC: 102 U/L (ref 37–153)
ALT SERPL-CCNC: 11 U/L (ref 6–29)
ANION GAP SERPL CALCULATED.4IONS-SCNC: 8 MMOL/L (CALC) (ref 7–17)
AST SERPL-CCNC: 11 U/L (ref 10–35)
BASOPHILS # BLD AUTO: 62 CELLS/UL (ref 0–200)
BASOPHILS NFR BLD AUTO: 0.7 %
BILIRUB SERPL-MCNC: 0.4 MG/DL (ref 0.2–1.2)
BUN SERPL-MCNC: 23 MG/DL (ref 7–25)
CALCIUM SERPL-MCNC: 11 MG/DL (ref 8.6–10.4)
CHLORIDE SERPL-SCNC: 99 MMOL/L (ref 98–110)
CO2 SERPL-SCNC: 29 MMOL/L (ref 20–32)
CREAT SERPL-MCNC: 1.58 MG/DL (ref 0.5–1.05)
EGFRCR SERPLBLD CKD-EPI 2021: 36 ML/MIN/1.73M2
EOSINOPHIL # BLD AUTO: 255 CELLS/UL (ref 15–500)
EOSINOPHIL NFR BLD AUTO: 2.9 %
ERYTHROCYTE [DISTWIDTH] IN BLOOD BY AUTOMATED COUNT: 13 % (ref 11–15)
EST. AVERAGE GLUCOSE BLD GHB EST-MCNC: 169 MG/DL
EST. AVERAGE GLUCOSE BLD GHB EST-SCNC: 9.3 MMOL/L
GLUCOSE SERPL-MCNC: 138 MG/DL (ref 65–99)
HBA1C MFR BLD: 7.5 %
HCT VFR BLD AUTO: 43.3 % (ref 35–45)
HGB BLD-MCNC: 14.2 G/DL (ref 11.7–15.5)
LDLC SERPL DIRECT ASSAY-MCNC: 77 MG/DL
LYMPHOCYTES # BLD AUTO: 2596 CELLS/UL (ref 850–3900)
LYMPHOCYTES NFR BLD AUTO: 29.5 %
MCH RBC QN AUTO: 28 PG (ref 27–33)
MCHC RBC AUTO-ENTMCNC: 32.8 G/DL (ref 32–36)
MCV RBC AUTO: 85.4 FL (ref 80–100)
MONOCYTES # BLD AUTO: 581 CELLS/UL (ref 200–950)
MONOCYTES NFR BLD AUTO: 6.6 %
NEUTROPHILS # BLD AUTO: 5306 CELLS/UL (ref 1500–7800)
NEUTROPHILS NFR BLD AUTO: 60.3 %
PLATELET # BLD AUTO: 218 THOUSAND/UL (ref 140–400)
PMV BLD REES-ECKER: 9.4 FL (ref 7.5–12.5)
POTASSIUM SERPL-SCNC: 5 MMOL/L (ref 3.5–5.3)
PROT SERPL-MCNC: 7 G/DL (ref 6.1–8.1)
RBC # BLD AUTO: 5.07 MILLION/UL (ref 3.8–5.1)
SODIUM SERPL-SCNC: 136 MMOL/L (ref 135–146)
TSH SERPL-ACNC: 3.35 MIU/L (ref 0.4–4.5)
WBC # BLD AUTO: 8.8 THOUSAND/UL (ref 3.8–10.8)

## 2025-06-13 ENCOUNTER — APPOINTMENT (OUTPATIENT)
Age: 65
End: 2025-06-13
Payer: MEDICARE

## 2025-06-13 VITALS
SYSTOLIC BLOOD PRESSURE: 100 MMHG | BODY MASS INDEX: 31.05 KG/M2 | HEART RATE: 64 BPM | HEIGHT: 66 IN | OXYGEN SATURATION: 96 % | DIASTOLIC BLOOD PRESSURE: 60 MMHG | WEIGHT: 193.2 LBS

## 2025-06-13 DIAGNOSIS — I47.10 PAROXYSMAL SVT (SUPRAVENTRICULAR TACHYCARDIA): ICD-10-CM

## 2025-06-13 DIAGNOSIS — J43.2 CENTRILOBULAR EMPHYSEMA (MULTI): ICD-10-CM

## 2025-06-13 DIAGNOSIS — N28.9 RENAL INSUFFICIENCY: ICD-10-CM

## 2025-06-13 DIAGNOSIS — T78.40XS ALLERGY, SEQUELA: ICD-10-CM

## 2025-06-13 DIAGNOSIS — E87.6 HYPOKALEMIA: ICD-10-CM

## 2025-06-13 DIAGNOSIS — K21.9 GASTROESOPHAGEAL REFLUX DISEASE, UNSPECIFIED WHETHER ESOPHAGITIS PRESENT: ICD-10-CM

## 2025-06-13 DIAGNOSIS — E53.8 B12 DEFICIENCY: ICD-10-CM

## 2025-06-13 DIAGNOSIS — Z72.0 TOBACCO ABUSE: ICD-10-CM

## 2025-06-13 DIAGNOSIS — E78.2 MIXED HYPERLIPIDEMIA: ICD-10-CM

## 2025-06-13 DIAGNOSIS — D64.9 ANEMIA, UNSPECIFIED TYPE: ICD-10-CM

## 2025-06-13 DIAGNOSIS — K76.6 PORTAL HYPERTENSION (MULTI): ICD-10-CM

## 2025-06-13 DIAGNOSIS — E11.9 TYPE 2 DIABETES MELLITUS WITHOUT COMPLICATION, UNSPECIFIED WHETHER LONG TERM INSULIN USE: Primary | ICD-10-CM

## 2025-06-13 DIAGNOSIS — K74.60 HEPATIC CIRRHOSIS, UNSPECIFIED HEPATIC CIRRHOSIS TYPE, UNSPECIFIED WHETHER ASCITES PRESENT (MULTI): ICD-10-CM

## 2025-06-13 RX ORDER — URSODIOL 250 MG/1
250 TABLET, FILM COATED ORAL 2 TIMES DAILY
COMMUNITY
Start: 2025-04-22

## 2025-06-13 RX ORDER — TRIAMCINOLONE ACETONIDE 55 UG/1
2 SPRAY, METERED NASAL DAILY
Qty: 16.5 G | Refills: 11 | Status: SHIPPED | OUTPATIENT
Start: 2025-06-13 | End: 2026-06-13

## 2025-06-13 RX ORDER — METOPROLOL TARTRATE 50 MG/1
50 TABLET ORAL 2 TIMES DAILY
Qty: 180 TABLET | Refills: 3 | Status: SHIPPED | OUTPATIENT
Start: 2025-06-13 | End: 2026-06-13

## 2025-06-13 RX ORDER — FUROSEMIDE 40 MG/1
40 TABLET ORAL
Qty: 180 TABLET | Refills: 3 | Status: SHIPPED | OUTPATIENT
Start: 2025-06-13 | End: 2026-06-13

## 2025-06-13 RX ORDER — IBUPROFEN 200 MG
1 CAPSULE ORAL 2 TIMES DAILY
Qty: 200 EACH | Refills: 3 | Status: SHIPPED | OUTPATIENT
Start: 2025-06-13 | End: 2026-06-13

## 2025-06-13 RX ORDER — CYANOCOBALAMIN 1000 UG/ML
1000 INJECTION, SOLUTION INTRAMUSCULAR; SUBCUTANEOUS ONCE
Status: COMPLETED | OUTPATIENT
Start: 2025-06-13 | End: 2025-06-13

## 2025-06-13 RX ORDER — PANTOPRAZOLE SODIUM 40 MG/1
40 TABLET, DELAYED RELEASE ORAL DAILY
Qty: 30 TABLET | Refills: 5 | Status: SHIPPED | OUTPATIENT
Start: 2025-06-13 | End: 2025-12-10

## 2025-06-13 RX ADMIN — CYANOCOBALAMIN 1000 MCG: 1000 INJECTION, SOLUTION INTRAMUSCULAR; SUBCUTANEOUS at 15:42

## 2025-06-13 ASSESSMENT — ENCOUNTER SYMPTOMS
UNEXPECTED WEIGHT CHANGE: 0
DYSPHORIC MOOD: 0
CONSTIPATION: 0
NUMBNESS: 0
NERVOUS/ANXIOUS: 0
DIZZINESS: 0
HEADACHES: 0
SLEEP DISTURBANCE: 0
APPETITE CHANGE: 0
SHORTNESS OF BREATH: 1
ADENOPATHY: 0
FATIGUE: 0
PALPITATIONS: 0
ABDOMINAL DISTENTION: 0
ARTHRALGIAS: 0
ACTIVITY CHANGE: 0
COUGH: 1
WHEEZING: 0
DIFFICULTY URINATING: 0
ABDOMINAL PAIN: 0
TROUBLE SWALLOWING: 0
VOMITING: 0
NAUSEA: 1
DIARRHEA: 0
RHINORRHEA: 0
LIGHT-HEADEDNESS: 0

## 2025-06-13 NOTE — ASSESSMENT & PLAN NOTE
A1c testing 7.5, would like to try a GLP-1, however will place the patient on SGL 2 and discontinue glimepiride and recheck again in 3 months.  Patient to contact the office if the medication is cost prohibitive.

## 2025-06-13 NOTE — ASSESSMENT & PLAN NOTE
Controlled with medications, unusual use of metoprolol, takes 50 mg in the morning in the evening and takes a 25 mg tablet and a half of the 25 mg tablet in the evening before bed.  Also taking diltiazem, heart rate is currently stable, suggested possibly increasing metoprolol to 100 mg twice daily or even increasing diltiazem and just use 50 mg twice daily.

## 2025-06-13 NOTE — ASSESSMENT & PLAN NOTE
Strongly encouraged to quit smoking, continue with current inhalers, CT scan in October in the emergency room was stable.

## 2025-06-13 NOTE — ASSESSMENT & PLAN NOTE
Following now with hepatology at the [facility], discontinuing Actigall, hepatic ultrasound up-to-date, reduce furosemide to twice a day from 3 times a day, patient's creatinine is beginning to increase, and appears to be dry.

## 2025-06-13 NOTE — PATIENT INSTRUCTIONS
Replace omeprazole with pantoprazole, stop glimepiride and start Jardiance, reduce furosemide to twice a day

## 2025-06-13 NOTE — PROGRESS NOTES
"Subjective   Patient ID: Ana Melvin is a 65 y.o. female who presents for 3 Mo LABS.    HPI   No headache, chest pain, shortness of breath, dizziness, lightheadedness, some edema  No low blood sugars since last OV, seen opthalmology in the past year, and no numbness or tingling in feet, skin normal.  Seen hepatology/GI at [facility] in April, recommended stopping Actigall  No ascities, upset stomach at times, some constipation at times  + burping and belching at times  Was taking oral Fe, upset stomach, stopped taking  + SOB, head congestion, coughing at times  Palpitations OK with metoprolol  Still smoking despite   Defers MMG due to implants    Review of Systems   Constitutional:  Negative for activity change, appetite change, fatigue and unexpected weight change.   HENT:  Negative for ear pain, nosebleeds, rhinorrhea, sneezing and trouble swallowing.    Respiratory:  Positive for cough and shortness of breath. Negative for wheezing.    Cardiovascular:  Negative for chest pain, palpitations and leg swelling.   Gastrointestinal:  Positive for nausea. Negative for abdominal distention, abdominal pain, constipation, diarrhea and vomiting.   Genitourinary:  Negative for difficulty urinating.   Musculoskeletal:  Negative for arthralgias.   Skin:  Negative for rash.   Neurological:  Negative for dizziness, light-headedness, numbness and headaches.   Hematological:  Negative for adenopathy.   Psychiatric/Behavioral:  Negative for behavioral problems, dysphoric mood and sleep disturbance. The patient is not nervous/anxious.    All other systems reviewed and are negative.      Objective   /60   Pulse 64   Ht 1.676 m (5' 6\")   Wt 87.6 kg (193 lb 3.2 oz)   SpO2 96%   BMI 31.18 kg/m²     Physical Exam  Vitals and nursing note reviewed.   Constitutional:       General: She is not in acute distress.     Appearance: Normal appearance. She is not toxic-appearing.   HENT:      Head: Normocephalic and atraumatic.      " Right Ear: Tympanic membrane, ear canal and external ear normal.      Left Ear: Tympanic membrane, ear canal and external ear normal.      Nose: Nose normal.      Mouth/Throat:      Mouth: Mucous membranes are moist.      Pharynx: Oropharynx is clear.   Eyes:      Extraocular Movements: Extraocular movements intact.      Conjunctiva/sclera: Conjunctivae normal.      Pupils: Pupils are equal, round, and reactive to light.   Cardiovascular:      Rate and Rhythm: Normal rate and regular rhythm.      Pulses: Normal pulses.      Heart sounds: Normal heart sounds.   Pulmonary:      Effort: Pulmonary effort is normal.      Breath sounds: Normal breath sounds.   Abdominal:      General: Abdomen is flat. Bowel sounds are normal.      Palpations: Abdomen is soft.      Comments: No ascites on examination, only +1 edema at the ankles.   Musculoskeletal:      Cervical back: Normal range of motion and neck supple.   Skin:     General: Skin is warm and dry.      Capillary Refill: Capillary refill takes less than 2 seconds.   Neurological:      General: No focal deficit present.      Mental Status: She is alert and oriented to person, place, and time. Mental status is at baseline.   Psychiatric:         Mood and Affect: Mood normal.         Behavior: Behavior normal.         Assessment/Plan   Problem List Items Addressed This Visit           ICD-10-CM    COPD (chronic obstructive pulmonary disease) (Multi) J44.9    Strongly encouraged to quit smoking, continue with current inhalers, CT scan in October in the emergency room was stable.         Relevant Orders    Follow Up In Primary Care - Established    Diabetes mellitus (Multi) - Primary E11.9    A1c testing 7.5, would like to try a GLP-1, however will place the patient on SGL 2 and discontinue glimepiride and recheck again in 3 months.  Patient to contact the office if the medication is cost prohibitive.         Relevant Medications    blood sugar diagnostic (Blood Glucose Test)     empagliflozin (Jardiance) 25 mg tablet    Other Relevant Orders    Comprehensive Metabolic Panel    Hemoglobin A1C    Follow Up In Primary Care - Established    GERD (gastroesophageal reflux disease) K21.9    Try changing PPI to pantoprazole from omeprazole, scope up-to-date.         Relevant Medications    pantoprazole (ProtoNix) 40 mg EC tablet    Other Relevant Orders    Follow Up In Primary Care - Established    Paroxysmal SVT (supraventricular tachycardia) I47.10    Controlled with medications, unusual use of metoprolol, takes 50 mg in the morning in the evening and takes a 25 mg tablet and a half of the 25 mg tablet in the evening before bed.  Also taking diltiazem, heart rate is currently stable, suggested possibly increasing metoprolol to 100 mg twice daily or even increasing diltiazem and just use 50 mg twice daily.         Relevant Medications    metoprolol tartrate (Lopressor) 50 mg tablet    furosemide (Lasix) 40 mg tablet    Other Relevant Orders    Follow Up In Primary Care - Established    B12 deficiency E53.8    Continue with replacement         Relevant Medications    cyanocobalamin (Vitamin B-12) injection 1,000 mcg (Completed)    Other Relevant Orders    Follow Up In Primary Care - Established    Anemia D64.9    Hemoglobin stable         Relevant Orders    CBC and Auto Differential    Follow Up In Primary Care - Established    Allergies T78.40XA    Relevant Medications    triamcinolone (Nasacort) 55 mcg nasal inhaler    Other Relevant Orders    Follow Up In Primary Care - Established    Hepatic cirrhosis (Multi) K74.60    Following now with hepatology at the [facility], discontinuing Actigall, hepatic ultrasound up-to-date, reduce furosemide to twice a day from 3 times a day, patient's creatinine is beginning to increase, and appears to be dry.         Relevant Orders    Follow Up In Primary Care - Established    Mixed hyperlipidemia E78.2    LDL cholesterol now at goal, tolerating  rosuvastatin.         Relevant Orders    Comprehensive Metabolic Panel    Follow Up In Primary Care - Established    Portal hypertension (Multi) K76.6    Following with hepatology         Renal insufficiency N28.9    Try reducing furosemide to twice a day, add SGL 2, recheck in 3 months          Other Visit Diagnoses         Codes      Hypokalemia     E87.6    Relevant Orders    Follow Up In Primary Care - Established      Tobacco abuse     Z72.0    Relevant Orders    Follow Up In Primary Care - Established

## 2025-06-14 DIAGNOSIS — D64.9 ANEMIA, UNSPECIFIED TYPE: ICD-10-CM

## 2025-06-14 DIAGNOSIS — K74.60 HEPATIC CIRRHOSIS, UNSPECIFIED HEPATIC CIRRHOSIS TYPE, UNSPECIFIED WHETHER ASCITES PRESENT (MULTI): ICD-10-CM

## 2025-06-14 DIAGNOSIS — E78.2 MIXED HYPERLIPIDEMIA: ICD-10-CM

## 2025-06-14 DIAGNOSIS — E11.9 TYPE 2 DIABETES MELLITUS WITHOUT COMPLICATION, UNSPECIFIED WHETHER LONG TERM INSULIN USE: ICD-10-CM

## 2025-06-16 DIAGNOSIS — E87.6 HYPOKALEMIA: ICD-10-CM

## 2025-06-16 RX ORDER — POTASSIUM CHLORIDE 750 MG/1
10 TABLET, FILM COATED, EXTENDED RELEASE ORAL DAILY
Qty: 90 TABLET | Refills: 3 | Status: SHIPPED | OUTPATIENT
Start: 2025-06-16 | End: 2026-06-16

## 2025-07-11 ENCOUNTER — APPOINTMENT (OUTPATIENT)
Age: 65
End: 2025-07-11
Payer: MEDICARE

## 2025-07-18 ENCOUNTER — HOSPITAL ENCOUNTER (OUTPATIENT)
Age: 65
Discharge: HOME | End: 2025-07-18
Payer: MEDICARE

## 2025-07-18 DIAGNOSIS — K74.60: Primary | ICD-10-CM

## 2025-07-18 LAB
ABSOLUTE NEUTROPHIL COUNT: 6.1 X10^3/UL (ref 2–7.7)
ALBUMIN SERPL-MCNC: 4.4 G/DL (ref 3.4–4.8)
ALBUMIN/GLOB SERPL: 1.3 RATIO (ref 0.9–2.4)
ALP SERPL-CCNC: 119 U/L (ref 35–104)
ALT SERPL-CCNC: 12 U/L (ref ?–34)
AMMONIA: 10.8 UMOL/L (ref 11–51)
ANION GAP SERPL CALC-SCNC: 13 MMOL/L (ref 5–15)
ANISOCYTOSIS BLD QL SMEAR: (no result)
AST(SGOT): 14 U/L (ref ?–31)
BASO STIPL BLD QL SMEAR: (no result)
BASOPHIL#: 0.07 X10^3/UL
BASOPHIL%: 0.7 % (ref 0–1)
BASOPHILS NFR SPEC MANUAL: (no result) % (ref 0–1)
BITE CELLS BLD QL SMEAR: (no result)
BUN SERPL-MCNC: 26 MG/DL (ref 4–19)
BUN/CREAT SERPL: 13.9 RATIO (ref 10–20)
BURR CELLS BLD QL SMEAR: (no result)
CALCIUM SERPL-MCNC: 11.3 MG/DL (ref 7.6–11)
CHLORIDE: 100 MMOL/L (ref 98–108)
CO2 BLDCV-SCNC: 22.8 MMOL/L (ref 21–32)
CREAT SERPL-MCNC: 1.88 MG/DL (ref 0.7–1.2)
CRP SERPL-MCNC: 9.2 MG/L (ref 0–3)
DEPRECATED RDW RBC: 39.7 FL (ref 35.1–43.9)
DOHLE BOD BLD QL SMEAR: (no result)
EOSINOPHIL # BLD: 0.24 X10^3/UL
ERYTHROCYTE [DISTWIDTH] IN BLOOD: 13.4 % (ref 11.6–14.6)
ERYTHROCYTE [SEDIMENTATION RATE] IN BLOOD: 12 MM/HR (ref 0–30)
GLOBULIN: 3.4 G/DL (ref 2.2–4.2)
GLUCOSE SERPL-MCNC: 130 MG/DL (ref 70–99)
HCT VFR BLD AUTO: 43.3 % (ref 37–47)
HGB BLD-MCNC: 14.4 G/DL (ref 12–15)
HOWELL-JOLLY BOD BLD QL SMEAR: (no result)
HYPOCHROMIA BLD QL: (no result)
IMM GRANULOCYTES NFR BLD AUTO: 0.5 % (ref 0–0.9)
IMMATURE GRANULOCYTES COUNT: 0.05 X10^3/UL (ref 0–0)
INTERNATIONAL NORMALIZED RATIO: 1
LDH: 102 U/L (ref 84–246)
LYMPHOCYTES # SPEC AUTO: 3.06 X10^3/UL (ref 0.83–4.51)
LYMPHOCYTES # SPEC AUTO: 3.06 X10^3/UL (ref 0.83–4.51)
LYMPHOCYTES NFR SPEC AUTO: 30.3 % (ref 19–41)
Lab: 2.4 % (ref 0–5)
MACROCYTES BLD QL: (no result)
MANUAL DIF COMMENT BLD-IMP: (no result)
MCH RBC QN AUTO: 27.4 PG (ref 27–32)
MCV RBC: 82.3 FL (ref 81–99)
MEAN CORP HGB CONC: 33.3 G/DL (ref 32–36)
MEAN PLATELET VOL.: 9.2 FL (ref 6.2–12)
MONOCYTE#: 0.63 X10^3/UL
MONOCYTE%: 6.2 % (ref 0–10)
NEUTROPHIL #: 6.06 X10^3/UL (ref 2.7–7.7)
NEUTROPHILS NFR BLD AUTO: 59.9 % (ref 47–70)
NEUTS HYPERSEG # BLD: (no result) 10*3/UL
NRBC FLAGGED BY ANALYZER: 0 % (ref 0–5)
PLATELET # BLD: 228 K/MM3 (ref 150–450)
POLYCHROMASIA BLD QL SMEAR: (no result)
POTASSIUM SPEC-MCNC: 5.1 MMOL/L (ref 3.3–5.1)
PROTEIN, TOTAL: 7.8 G/DL (ref 5.9–8.4)
PROTHROMBIN TIME (PROTIME)PT.: 13.6 SECONDS (ref 11.7–14.9)
RBC # BLD AUTO: 5.26 M/MM3 (ref 4.2–5.4)
SODIUM LEVEL: 136 MMOL/L (ref 133–145)
TOTAL BILIRUBIN: 0.39 MG/DL (ref 0–1.3)
WBC # BLD AUTO: 10.1 K/MM3 (ref 4.4–11)
WBC NRBC COR # BLD: (no result) K/MM3 (ref 4.4–11)

## 2025-07-18 PROCEDURE — 85610 PROTHROMBIN TIME: CPT

## 2025-07-18 PROCEDURE — 85025 COMPLETE CBC W/AUTO DIFF WBC: CPT

## 2025-07-18 PROCEDURE — 82140 ASSAY OF AMMONIA: CPT

## 2025-07-18 PROCEDURE — 80053 COMPREHEN METABOLIC PANEL: CPT

## 2025-07-18 PROCEDURE — 36415 COLL VENOUS BLD VENIPUNCTURE: CPT

## 2025-07-18 PROCEDURE — 85652 RBC SED RATE AUTOMATED: CPT

## 2025-07-18 PROCEDURE — 83615 LACTATE (LD) (LDH) ENZYME: CPT

## 2025-07-18 PROCEDURE — 86140 C-REACTIVE PROTEIN: CPT

## 2025-07-22 ENCOUNTER — TELEPHONE (OUTPATIENT)
Age: 65
End: 2025-07-22
Payer: MEDICARE

## 2025-07-22 NOTE — TELEPHONE ENCOUNTER
PATIENT BROUGHT IN LABS FROM [facility]. REVIEWED BY DR. ELDER. PER HIS INSTRUCTIONS PATIENT IS TO STOP JARDIANCE. RECHECK BMP IN 1 WEEK. SCHEDULED A FOLLOW UP 8/5/25, SOONER IF NEEDED. PATIENT VERBALIZED UNDERSTANDING OF ALL.

## 2025-07-23 DIAGNOSIS — Z12.31 ENCOUNTER FOR SCREENING MAMMOGRAM FOR BREAST CANCER: ICD-10-CM

## 2025-07-31 LAB
ALBUMIN SERPL-MCNC: 4.2 G/DL (ref 3.6–5.1)
ALBUMIN/CREAT UR: 72 MG/G CREAT
ALP SERPL-CCNC: 100 U/L (ref 37–153)
ALT SERPL-CCNC: 11 U/L (ref 6–29)
ANION GAP SERPL CALCULATED.4IONS-SCNC: 9 MMOL/L (CALC) (ref 7–17)
AST SERPL-CCNC: 12 U/L (ref 10–35)
BASOPHILS # BLD AUTO: 64 CELLS/UL (ref 0–200)
BASOPHILS NFR BLD AUTO: 0.7 %
BILIRUB SERPL-MCNC: 0.5 MG/DL (ref 0.2–1.2)
BUN SERPL-MCNC: 26 MG/DL (ref 7–25)
CALCIUM SERPL-MCNC: 11.3 MG/DL (ref 8.6–10.4)
CHLORIDE SERPL-SCNC: 102 MMOL/L (ref 98–110)
CO2 SERPL-SCNC: 26 MMOL/L (ref 20–32)
CREAT SERPL-MCNC: 1.72 MG/DL (ref 0.5–1.05)
CREAT UR-MCNC: 122 MG/DL (ref 20–275)
EGFRCR SERPLBLD CKD-EPI 2021: 32 ML/MIN/1.73M2
EOSINOPHIL # BLD AUTO: 212 CELLS/UL (ref 15–500)
EOSINOPHIL NFR BLD AUTO: 2.3 %
ERYTHROCYTE [DISTWIDTH] IN BLOOD BY AUTOMATED COUNT: 12.9 % (ref 11–15)
EST. AVERAGE GLUCOSE BLD GHB EST-MCNC: 174 MG/DL
EST. AVERAGE GLUCOSE BLD GHB EST-SCNC: 9.7 MMOL/L
GLUCOSE SERPL-MCNC: 137 MG/DL (ref 65–99)
HBA1C MFR BLD: 7.7 %
HCT VFR BLD AUTO: 44.7 % (ref 35–45)
HGB BLD-MCNC: 14.3 G/DL (ref 11.7–15.5)
LYMPHOCYTES # BLD AUTO: 2631 CELLS/UL (ref 850–3900)
LYMPHOCYTES NFR BLD AUTO: 28.6 %
MCH RBC QN AUTO: 27.1 PG (ref 27–33)
MCHC RBC AUTO-ENTMCNC: 32 G/DL (ref 32–36)
MCV RBC AUTO: 84.7 FL (ref 80–100)
MICROALBUMIN UR-MCNC: 8.8 MG/DL
MONOCYTES # BLD AUTO: 570 CELLS/UL (ref 200–950)
MONOCYTES NFR BLD AUTO: 6.2 %
NEUTROPHILS # BLD AUTO: 5722 CELLS/UL (ref 1500–7800)
NEUTROPHILS NFR BLD AUTO: 62.2 %
PLATELET # BLD AUTO: 240 THOUSAND/UL (ref 140–400)
PMV BLD REES-ECKER: 9.6 FL (ref 7.5–12.5)
POTASSIUM SERPL-SCNC: 5.4 MMOL/L (ref 3.5–5.3)
PROT SERPL-MCNC: 7.2 G/DL (ref 6.1–8.1)
RBC # BLD AUTO: 5.28 MILLION/UL (ref 3.8–5.1)
SODIUM SERPL-SCNC: 137 MMOL/L (ref 135–146)
TSH SERPL-ACNC: 3.44 MIU/L (ref 0.4–4.5)
WBC # BLD AUTO: 9.2 THOUSAND/UL (ref 3.8–10.8)

## 2025-08-05 ENCOUNTER — APPOINTMENT (OUTPATIENT)
Age: 65
End: 2025-08-05
Payer: MEDICARE

## 2025-08-05 VITALS
DIASTOLIC BLOOD PRESSURE: 60 MMHG | BODY MASS INDEX: 30.12 KG/M2 | SYSTOLIC BLOOD PRESSURE: 110 MMHG | OXYGEN SATURATION: 97 % | WEIGHT: 187.4 LBS | HEIGHT: 66 IN | HEART RATE: 75 BPM

## 2025-08-05 DIAGNOSIS — K74.60 HEPATIC CIRRHOSIS, UNSPECIFIED HEPATIC CIRRHOSIS TYPE, UNSPECIFIED WHETHER ASCITES PRESENT (MULTI): ICD-10-CM

## 2025-08-05 DIAGNOSIS — J43.2 CENTRILOBULAR EMPHYSEMA (MULTI): ICD-10-CM

## 2025-08-05 DIAGNOSIS — R80.9 MICROALBUMINURIA: ICD-10-CM

## 2025-08-05 DIAGNOSIS — K76.6 PORTAL HYPERTENSION (MULTI): ICD-10-CM

## 2025-08-05 DIAGNOSIS — I47.10 PAROXYSMAL SVT (SUPRAVENTRICULAR TACHYCARDIA): ICD-10-CM

## 2025-08-05 DIAGNOSIS — N28.9 RENAL INSUFFICIENCY: ICD-10-CM

## 2025-08-05 DIAGNOSIS — E53.8 B12 DEFICIENCY: ICD-10-CM

## 2025-08-05 DIAGNOSIS — K21.9 GASTROESOPHAGEAL REFLUX DISEASE, UNSPECIFIED WHETHER ESOPHAGITIS PRESENT: ICD-10-CM

## 2025-08-05 DIAGNOSIS — E11.9 TYPE 2 DIABETES MELLITUS WITHOUT COMPLICATION, WITHOUT LONG-TERM CURRENT USE OF INSULIN: Primary | ICD-10-CM

## 2025-08-05 PROCEDURE — 3078F DIAST BP <80 MM HG: CPT | Performed by: FAMILY MEDICINE

## 2025-08-05 PROCEDURE — 4010F ACE/ARB THERAPY RXD/TAKEN: CPT | Performed by: FAMILY MEDICINE

## 2025-08-05 PROCEDURE — 3074F SYST BP LT 130 MM HG: CPT | Performed by: FAMILY MEDICINE

## 2025-08-05 PROCEDURE — 3008F BODY MASS INDEX DOCD: CPT | Performed by: FAMILY MEDICINE

## 2025-08-05 PROCEDURE — 1159F MED LIST DOCD IN RCRD: CPT | Performed by: FAMILY MEDICINE

## 2025-08-05 PROCEDURE — 96372 THER/PROPH/DIAG INJ SC/IM: CPT | Performed by: FAMILY MEDICINE

## 2025-08-05 PROCEDURE — 99214 OFFICE O/P EST MOD 30 MIN: CPT | Performed by: FAMILY MEDICINE

## 2025-08-05 RX ORDER — URSODIOL 250 MG/1
250 TABLET, FILM COATED ORAL DAILY
Qty: 90 TABLET | Refills: 3 | Status: SHIPPED | OUTPATIENT
Start: 2025-08-05 | End: 2026-08-05

## 2025-08-05 RX ORDER — TIRZEPATIDE 2.5 MG/.5ML
2.5 INJECTION, SOLUTION SUBCUTANEOUS WEEKLY
Qty: 4 PEN | Refills: 0 | Status: SHIPPED | OUTPATIENT
Start: 2025-08-05 | End: 2025-09-04

## 2025-08-05 RX ORDER — PANTOPRAZOLE SODIUM 40 MG/1
40 TABLET, DELAYED RELEASE ORAL DAILY
Qty: 90 TABLET | Refills: 3 | Status: SHIPPED | OUTPATIENT
Start: 2025-08-05 | End: 2026-08-05

## 2025-08-05 RX ORDER — FUROSEMIDE 40 MG/1
40 TABLET ORAL DAILY
Qty: 90 TABLET | Refills: 3 | Status: SHIPPED | OUTPATIENT
Start: 2025-08-05 | End: 2026-08-05

## 2025-08-05 RX ORDER — CYANOCOBALAMIN 1000 UG/ML
1000 INJECTION, SOLUTION INTRAMUSCULAR; SUBCUTANEOUS ONCE
Status: COMPLETED | OUTPATIENT
Start: 2025-08-05 | End: 2025-08-05

## 2025-08-05 RX ORDER — DILTIAZEM HYDROCHLORIDE 180 MG/1
180 CAPSULE, COATED, EXTENDED RELEASE ORAL DAILY
Qty: 90 CAPSULE | Refills: 3 | Status: SHIPPED | OUTPATIENT
Start: 2025-08-05 | End: 2026-08-05

## 2025-08-05 RX ORDER — METOPROLOL TARTRATE 25 MG/1
25 TABLET, FILM COATED ORAL 2 TIMES DAILY
Qty: 180 TABLET | Refills: 3 | Status: SHIPPED | OUTPATIENT
Start: 2025-08-05 | End: 2026-08-05

## 2025-08-05 RX ORDER — LOSARTAN POTASSIUM 25 MG/1
25 TABLET ORAL DAILY
Qty: 30 TABLET | Refills: 11 | Status: SHIPPED | OUTPATIENT
Start: 2025-08-05 | End: 2026-08-05

## 2025-08-05 RX ORDER — ALBUTEROL SULFATE 90 UG/1
2 INHALANT RESPIRATORY (INHALATION) EVERY 4 HOURS PRN
Qty: 36 G | Refills: 3 | Status: SHIPPED | OUTPATIENT
Start: 2025-08-05 | End: 2026-08-05

## 2025-08-05 RX ORDER — SPIRONOLACTONE 25 MG/1
50 TABLET ORAL DAILY
Qty: 60 TABLET | Refills: 11 | Status: SHIPPED | OUTPATIENT
Start: 2025-08-05 | End: 2026-08-05

## 2025-08-05 RX ADMIN — CYANOCOBALAMIN 1000 MCG: 1000 INJECTION, SOLUTION INTRAMUSCULAR; SUBCUTANEOUS at 10:56

## 2025-08-05 ASSESSMENT — ENCOUNTER SYMPTOMS
DIZZINESS: 0
LIGHT-HEADEDNESS: 0
ACTIVITY CHANGE: 0
TROUBLE SWALLOWING: 0
CONSTIPATION: 0
DIARRHEA: 0
HEADACHES: 0
ABDOMINAL PAIN: 0
WHEEZING: 0
NUMBNESS: 0
VOMITING: 0
SHORTNESS OF BREATH: 0
APPETITE CHANGE: 0
FATIGUE: 0
PALPITATIONS: 0
COUGH: 0
UNEXPECTED WEIGHT CHANGE: 0
DIFFICULTY URINATING: 0
ARTHRALGIAS: 0
RHINORRHEA: 0
NAUSEA: 0
ABDOMINAL DISTENTION: 0
ADENOPATHY: 0

## 2025-08-05 NOTE — ASSESSMENT & PLAN NOTE
Following with hepatology, thought mostly to be related to fat congestion of the liver, to continue with statin, start GLP-1, continue to work with weight loss.  Concerned that renal function may also be attributed to the patient's use of diuretics, since she appears to be dry reduce furosemide to 40 mg once a day and spironolactone to 25 mg once a day, plan to recheck in 1 month with renal functions.

## 2025-08-05 NOTE — PATIENT INSTRUCTIONS
Reduce aldactone to 25 mg a day and furosemide to one 40 mg a day.  Start losartan once a day and mounjoro 2.5 mg once a week.  Keep appointment in September and get blood testing for kidneys prior

## 2025-08-05 NOTE — ASSESSMENT & PLAN NOTE
Will change SGL 2 to GLP-1, reduce furosemide to 40 mg once a day and reduce spironolactone to 25 mg once a day.

## 2025-08-05 NOTE — PROGRESS NOTES
"Subjective   Patient ID: Ana Melvin is a 65 y.o. female who presents for 2 MO LABS.    HPI   No headache, chest pain, shortness of breath, dizziness, lightheadedness, or edema  No low blood sugars since last OV, seen opthalmology in the past year, and no numbness or tingling in feet, skin normal.  No swelling, seeing hepatology at [facility] (Rx Actigall once a day)    Review of Systems   Constitutional:  Negative for activity change, appetite change, fatigue and unexpected weight change.   HENT:  Negative for ear pain, nosebleeds, rhinorrhea, sneezing and trouble swallowing.    Respiratory:  Negative for cough, shortness of breath and wheezing.    Cardiovascular:  Negative for chest pain, palpitations and leg swelling.   Gastrointestinal:  Negative for abdominal distention, abdominal pain, constipation, diarrhea, nausea and vomiting.   Genitourinary:  Negative for difficulty urinating.   Musculoskeletal:  Negative for arthralgias.   Skin:  Negative for rash.   Neurological:  Negative for dizziness, light-headedness, numbness and headaches.   Hematological:  Negative for adenopathy.   Psychiatric/Behavioral:  Negative for behavioral problems.    All other systems reviewed and are negative.      Objective   /60   Pulse 75   Ht 1.676 m (5' 6\")   Wt 85 kg (187 lb 6.4 oz)   SpO2 97%   BMI 30.25 kg/m²     Physical Exam  Vitals and nursing note reviewed.   Constitutional:       Appearance: Normal appearance.   HENT:      Head: Normocephalic and atraumatic.      Right Ear: Tympanic membrane, ear canal and external ear normal.      Left Ear: Tympanic membrane, ear canal and external ear normal.      Nose: Nose normal.      Mouth/Throat:      Mouth: Mucous membranes are moist.      Pharynx: Oropharynx is clear.     Cardiovascular:      Rate and Rhythm: Normal rate and regular rhythm.      Pulses: Normal pulses.      Heart sounds: Normal heart sounds.      Comments: No ankle swelling noted, no edema noted over abdomen " as well.  Pulmonary:      Effort: Pulmonary effort is normal.      Breath sounds: Normal breath sounds.     Musculoskeletal:      Cervical back: Normal range of motion and neck supple.     Skin:     General: Skin is warm and dry.      Capillary Refill: Capillary refill takes less than 2 seconds.     Neurological:      Mental Status: She is alert.     Psychiatric:         Mood and Affect: Mood normal.         Behavior: Behavior normal.         Assessment/Plan   Problem List Items Addressed This Visit           ICD-10-CM    COPD (chronic obstructive pulmonary disease) (Multi) J44.9    Again strongly encouraged to quit smoking, continue with current inhaler and nebulizer treatments.         Relevant Medications    albuterol 90 mcg/actuation inhaler    Diabetes mellitus (Multi) - Primary E11.9    Addition of SGLT2 caused increase in creatinine and reduction in renal function.  Patient noticing blood sugars are elevating, discontinue SGL 2, start GLP-1 with Mounjaro at 2.5 mg once a week, advised and instructed about use of pen.         Relevant Medications    losartan (Cozaar) 25 mg tablet    tirzepatide (Mounjaro) 2.5 mg/0.5 mL pen injector    GERD (gastroesophageal reflux disease) K21.9    Relevant Medications    dilTIAZem CD (Cartia XT) 180 mg 24 hr capsule    pantoprazole (ProtoNix) 40 mg EC tablet    Paroxysmal SVT (supraventricular tachycardia) I47.10    Relevant Medications    dilTIAZem CD (Cartia XT) 180 mg 24 hr capsule    metoprolol tartrate (Lopressor) 25 mg tablet    furosemide (Lasix) 40 mg tablet    B12 deficiency E53.8    Relevant Medications    cyanocobalamin (Vitamin B-12) injection 1,000 mcg (Completed)    Hepatic cirrhosis (Multi) K74.60    Following with hepatology, thought mostly to be related to fat congestion of the liver, to continue with statin, start GLP-1, continue to work with weight loss.  Concerned that renal function may also be attributed to the patient's use of diuretics, since she  appears to be dry reduce furosemide to 40 mg once a day and spironolactone to 25 mg once a day, plan to recheck in 1 month with renal functions.         Relevant Medications    spironolactone (Aldactone) 25 mg tablet    Portal hypertension (Multi) K76.6    Renal insufficiency N28.9    Will change SGL 2 to GLP-1, reduce furosemide to 40 mg once a day and reduce spironolactone to 25 mg once a day.         Relevant Medications    losartan (Cozaar) 25 mg tablet    Other Relevant Orders    Comprehensive Metabolic Panel     Other Visit Diagnoses         Codes      Microalbuminuria     R80.9    Relevant Medications    metoprolol tartrate (Lopressor) 25 mg tablet    furosemide (Lasix) 40 mg tablet    spironolactone (Aldactone) 25 mg tablet    losartan (Cozaar) 25 mg tablet

## 2025-08-05 NOTE — ASSESSMENT & PLAN NOTE
Again strongly encouraged to quit smoking, continue with current inhaler and nebulizer treatments.

## 2025-08-05 NOTE — ASSESSMENT & PLAN NOTE
Addition of SGLT2 caused increase in creatinine and reduction in renal function.  Patient noticing blood sugars are elevating, discontinue SGL 2, start GLP-1 with Mounjaro at 2.5 mg once a week, advised and instructed about use of pen.

## 2025-08-11 ENCOUNTER — APPOINTMENT (OUTPATIENT)
Age: 65
End: 2025-08-11
Payer: MEDICARE

## 2025-08-18 ENCOUNTER — TELEPHONE (OUTPATIENT)
Age: 65
End: 2025-08-18
Payer: MEDICARE

## 2025-08-18 PROCEDURE — RXMED WILLOW AMBULATORY MEDICATION CHARGE

## 2025-08-20 ENCOUNTER — PHARMACY VISIT (OUTPATIENT)
Dept: PHARMACY | Facility: CLINIC | Age: 65
End: 2025-08-20
Payer: COMMERCIAL

## 2025-08-21 ENCOUNTER — OFFICE VISIT (OUTPATIENT)
Age: 65
End: 2025-08-21
Payer: MEDICARE

## 2025-08-21 ENCOUNTER — HOSPITAL ENCOUNTER (OUTPATIENT)
Dept: RADIOLOGY | Facility: CLINIC | Age: 65
Discharge: HOME | End: 2025-08-21
Payer: MEDICARE

## 2025-08-21 VITALS
BODY MASS INDEX: 30.18 KG/M2 | SYSTOLIC BLOOD PRESSURE: 110 MMHG | DIASTOLIC BLOOD PRESSURE: 70 MMHG | WEIGHT: 187.8 LBS | OXYGEN SATURATION: 96 % | HEART RATE: 75 BPM | HEIGHT: 66 IN

## 2025-08-21 DIAGNOSIS — N28.9 RENAL INSUFFICIENCY: ICD-10-CM

## 2025-08-21 DIAGNOSIS — K74.60 CIRRHOSIS OF LIVER WITH ASCITES, UNSPECIFIED HEPATIC CIRRHOSIS TYPE (MULTI): ICD-10-CM

## 2025-08-21 DIAGNOSIS — I47.10 PAROXYSMAL SVT (SUPRAVENTRICULAR TACHYCARDIA): ICD-10-CM

## 2025-08-21 DIAGNOSIS — E11.9 TYPE 2 DIABETES MELLITUS WITHOUT COMPLICATION, UNSPECIFIED WHETHER LONG TERM INSULIN USE: ICD-10-CM

## 2025-08-21 DIAGNOSIS — R18.8 CIRRHOSIS OF LIVER WITH ASCITES, UNSPECIFIED HEPATIC CIRRHOSIS TYPE (MULTI): ICD-10-CM

## 2025-08-21 DIAGNOSIS — R06.09 DYSPNEA ON EXERTION: ICD-10-CM

## 2025-08-21 DIAGNOSIS — J43.2 CENTRILOBULAR EMPHYSEMA (MULTI): ICD-10-CM

## 2025-08-21 DIAGNOSIS — R06.09 DYSPNEA ON EXERTION: Primary | ICD-10-CM

## 2025-08-21 DIAGNOSIS — K76.6 PORTAL HYPERTENSION (MULTI): ICD-10-CM

## 2025-08-21 PROCEDURE — 3008F BODY MASS INDEX DOCD: CPT | Performed by: FAMILY MEDICINE

## 2025-08-21 PROCEDURE — 99214 OFFICE O/P EST MOD 30 MIN: CPT | Performed by: FAMILY MEDICINE

## 2025-08-21 PROCEDURE — 1160F RVW MEDS BY RX/DR IN RCRD: CPT | Performed by: FAMILY MEDICINE

## 2025-08-21 PROCEDURE — 4010F ACE/ARB THERAPY RXD/TAKEN: CPT | Performed by: FAMILY MEDICINE

## 2025-08-21 PROCEDURE — 71046 X-RAY EXAM CHEST 2 VIEWS: CPT

## 2025-08-21 PROCEDURE — 3078F DIAST BP <80 MM HG: CPT | Performed by: FAMILY MEDICINE

## 2025-08-21 PROCEDURE — 1159F MED LIST DOCD IN RCRD: CPT | Performed by: FAMILY MEDICINE

## 2025-08-21 PROCEDURE — 3074F SYST BP LT 130 MM HG: CPT | Performed by: FAMILY MEDICINE

## 2025-08-21 PROCEDURE — 96372 THER/PROPH/DIAG INJ SC/IM: CPT | Performed by: FAMILY MEDICINE

## 2025-08-21 PROCEDURE — G2211 COMPLEX E/M VISIT ADD ON: HCPCS | Performed by: FAMILY MEDICINE

## 2025-08-21 RX ORDER — FUROSEMIDE 40 MG/1
40 TABLET ORAL
Qty: 180 TABLET | Refills: 3 | Status: SHIPPED | OUTPATIENT
Start: 2025-08-21 | End: 2026-08-21

## 2025-08-21 RX ORDER — TRIAMCINOLONE ACETONIDE 40 MG/ML
40 INJECTION, SUSPENSION INTRA-ARTICULAR; INTRAMUSCULAR ONCE
Status: DISCONTINUED | OUTPATIENT
Start: 2025-08-21 | End: 2025-08-21

## 2025-08-21 RX ORDER — IBUPROFEN 200 MG
1 CAPSULE ORAL 2 TIMES DAILY
Qty: 200 EACH | Refills: 3 | Status: SHIPPED | OUTPATIENT
Start: 2025-08-21 | End: 2026-08-21

## 2025-08-21 RX ORDER — TRIAMCINOLONE ACETONIDE 40 MG/ML
40 INJECTION, SUSPENSION INTRA-ARTICULAR; INTRAMUSCULAR ONCE
Status: COMPLETED | OUTPATIENT
Start: 2025-08-21 | End: 2025-08-21

## 2025-08-21 RX ADMIN — TRIAMCINOLONE ACETONIDE 40 MG: 40 INJECTION, SUSPENSION INTRA-ARTICULAR; INTRAMUSCULAR at 09:15

## 2025-08-21 ASSESSMENT — ENCOUNTER SYMPTOMS
CHEST TIGHTNESS: 1
NAUSEA: 0
FEVER: 0
WHEEZING: 1
ACTIVITY CHANGE: 0
APPETITE CHANGE: 0
CHILLS: 0
CONSTIPATION: 0
COUGH: 1
PALPITATIONS: 0
DIARRHEA: 0
SHORTNESS OF BREATH: 1
VOMITING: 0
SINUS PRESSURE: 0
RHINORRHEA: 0
SINUS PAIN: 0
ABDOMINAL PAIN: 0
FATIGUE: 1

## 2025-08-22 LAB
ALBUMIN SERPL-MCNC: 4.3 G/DL (ref 3.6–5.1)
ALP SERPL-CCNC: 102 U/L (ref 37–153)
ALT SERPL-CCNC: 11 U/L (ref 6–29)
ANION GAP SERPL CALCULATED.4IONS-SCNC: 9 MMOL/L (CALC) (ref 7–17)
AST SERPL-CCNC: 13 U/L (ref 10–35)
BASOPHILS # BLD AUTO: 74 CELLS/UL (ref 0–200)
BASOPHILS NFR BLD AUTO: 0.8 %
BILIRUB SERPL-MCNC: 0.4 MG/DL (ref 0.2–1.2)
BNP SERPL-MCNC: 53 PG/ML
BUN SERPL-MCNC: 22 MG/DL (ref 7–25)
CALCIUM SERPL-MCNC: 11 MG/DL (ref 8.6–10.4)
CHLORIDE SERPL-SCNC: 101 MMOL/L (ref 98–110)
CO2 SERPL-SCNC: 28 MMOL/L (ref 20–32)
CREAT SERPL-MCNC: 1.41 MG/DL (ref 0.5–1.05)
EGFRCR SERPLBLD CKD-EPI 2021: 41 ML/MIN/1.73M2
EOSINOPHIL # BLD AUTO: 212 CELLS/UL (ref 15–500)
EOSINOPHIL NFR BLD AUTO: 2.3 %
ERYTHROCYTE [DISTWIDTH] IN BLOOD BY AUTOMATED COUNT: 13.4 % (ref 11–15)
GLUCOSE SERPL-MCNC: 119 MG/DL (ref 65–99)
HCT VFR BLD AUTO: 43.3 % (ref 35–45)
HGB BLD-MCNC: 14 G/DL (ref 11.7–15.5)
LYMPHOCYTES # BLD AUTO: 2723 CELLS/UL (ref 850–3900)
LYMPHOCYTES NFR BLD AUTO: 29.6 %
MCH RBC QN AUTO: 27.6 PG (ref 27–33)
MCHC RBC AUTO-ENTMCNC: 32.3 G/DL (ref 32–36)
MCV RBC AUTO: 85.4 FL (ref 80–100)
MONOCYTES # BLD AUTO: 635 CELLS/UL (ref 200–950)
MONOCYTES NFR BLD AUTO: 6.9 %
NEUTROPHILS # BLD AUTO: 5557 CELLS/UL (ref 1500–7800)
NEUTROPHILS NFR BLD AUTO: 60.4 %
PLATELET # BLD AUTO: 207 THOUSAND/UL (ref 140–400)
PMV BLD REES-ECKER: 9.4 FL (ref 7.5–12.5)
POTASSIUM SERPL-SCNC: 4.8 MMOL/L (ref 3.5–5.3)
PROT SERPL-MCNC: 7.1 G/DL (ref 6.1–8.1)
RBC # BLD AUTO: 5.07 MILLION/UL (ref 3.8–5.1)
SODIUM SERPL-SCNC: 138 MMOL/L (ref 135–146)
WBC # BLD AUTO: 9.2 THOUSAND/UL (ref 3.8–10.8)

## 2025-08-25 ENCOUNTER — RESULTS FOLLOW-UP (OUTPATIENT)
Age: 65
End: 2025-08-25
Payer: MEDICARE

## 2025-08-27 ENCOUNTER — HOSPITAL ENCOUNTER (OUTPATIENT)
Dept: RESPIRATORY THERAPY | Facility: HOSPITAL | Age: 65
Discharge: HOME | End: 2025-08-27
Payer: MEDICARE

## 2025-08-27 DIAGNOSIS — R06.09 DYSPNEA ON EXERTION: ICD-10-CM

## 2025-08-27 DIAGNOSIS — J43.2 CENTRILOBULAR EMPHYSEMA (MULTI): ICD-10-CM

## 2025-08-27 LAB
MGC ASCENT PFT - FEV1 - POST: 1.86
MGC ASCENT PFT - FEV1 - PRE: 1.69
MGC ASCENT PFT - FEV1 - PREDICTED: 2.39
MGC ASCENT PFT - FVC - POST: 3.05
MGC ASCENT PFT - FVC - PRE: 2.99
MGC ASCENT PFT - FVC - PREDICTED: 3.07

## 2025-08-27 PROCEDURE — 2500000001 HC RX 250 WO HCPCS SELF ADMINISTERED DRUGS (ALT 637 FOR MEDICARE OP): Performed by: FAMILY MEDICINE

## 2025-08-27 RX ORDER — ALBUTEROL SULFATE 90 UG/1
4 INHALANT RESPIRATORY (INHALATION) ONCE
Status: COMPLETED | OUTPATIENT
Start: 2025-08-27 | End: 2025-08-27

## 2025-08-27 RX ORDER — ALBUTEROL SULFATE 0.83 MG/ML
3 SOLUTION RESPIRATORY (INHALATION) ONCE
Status: COMPLETED | OUTPATIENT
Start: 2025-08-27 | End: 2025-08-27

## 2025-08-27 RX ADMIN — ALBUTEROL SULFATE 4 PUFF: 90 AEROSOL, METERED RESPIRATORY (INHALATION) at 12:39

## 2025-09-12 ENCOUNTER — APPOINTMENT (OUTPATIENT)
Age: 65
End: 2025-09-12
Payer: MEDICARE

## 2025-09-15 ENCOUNTER — APPOINTMENT (OUTPATIENT)
Age: 65
End: 2025-09-15
Payer: MEDICARE